# Patient Record
Sex: FEMALE | Race: OTHER | NOT HISPANIC OR LATINO | Employment: UNEMPLOYED | URBAN - METROPOLITAN AREA
[De-identification: names, ages, dates, MRNs, and addresses within clinical notes are randomized per-mention and may not be internally consistent; named-entity substitution may affect disease eponyms.]

---

## 2017-07-24 ENCOUNTER — TELEPHONE (OUTPATIENT)
Dept: FAMILY MEDICINE | Facility: CLINIC | Age: 42
End: 2017-07-24

## 2017-07-24 NOTE — TELEPHONE ENCOUNTER
----- Message from Earle Bhartijenifer sent at 7/21/2017  8:51 AM CDT -----  Contact: Patient  Patient states that she would like a follow up ER Appointment and to please call.  She was told by the ER that she would need a referral for a Neurologist.  Please call her back [phone].  Thank you

## 2017-08-03 ENCOUNTER — OFFICE VISIT (OUTPATIENT)
Dept: FAMILY MEDICINE | Facility: CLINIC | Age: 42
End: 2017-08-03
Payer: COMMERCIAL

## 2017-08-03 ENCOUNTER — LAB VISIT (OUTPATIENT)
Dept: LAB | Facility: HOSPITAL | Age: 42
End: 2017-08-03
Attending: NURSE PRACTITIONER
Payer: COMMERCIAL

## 2017-08-03 ENCOUNTER — TELEPHONE (OUTPATIENT)
Dept: FAMILY MEDICINE | Facility: CLINIC | Age: 42
End: 2017-08-03

## 2017-08-03 VITALS
WEIGHT: 128.31 LBS | SYSTOLIC BLOOD PRESSURE: 106 MMHG | RESPIRATION RATE: 12 BRPM | HEART RATE: 64 BPM | BODY MASS INDEX: 25.87 KG/M2 | DIASTOLIC BLOOD PRESSURE: 56 MMHG | TEMPERATURE: 99 F | HEIGHT: 59 IN

## 2017-08-03 DIAGNOSIS — M47.22 CERVICAL SPONDYLOSIS WITH RADICULOPATHY: Primary | ICD-10-CM

## 2017-08-03 DIAGNOSIS — R29.898 WEAKNESS OF LEFT UPPER EXTREMITY: ICD-10-CM

## 2017-08-03 DIAGNOSIS — F32.A DEPRESSION, UNSPECIFIED DEPRESSION TYPE: ICD-10-CM

## 2017-08-03 DIAGNOSIS — G47.00 INSOMNIA, UNSPECIFIED TYPE: ICD-10-CM

## 2017-08-03 DIAGNOSIS — R42 DIZZINESS: ICD-10-CM

## 2017-08-03 DIAGNOSIS — Z13.220 LIPID SCREENING: ICD-10-CM

## 2017-08-03 LAB
ALBUMIN SERPL BCP-MCNC: 3.9 G/DL
ALP SERPL-CCNC: 53 U/L
ALT SERPL W/O P-5'-P-CCNC: 9 U/L
ANION GAP SERPL CALC-SCNC: 9 MMOL/L
AST SERPL-CCNC: 14 U/L
BASOPHILS # BLD AUTO: 0.02 K/UL
BASOPHILS NFR BLD: 0.4 %
BILIRUB SERPL-MCNC: 0.4 MG/DL
BUN SERPL-MCNC: 11 MG/DL
CALCIUM SERPL-MCNC: 9.5 MG/DL
CHLORIDE SERPL-SCNC: 111 MMOL/L
CHOLEST/HDLC SERPL: 4.5 {RATIO}
CO2 SERPL-SCNC: 24 MMOL/L
CREAT SERPL-MCNC: 0.9 MG/DL
DIFFERENTIAL METHOD: NORMAL
EOSINOPHIL # BLD AUTO: 0.1 K/UL
EOSINOPHIL NFR BLD: 1.3 %
ERYTHROCYTE [DISTWIDTH] IN BLOOD BY AUTOMATED COUNT: 14.4 %
EST. GFR  (AFRICAN AMERICAN): >60 ML/MIN/1.73 M^2
EST. GFR  (NON AFRICAN AMERICAN): >60 ML/MIN/1.73 M^2
GLUCOSE SERPL-MCNC: 89 MG/DL
HCT VFR BLD AUTO: 37.2 %
HDL/CHOLESTEROL RATIO: 22.2 %
HDLC SERPL-MCNC: 194 MG/DL
HDLC SERPL-MCNC: 43 MG/DL
HGB BLD-MCNC: 12.8 G/DL
LDLC SERPL CALC-MCNC: 124.2 MG/DL
LYMPHOCYTES # BLD AUTO: 1.9 K/UL
LYMPHOCYTES NFR BLD: 41.9 %
MCH RBC QN AUTO: 29.1 PG
MCHC RBC AUTO-ENTMCNC: 34.4 G/DL
MCV RBC AUTO: 85 FL
MONOCYTES # BLD AUTO: 0.3 K/UL
MONOCYTES NFR BLD: 5.9 %
NEUTROPHILS # BLD AUTO: 2.3 K/UL
NEUTROPHILS NFR BLD: 50.5 %
NONHDLC SERPL-MCNC: 151 MG/DL
PLATELET # BLD AUTO: 247 K/UL
PMV BLD AUTO: 10.3 FL
POTASSIUM SERPL-SCNC: 4.4 MMOL/L
PROT SERPL-MCNC: 7.1 G/DL
PTH-INTACT SERPL-MCNC: 80 PG/ML
RBC # BLD AUTO: 4.4 M/UL
SODIUM SERPL-SCNC: 144 MMOL/L
TRIGL SERPL-MCNC: 134 MG/DL
TSH SERPL DL<=0.005 MIU/L-ACNC: 0.9 UIU/ML
WBC # BLD AUTO: 4.56 K/UL

## 2017-08-03 PROCEDURE — 83970 ASSAY OF PARATHORMONE: CPT

## 2017-08-03 PROCEDURE — 80053 COMPREHEN METABOLIC PANEL: CPT

## 2017-08-03 PROCEDURE — 85025 COMPLETE CBC W/AUTO DIFF WBC: CPT

## 2017-08-03 PROCEDURE — 84443 ASSAY THYROID STIM HORMONE: CPT

## 2017-08-03 PROCEDURE — 99204 OFFICE O/P NEW MOD 45 MIN: CPT | Mod: S$GLB,,, | Performed by: NURSE PRACTITIONER

## 2017-08-03 PROCEDURE — 80061 LIPID PANEL: CPT

## 2017-08-03 PROCEDURE — 36415 COLL VENOUS BLD VENIPUNCTURE: CPT | Mod: PO

## 2017-08-03 PROCEDURE — 3008F BODY MASS INDEX DOCD: CPT | Mod: S$GLB,,, | Performed by: NURSE PRACTITIONER

## 2017-08-03 RX ORDER — MIRTAZAPINE 15 MG/1
15 TABLET, ORALLY DISINTEGRATING ORAL NIGHTLY
Qty: 30 TABLET | Refills: 3 | Status: SHIPPED | OUTPATIENT
Start: 2017-08-03 | End: 2018-08-03

## 2017-08-03 RX ORDER — TRAMADOL HYDROCHLORIDE 50 MG/1
50 TABLET ORAL EVERY 6 HOURS PRN
Qty: 20 TABLET | Refills: 0 | Status: SHIPPED | OUTPATIENT
Start: 2017-08-03 | End: 2017-08-13

## 2017-08-03 RX ORDER — DULOXETIN HYDROCHLORIDE 30 MG/1
30 CAPSULE, DELAYED RELEASE ORAL DAILY
Qty: 30 CAPSULE | Refills: 2 | Status: SHIPPED | OUTPATIENT
Start: 2017-08-03 | End: 2023-11-27

## 2017-08-03 NOTE — PROGRESS NOTES
Subjective:       Patient ID: Zhang Kurtz is a 42 y.o. female.    Chief Complaint: Dizziness (aka DIZZY) over 2 weeks    HPI Has been dizzy for the past two weeks off and on. States had this in the past and was due to her neck. She went to the ER a few weeks ago and was told her Xray showed degenerative changes and that she needed MRI and to follow up with Neuro.  They gave IV fluids. Dizziness comes and goes. No sinus congestion or fluid in ears. Stiffness to neck. Weakness to left upper extremity. Hands have numbness, tingling. She was advised by neurosurgery in the past that she needed surgery but she was scared to do it. She did steroid injections and physical therapy which helped some. She is crying in the office today. States this has her depression acting up. She has been on several different anti-depressants in the past. States the cymbalta worked well and she wants to go back on that. She denies any other concerns. See ROS.    The following portion of the patients history was reviewed and updated as appropriate: allergies, current medications, past medical and surgical history. Past social history and problem list reviewed. Family PMH and Past social history reviewed. Tobacco, Illicit drug use reviewed.     Review of Systems   Constitutional: Positive for activity change (due to dizziness). Negative for chills, fatigue and fever.   HENT: Negative for congestion, postnasal drip, rhinorrhea, sinus pressure and sneezing.    Eyes: Negative for visual disturbance.   Respiratory: Negative for cough, chest tightness, shortness of breath and wheezing.    Cardiovascular: Negative for chest pain and palpitations.   Gastrointestinal: Negative for abdominal pain, constipation, diarrhea, nausea and vomiting.   Musculoskeletal: Positive for neck pain.   Neurological: Positive for dizziness, weakness (left upper extremity), numbness (numbness and tingling to left hand, arm) and headaches.   Psychiatric/Behavioral:  "Positive for decreased concentration, dysphoric mood and sleep disturbance. Negative for suicidal ideas.       Objective:     BP (!) 106/56 Comment: Manual, left arm, sitting  Pulse 64   Temp 98.8 °F (37.1 °C) (Oral)   Resp 12   Ht 4' 11" (1.499 m)   Wt 58.2 kg (128 lb 4.9 oz)   LMP 07/01/2017   BMI 25.91 kg/m²      Physical Exam     Constitutional: oriented to person, place, and time. well-developed and well-nourished.   HENT:  Throat clear. Canals blocked with cerumen. Nares patent.  Head: Normocephalic.   Eyes: Conjunctivae are normal. Pupils are equal, round, and reactive to light.   Neck: Normal range of motion. Neck supple. No tracheal deviation present. No thyromegaly present. discomfort with flexion and extension of the neck.   Cardiovascular: Normal rate, regular rhythm and normal heart sounds.    Pulmonary/Chest: Effort normal and breath sounds normal. No respiratory distress. No wheezes.   Abdominal: Soft. Bowel sounds are normal. No distension. There is no tenderness.   Musculoskeletal: Normal range of motion. gait and coordination normal. Weakness to  left hand.   Neurological: oriented to person, place, and time.   Skin: Skin is warm and dry. No rashes or lesions  Psychiatric: depressed mood. Crying in office. She does not present as a threat to herself or others.  Assessment:       1. Cervical spondylosis with radiculopathy    2. Weakness of left upper extremity    3. Dizziness    4. Depression, unspecified depression type    5. Insomnia, unspecified type    6. Lipid screening        Plan:         Zhang was seen today for dizziness (aka dizzy) over 2 weeks.    Diagnoses and all orders for this visit:    Cervical spondylosis with radiculopathy: will get records from [facility] ER visit. She has seen neurosurgery In the past. Will refer to them for follow up. She will need updated MRI.  -     Ambulatory referral to Neurosurgery  -     MRI Cervical Spine W WO Cont; Future    Weakness of left upper " extremity: due to cervical disks. Will need MRI.  -     MRI Cervical Spine W WO Cont; Future    Dizziness: no indication this is from Upper respiratory symptoms. Will check MRI. Labs, Give meclizine for symptoms.  -     MRI Cervical Spine W WO Cont; Future  -     CBC auto differential; Future  -     Comprehensive metabolic panel; Future  -     TSH; Future  -     PTH, intact; Future    Depression, unspecified depression type: restart on Cymbalta. Take as directed.    Insomnia, unspecified type: she states Dr. Forman had her on Remeron in the past that worked very well for her. She would like to try this medication again.     Lipid screening: due for labs.  -     Lipid panel; Future    Other orders  -     duloxetine (CYMBALTA) 30 MG capsule; Take 1 capsule (30 mg total) by mouth once daily.  -     mirtazapine (REMERON SOL-TAB) 15 MG disintegrating tablet; Take 1 tablet (15 mg total) by mouth every evening.  -     tramadol (ULTRAM) 50 mg tablet; Take 1 tablet (50 mg total) by mouth every 6 (six) hours as needed for Pain.      Take medications only as prescribed  Healthy diet, exercise  Adequate rest  Adequate hydration  Avoid allergens  Avoid excessive caffeine

## 2017-08-03 NOTE — TELEPHONE ENCOUNTER
----- Message from Nikky Cardona sent at 8/3/2017 11:25 AM CDT -----  Contact: [phone]  Patient is requesting a call back from the nurse stated she allergic to codeine and medication called in have codeine in it.     Please call the patient upon request at phone number [phone].

## 2017-08-03 NOTE — TELEPHONE ENCOUNTER
Pt states that the tramadol made her ill. She is requesting something else be sent to pharmacy. Pt advised she would be contacted if there were any issues. Pt also advised that tramadol does not have codeine in it. Pt verbalized understanding.

## 2017-08-03 NOTE — TELEPHONE ENCOUNTER
That is the strongest medication I can give her as an NP. If she feels she needs stronger narcotics to control her pain she will have to see MD or discuss with the Neurosurgeon when she see's them.

## 2017-08-08 ENCOUNTER — TELEPHONE (OUTPATIENT)
Dept: FAMILY MEDICINE | Facility: CLINIC | Age: 42
End: 2017-08-08

## 2017-08-08 ENCOUNTER — PATIENT MESSAGE (OUTPATIENT)
Dept: FAMILY MEDICINE | Facility: CLINIC | Age: 42
End: 2017-08-08

## 2017-08-08 DIAGNOSIS — R79.89 ELEVATED PTHRP LEVEL: Primary | ICD-10-CM

## 2017-08-08 RX ORDER — ONDANSETRON 8 MG/1
8 TABLET, ORALLY DISINTEGRATING ORAL EVERY 6 HOURS PRN
Qty: 20 TABLET | Refills: 1 | Status: SHIPPED | OUTPATIENT
Start: 2017-08-08 | End: 2023-11-27

## 2017-08-08 NOTE — TELEPHONE ENCOUNTER
Received ER hosp records from [facility], DOS 07/20/2017.    hosp f/u was 8/3/17, RAVEN Monzon.      Put into [place]ff,NP in-box.

## 2017-08-08 NOTE — TELEPHONE ENCOUNTER
Her parathyroid hormone is slightly elevated. She needs to have her Vitamin D level checked. Her calcium is normal.  Remainder of labs are all in appropriate range. I put in orders for Vitamin D to be done. Thanks.

## 2017-08-08 NOTE — TELEPHONE ENCOUNTER
Pt is inquiring about her [facility] paperwork. Pt is now requesting a medication for nausea. Is there is anything you can send in for this? Also pt is asking for you to extend her work excuse for one more day as she is still feeling bad. Please advise and I will contact pt with orders. NATI Aguilar LPN

## 2017-08-08 NOTE — TELEPHONE ENCOUNTER
----- Message from Padmaja Archer sent at 8/7/2017  2:16 PM CDT -----  Contact: pt   Wants to know if paperwork has been received?  Call back

## 2017-08-08 NOTE — TELEPHONE ENCOUNTER
Nausea medication sent to pharmacy. Paperwork completed, can fax.  She was supposed to have appointment scheduled with neurosurgery. I do not see that it was done. Please schedule that.

## 2017-08-08 NOTE — TELEPHONE ENCOUNTER
----- Message from Padmaja Archer sent at 8/7/2017  2:17 PM CDT -----  Contact: pt   Wants something called in for pain and nausea  Call back   .  [facility] [address] [place] [address] & [address] [address]  Phone: [phone] Fax: [phone]

## 2017-08-09 ENCOUNTER — OFFICE VISIT (OUTPATIENT)
Dept: OBSTETRICS AND GYNECOLOGY | Facility: CLINIC | Age: 42
End: 2017-08-09
Payer: COMMERCIAL

## 2017-08-09 ENCOUNTER — HOSPITAL ENCOUNTER (OUTPATIENT)
Dept: RADIOLOGY | Facility: HOSPITAL | Age: 42
Discharge: HOME | End: 2017-08-09
Attending: OBSTETRICS & GYNECOLOGY
Payer: COMMERCIAL

## 2017-08-09 VITALS
BODY MASS INDEX: 25.4 KG/M2 | WEIGHT: 126 LBS | HEIGHT: 59 IN | WEIGHT: 126.75 LBS | BODY MASS INDEX: 25.55 KG/M2 | DIASTOLIC BLOOD PRESSURE: 70 MMHG | SYSTOLIC BLOOD PRESSURE: 102 MMHG | HEIGHT: 59 IN

## 2017-08-09 DIAGNOSIS — Z01.419 ENCOUNTER FOR GYNECOLOGICAL EXAMINATION WITHOUT ABNORMAL FINDING: Primary | ICD-10-CM

## 2017-08-09 DIAGNOSIS — Z12.31 VISIT FOR SCREENING MAMMOGRAM: ICD-10-CM

## 2017-08-09 DIAGNOSIS — R10.2 PELVIC PAIN IN FEMALE: ICD-10-CM

## 2017-08-09 PROCEDURE — 77067 SCR MAMMO BI INCL CAD: CPT | Mod: 26,,, | Performed by: RADIOLOGY

## 2017-08-09 PROCEDURE — 88175 CYTOPATH C/V AUTO FLUID REDO: CPT

## 2017-08-09 PROCEDURE — 99386 PREV VISIT NEW AGE 40-64: CPT | Mod: S$GLB,,, | Performed by: OBSTETRICS & GYNECOLOGY

## 2017-08-09 PROCEDURE — 77067 SCR MAMMO BI INCL CAD: CPT | Mod: TC

## 2017-08-09 PROCEDURE — 99999 PR PBB SHADOW E&M-EST. PATIENT-LVL III: CPT | Mod: PBBFAC,,, | Performed by: OBSTETRICS & GYNECOLOGY

## 2017-08-09 PROCEDURE — 87624 HPV HI-RISK TYP POOLED RSLT: CPT

## 2017-08-09 NOTE — PROGRESS NOTES
Chief Complaint   Patient presents with    Well Woman       History and Physical:  Patient's last menstrual period was 08/01/2017 (exact date).       Zhang Kurtz is a 42 y.o. -American Female who presents today for her routine annual GYN exam. The patient has no Gynecology complaints today. No bowel or bladder complaints. Reports pelvic pain on and off over last 2-3 years, no oral contraceptive pills or Birth control - counseled.       Allergies:   Review of patient's allergies indicates:   Allergen Reactions    Codeine Nausea Only       Past Medical History:   Diagnosis Date    Abnormal Pap smear     High grade squamous intraepithelial lesion.    Anxiety     Breast disorder     lumps removed in the past,  bilateral    Depression     Dysplasia of cervix     s/p CKC    Fatty liver     noted on 1/12 USG    Generalized headaches     Hyperlipidemia     Increased prolactin level     Pituitary microadenoma with hyperprolactinemia        Past Surgical History:   Procedure Laterality Date    CERVICAL CONIZATION   W/ LASER         MEDS:   Current Outpatient Prescriptions on File Prior to Visit   Medication Sig Dispense Refill    duloxetine (CYMBALTA) 30 MG capsule Take 1 capsule (30 mg total) by mouth once daily. 30 capsule 2    mirtazapine (REMERON SOL-TAB) 15 MG disintegrating tablet Take 1 tablet (15 mg total) by mouth every evening. 30 tablet 3    ondansetron (ZOFRAN-ODT) 8 MG TbDL Take 1 tablet (8 mg total) by mouth every 6 (six) hours as needed. 20 tablet 1    alprazolam (XANAX) 2 MG Tab Take 1 tablet (2 mg total) by mouth 3 (three) times daily as needed. 90 tablet 2    buPROPion (WELLBUTRIN SR) 150 MG TBSR 12 hr tablet Take 1 tablet (150 mg total) by mouth 2 (two) times daily. 60 tablet 3    tramadol (ULTRAM) 50 mg tablet Take 1 tablet (50 mg total) by mouth every 6 (six) hours as needed for Pain. 20 tablet 0    zolpidem (AMBIEN) 10 mg Tab Take 1 tablet (10 mg total) by mouth nightly as  needed. 30 tablet 2    [DISCONTINUED] baclofen (LIORESAL) 20 MG tablet       [DISCONTINUED] gabapentin (NEURONTIN) 800 MG tablet        No current facility-administered medications on file prior to visit.        OB History      Para Term  AB Living    3 2     1      SAB TAB Ectopic Multiple Live Births    1                  Social History     Social History    Marital status: Single     Spouse name: N/A    Number of children: 2    Years of education: N/A     Occupational History    administrative coordinatory      Social History Main Topics    Smoking status: Former Smoker     Packs/day: 1.00     Years: 10.00     Types: Cigars    Smokeless tobacco: Never Used      Comment: cigars    Alcohol use 0.6 oz/week     1 Standard drinks or equivalent per week    Drug use: No    Sexual activity: Yes     Partners: Male     Birth control/ protection: None     Other Topics Concern    Not on file     Social History Narrative    No narrative on file       Family History   Problem Relation Age of Onset    Cancer Mother      lymph node CA    Asthma Daughter     Anesthesia problems Neg Hx     Clotting disorder Neg Hx          Past medical and surgical history reviewed.   I have reviewed the patient's medical history in detail and updated the computerized patient record.        Review of System:   General: no chills, fever, night sweats, weight gain or weight loss  Psychological: no depression or suicidal ideation  Breasts: no new or changing breast lumps, nipple discharge or masses.  Respiratory: no cough, shortness of breath, or wheezing  Cardiovascular: no chest pain or dyspnea on exertion  Gastrointestinal: no abdominal pain, change in bowel habits, or black or bloody stools  Genito-Urinary: no incontinence, urinary frequency/urgency or vulvar/vaginal symptoms, pelvic pain or abnormal vaginal bleeding.  Musculoskeletal: no gait disturbance or muscular weakness      Physical Exam:   /70   Ht 4'  "10.5" (1.486 m)   Wt 57.5 kg (126 lb 12.2 oz)   LMP 08/01/2017 (Exact Date)   BMI 26.04 kg/m²   Constitutional: She is oriented to person, place, and time. She appears well-developed and well-nourished. No distress.   HENT:   Head: Normocephalic and atraumatic.   Eyes: Conjunctivae and EOM are normal. No scleral icterus.   Neck: Normal range of motion. Neck supple. No tracheal deviation present.   Cardiovascular: Normal rate.    Pulmonary/Chest: Effort normal. No respiratory distress. She exhibits no tenderness.  Breasts: are symmetrical.   Right breast exhibits no inverted nipple, no mass, no nipple discharge, no skin change and no tenderness.   Left breast exhibits no inverted nipple, no mass, no nipple discharge, no skin change and no tenderness.  Abdominal: Soft. She exhibits no distension and no mass. There is no tenderness. There is no rebound and no guarding.   Genitourinary:    External rectal exam shows no thrombosed external hemorrhoids.    Pelvic exam was performed with patient supine.   No labial fusion.   There is no rash, lesion or injury on the right labia.   There is no rash, lesion or injury on the left labia.   No bleeding and no signs of injury around the vaginal introitus, urethra is without lesions and well supported. The cervix is visualized with no discharge, lesions or friability.   No vaginal discharge found.    No significant Cystocele, Enterocele or rectocele, and uterus well supported.   Bimanual exam:   The urethra is normal to palpation and there are no palpable vaginal wall masses.   Uterus is not deviated, not enlarged, not fixed, normal shape and not tender.   Cervix exhibits no motion tenderness.    Right adnexum displays no mass and no tenderness.   Left adnexum displays no mass and no tenderness.  Musculoskeletal: Normal range of motion.   Lymphadenopathy: No inguinal adenopathy present.   Neurological: She is alert and oriented to person, place, and time. Coordination normal. "   Skin: Skin is warm and dry. She is not diaphoretic.   Psychiatric: She has a normal mood and affect.      Assessment:   Normal annual GYN exam  1. Encounter for gynecological examination without abnormal finding  Liquid-based pap smear, screening    HPV High Risk Genotypes, PCR   2. Visit for screening mammogram  Mammo Digital Screening Bilat With CAD       Plan:   PAP  Mammogram  Follow up in 1 year.

## 2017-08-10 ENCOUNTER — TELEPHONE (OUTPATIENT)
Dept: FAMILY MEDICINE | Facility: CLINIC | Age: 42
End: 2017-08-10

## 2017-08-10 LAB
C TRACH DNA SPEC QL NAA+PROBE: NOT DETECTED
N GONORRHOEA DNA SPEC QL NAA+PROBE: NOT DETECTED

## 2017-08-10 RX ORDER — ERGOCALCIFEROL 1.25 MG/1
50000 CAPSULE ORAL
Qty: 12 CAPSULE | Refills: 3 | Status: SHIPPED | OUTPATIENT
Start: 2017-08-10 | End: 2023-11-27 | Stop reason: SDUPTHER

## 2017-08-10 NOTE — TELEPHONE ENCOUNTER
Attempted to call pt, left message on voicemail to return call to clinic.  There are multiple messages regarding this patient.    Please ensure she is also notified that her vitamin D is low and Sheron recommends a supplement that was sent to the pharmacy. Remainder of labs are pending.

## 2017-08-10 NOTE — TELEPHONE ENCOUNTER
----- Message from Yaritza Tinsley sent at 8/10/2017 11:57 AM CDT -----  Contact: patient  Patient returning a missed call. Please advise. Call to pod. No answer.  Call back , she is at work in a call center/ leave message  Thanks!

## 2017-08-10 NOTE — TELEPHONE ENCOUNTER
----- Message from Edin Britt sent at 8/9/2017  1:40 PM CDT -----  Contact: self   [phone]  Patient states she is returning the nurse phone call. Thanks!

## 2017-08-10 NOTE — TELEPHONE ENCOUNTER
Her vitamin D is low. She needs to be on supplements for this. I will send to pharmacy. Take one weekly. Remainder of labs are pending.

## 2017-08-11 NOTE — TELEPHONE ENCOUNTER
Please let her know that I got this message concerning her MRI that we ordered. Her insurance is not going to pay for her to have it done at this time.    Dr. Sheron Monzon,     Thank you for ordering   [id number] - MRI CERVICAL SPINE W WO CONTRAST for patient Zhang Kurtz, MRN [id number]. Unfortunately, the patient's insurance, [employer], has denied the service due to Other, N/A. This is an automated In Basket notification that does not require a reply. For a more detailed explanation, or for questions regarding this insurance denial, send an In Basket message to the Pre Service Intake pool or call the [facility] Pre-Service department at [phone] and reference referral ID [id number].The Pre-Service department hours are M-F 8 a.m. to 5 p.m.       Thank you,

## 2017-08-11 NOTE — TELEPHONE ENCOUNTER
Attempted to call pt, left message on voicemail to return call to clinic.    Need to also notify that insurance denied MRI at this time.

## 2017-08-13 ENCOUNTER — PATIENT MESSAGE (OUTPATIENT)
Dept: FAMILY MEDICINE | Facility: CLINIC | Age: 42
End: 2017-08-13

## 2017-08-15 ENCOUNTER — TELEPHONE (OUTPATIENT)
Dept: FAMILY MEDICINE | Facility: CLINIC | Age: 42
End: 2017-08-15

## 2017-08-15 LAB
HPV HR 12 DNA CVX QL NAA+PROBE: NEGATIVE
HPV16 DNA SPEC QL NAA+PROBE: NEGATIVE
HPV18 DNA SPEC QL NAA+PROBE: NEGATIVE

## 2017-08-15 NOTE — TELEPHONE ENCOUNTER
----- Message from Sheron Monzon NP sent at 8/14/2017  1:11 PM CDT -----  Her MRI was denied. Please cancel that order so she does not go and have it done and then not be covered by insurance. Please try again to let her know about this.

## 2017-08-18 ENCOUNTER — TELEPHONE (OUTPATIENT)
Dept: FAMILY MEDICINE | Facility: CLINIC | Age: 42
End: 2017-08-18

## 2017-08-18 NOTE — TELEPHONE ENCOUNTER
"Request for MRI/CERVICAL has been Denied   00161 1 MRI Cervical Spine, (spinal canal and contents); without and with contrast Denied Based on [facility], we are unable to approve the requested procedure. Spinal imaging is not generally necessary during the first six weeks of symptoms except when a "red flag" finding is noted. MRI might be supported in the evaluation of suspected or known spinal disease with one of the followin) failure to improve after a recent (within 3 months) 6 week trial of physician-guided clinical care (treatment or observation) with clinical re-evaluation, or 2) any signs or symptoms such as significant motor weakness, recent malignancy or infection, cauda equina syndrome, for which conservative treatment is not needed. The clinical information received fails to support meeting these requirements and, therefore, the requested procedure is not indicated at this time.   Sheron Monzon can engage in a physician peer to peer review by calling   [place] @ [phone] opt 4 and give case #[id number]   Please call Jessica @ ext [phone] or IM me once the peer to peer has been completed.   Thanks       "

## 2017-08-29 ENCOUNTER — TELEPHONE (OUTPATIENT)
Dept: FAMILY MEDICINE | Facility: CLINIC | Age: 42
End: 2017-08-29

## 2018-08-10 ENCOUNTER — DOCUMENTATION ONLY (OUTPATIENT)
Dept: OBSTETRICS AND GYNECOLOGY | Facility: CLINIC | Age: 43
End: 2018-08-10

## 2018-08-13 ENCOUNTER — PATIENT MESSAGE (OUTPATIENT)
Dept: OBSTETRICS AND GYNECOLOGY | Facility: CLINIC | Age: 43
End: 2018-08-13

## 2018-11-14 ENCOUNTER — OFFICE VISIT (OUTPATIENT)
Dept: FAMILY MEDICINE | Facility: CLINIC | Age: 43
End: 2018-11-14
Payer: COMMERCIAL

## 2018-11-14 VITALS
DIASTOLIC BLOOD PRESSURE: 70 MMHG | WEIGHT: 124.56 LBS | HEIGHT: 59 IN | RESPIRATION RATE: 18 BRPM | SYSTOLIC BLOOD PRESSURE: 106 MMHG | BODY MASS INDEX: 25.11 KG/M2 | HEART RATE: 76 BPM | TEMPERATURE: 98 F

## 2018-11-14 DIAGNOSIS — G47.00 INSOMNIA, UNSPECIFIED TYPE: ICD-10-CM

## 2018-11-14 DIAGNOSIS — Z12.39 BREAST CANCER SCREENING: ICD-10-CM
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
## 2018-11-14 DIAGNOSIS — M50.30 DDD (DEGENERATIVE DISC DISEASE), CERVICAL: Primary | ICD-10-CM

## 2018-11-14 DIAGNOSIS — M25.569 KNEE PAIN, UNSPECIFIED CHRONICITY, UNSPECIFIED LATERALITY: ICD-10-CM

## 2018-11-14 DIAGNOSIS — F41.1 GENERALIZED ANXIETY DISORDER: ICD-10-CM

## 2018-11-14 PROCEDURE — 96372 THER/PROPH/DIAG INJ SC/IM: CPT | Mod: 59,S$GLB,, | Performed by: NURSE PRACTITIONER

## 2018-11-14 PROCEDURE — 90686 IIV4 VACC NO PRSV 0.5 ML IM: CPT | Mod: S$GLB,,, | Performed by: NURSE PRACTITIONER

## 2018-11-14 PROCEDURE — 99214 OFFICE O/P EST MOD 30 MIN: CPT | Mod: 25,S$GLB,, | Performed by: NURSE PRACTITIONER

## 2018-11-14 PROCEDURE — 90471 IMMUNIZATION ADMIN: CPT | Mod: S$GLB,,, | Performed by: NURSE PRACTITIONER

## 2018-11-14 PROCEDURE — 3008F BODY MASS INDEX DOCD: CPT | Mod: CPTII,S$GLB,, | Performed by: NURSE PRACTITIONER

## 2018-11-14 RX ORDER — ZOLPIDEM TARTRATE 5 MG/1
5 TABLET ORAL NIGHTLY PRN
Qty: 30 TABLET | Refills: 0 | Status: SHIPPED | OUTPATIENT
Start: 2018-11-14 | End: 2023-11-27

## 2018-11-14 RX ORDER — BACLOFEN 20 MG/1
20 TABLET ORAL 3 TIMES DAILY
Qty: 90 TABLET | Refills: 3 | Status: SHIPPED | OUTPATIENT
Start: 2018-11-14 | End: 2023-11-27

## 2018-11-14 RX ORDER — KETOROLAC TROMETHAMINE 30 MG/ML
60 INJECTION, SOLUTION INTRAMUSCULAR; INTRAVENOUS
Status: COMPLETED | OUTPATIENT
Start: 2018-11-14 | End: 2018-11-14

## 2018-11-14 RX ORDER — MELOXICAM 15 MG/1
15 TABLET ORAL DAILY
Qty: 30 TABLET | Refills: 0 | Status: SHIPPED | OUTPATIENT
Start: 2018-11-14 | End: 2023-11-27

## 2018-11-14 RX ADMIN — KETOROLAC TROMETHAMINE 60 MG: 30 INJECTION, SOLUTION INTRAMUSCULAR; INTRAVENOUS at 03:11
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
## 2018-11-14 NOTE — PATIENT INSTRUCTIONS
[facility]  [address]  [place] [address]  Phone: [phone]  Fax: [phone]    [facility]  [address] [place] [address]  Tel: [phone]   Fax: [phone]    [facility]  [address] [place] [address]  Telephone: [phone]    [address] [address]  Phone: [phone]  Fax: [phone]    [facility]  [address], [place] [address]  [phone]    [facility]  [address] [place] [address]              or  [address] [place] [address]  [phone]    [facility]  [phone]    [facility]  [phone]  or  [email or web address]     [facility]  [address], [place] [address]  Phone: [phone]

## 2018-11-14 NOTE — LETTER
November 14, 2018      [facility]  [address] [address] [address]  [facility] [address]  Phone: [phone]  Fax: [phone]       Patient: Zhang Kurtz   YOB: 1975  Date of Visit: 11/14/2018    To Whom It May Concern:    Cici Kurtz  was at [facility] on 11/14/2018. She was out from 11/13/2018 and may return to work/school on 11/20/2018with no restrictions. If you have any questions or concerns, or if I can be of further assistance, please do not hesitate to contact me.    Sincerely,    Adilene MARIE

## 2018-11-15 ENCOUNTER — HOSPITAL ENCOUNTER (OUTPATIENT)
Dept: RADIOLOGY | Facility: CLINIC | Age: 43
Discharge: HOME | End: 2018-11-15
Attending: NURSE PRACTITIONER
Payer: COMMERCIAL

## 2018-11-15 DIAGNOSIS — Z12.39 BREAST CANCER SCREENING: ICD-10-CM
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
## 2018-11-15 PROCEDURE — 77063 BREAST TOMOSYNTHESIS BI: CPT | Mod: 26,,, | Performed by: RADIOLOGY

## 2018-11-15 PROCEDURE — 77067 SCR MAMMO BI INCL CAD: CPT | Mod: 26,,, | Performed by: RADIOLOGY

## 2018-11-15 PROCEDURE — 77063 BREAST TOMOSYNTHESIS BI: CPT | Mod: TC,PO

## 2018-11-15 PROCEDURE — 77067 SCR MAMMO BI INCL CAD: CPT | Mod: TC,PO

## 2018-11-28 ENCOUNTER — TELEPHONE (OUTPATIENT)
Dept: FAMILY MEDICINE | Facility: CLINIC | Age: 43
End: 2018-11-28

## 2018-11-28 NOTE — TELEPHONE ENCOUNTER
Spoke with pt. She has an appointment with pain management today. It was hard to understand her with the background noise of other people.
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
## 2018-12-14 ENCOUNTER — PATIENT MESSAGE (OUTPATIENT)
Dept: FAMILY MEDICINE | Facility: CLINIC | Age: 43
End: 2018-12-14

## 2019-02-03 RX ORDER — ZOLPIDEM TARTRATE 5 MG/1
TABLET ORAL
Qty: 30 TABLET | Refills: 0 | OUTPATIENT
Start: 2019-02-03

## 2020-06-12 NOTE — LETTER
August 3, 2017      [id number] [address] [address]  [facility] [address]  Phone: [phone]  Fax: [phone]       Patient: Zhang Kurtz   YOB: 1975  Date of Visit: 08/03/2017    To Whom It May Concern:    Zhang Fernandez was at [facility] on 08/03/2017. She may return to work on 08/09/2017 with no restrictions. If you have any questions or concerns, or if I can be of further assistance, please do not hesitate to contact me.    Sincerely,        Sheron MonzonNP      Pt returned the phone call: Pls call again at    CELL: [phone]

## 2021-07-08 ENCOUNTER — HOSPITAL ENCOUNTER (EMERGENCY)
Facility: HOSPITAL | Age: 46
Discharge: HOME | End: 2021-07-08
Attending: EMERGENCY MEDICINE
Payer: MEDICAID

## 2021-07-08 VITALS
TEMPERATURE: 99 F | SYSTOLIC BLOOD PRESSURE: 130 MMHG | RESPIRATION RATE: 16 BRPM | WEIGHT: 118 LBS | HEART RATE: 75 BPM | OXYGEN SATURATION: 100 % | BODY MASS INDEX: 23.79 KG/M2 | HEIGHT: 59 IN | DIASTOLIC BLOOD PRESSURE: 82 MMHG

## 2021-07-08 DIAGNOSIS — H02.843 SWELLING OF RIGHT EYELID: ICD-10-CM

## 2021-07-08 DIAGNOSIS — R05.9 COUGH: ICD-10-CM

## 2021-07-08 DIAGNOSIS — J06.9 VIRAL URI WITH COUGH: Primary | ICD-10-CM

## 2021-07-08 LAB
B-HCG UR QL: NEGATIVE
CTP QC/QA: YES
SARS-COV-2 RDRP RESP QL NAA+PROBE: NEGATIVE

## 2021-07-08 PROCEDURE — U0002 COVID-19 LAB TEST NON-CDC: HCPCS | Performed by: EMERGENCY MEDICINE

## 2021-07-08 PROCEDURE — 81025 URINE PREGNANCY TEST: CPT | Performed by: EMERGENCY MEDICINE

## 2021-07-08 PROCEDURE — 99283 EMERGENCY DEPT VISIT LOW MDM: CPT | Mod: 25

## 2021-07-08 RX ORDER — ALBUTEROL SULFATE 90 UG/1
1-2 AEROSOL, METERED RESPIRATORY (INHALATION) EVERY 6 HOURS PRN
Qty: 1 G | Refills: 0 | Status: SHIPPED | OUTPATIENT
Start: 2021-07-08 | End: 2023-11-27

## 2021-07-08 RX ORDER — PREDNISONE 20 MG/1
40 TABLET ORAL DAILY
Qty: 10 TABLET | Refills: 0 | Status: SHIPPED | OUTPATIENT
Start: 2021-07-08 | End: 2021-07-13

## 2022-02-15 ENCOUNTER — OFFICE VISIT (OUTPATIENT)
Dept: UROGYNECOLOGY | Facility: CLINIC | Age: 47
End: 2022-02-15
Payer: MEDICAID

## 2022-02-15 VITALS
HEART RATE: 78 BPM | SYSTOLIC BLOOD PRESSURE: 120 MMHG | HEIGHT: 58 IN | DIASTOLIC BLOOD PRESSURE: 75 MMHG | BODY MASS INDEX: 24.76 KG/M2 | WEIGHT: 117.94 LBS

## 2022-02-15 DIAGNOSIS — N39.41 URGE INCONTINENCE OF URINE: ICD-10-CM

## 2022-02-15 DIAGNOSIS — R35.0 URINARY FREQUENCY: Primary | ICD-10-CM

## 2022-02-15 DIAGNOSIS — R39.14 FEELING OF INCOMPLETE BLADDER EMPTYING: ICD-10-CM

## 2022-02-15 LAB
BILIRUB SERPL-MCNC: NORMAL MG/DL
BLOOD URINE, POC: NORMAL
CLARITY, POC UA: CLEAR
COLOR, POC UA: YELLOW
GLUCOSE UR QL STRIP: NORMAL
KETONES UR QL STRIP: NORMAL
LEUKOCYTE ESTERASE URINE, POC: NORMAL
NITRITE, POC UA: NORMAL
PH, POC UA: 5
POC RESIDUAL URINE VOLUME: 55 ML (ref 0–100)
PROTEIN, POC: NORMAL
SPECIFIC GRAVITY, POC UA: 1.02
UROBILINOGEN, POC UA: NORMAL

## 2022-02-15 PROCEDURE — 3078F PR MOST RECENT DIASTOLIC BLOOD PRESSURE < 80 MM HG: ICD-10-PCS | Mod: CPTII,,, | Performed by: NURSE PRACTITIONER

## 2022-02-15 PROCEDURE — 1159F MED LIST DOCD IN RCRD: CPT | Mod: CPTII,,, | Performed by: NURSE PRACTITIONER

## 2022-02-15 PROCEDURE — 3008F PR BODY MASS INDEX (BMI) DOCUMENTED: ICD-10-PCS | Mod: CPTII,,, | Performed by: NURSE PRACTITIONER

## 2022-02-15 PROCEDURE — 3074F PR MOST RECENT SYSTOLIC BLOOD PRESSURE < 130 MM HG: ICD-10-PCS | Mod: CPTII,,, | Performed by: NURSE PRACTITIONER

## 2022-02-15 PROCEDURE — 99999 PR PBB SHADOW E&M-EST. PATIENT-LVL III: CPT | Mod: PBBFAC,,, | Performed by: NURSE PRACTITIONER

## 2022-02-15 PROCEDURE — 99214 PR OFFICE/OUTPT VISIT, EST, LEVL IV, 30-39 MIN: ICD-10-PCS | Mod: S$PBB,,, | Performed by: NURSE PRACTITIONER

## 2022-02-15 PROCEDURE — 51798 US URINE CAPACITY MEASURE: CPT | Mod: PBBFAC,PO | Performed by: NURSE PRACTITIONER

## 2022-02-15 PROCEDURE — 1160F RVW MEDS BY RX/DR IN RCRD: CPT | Mod: CPTII,,, | Performed by: NURSE PRACTITIONER

## 2022-02-15 PROCEDURE — 99214 OFFICE O/P EST MOD 30 MIN: CPT | Mod: S$PBB,,, | Performed by: NURSE PRACTITIONER

## 2022-02-15 PROCEDURE — 99213 OFFICE O/P EST LOW 20 MIN: CPT | Mod: PBBFAC,PO | Performed by: NURSE PRACTITIONER

## 2022-02-15 PROCEDURE — 81002 URINALYSIS NONAUTO W/O SCOPE: CPT | Mod: PBBFAC,PO | Performed by: NURSE PRACTITIONER

## 2022-02-15 PROCEDURE — 3008F BODY MASS INDEX DOCD: CPT | Mod: CPTII,,, | Performed by: NURSE PRACTITIONER

## 2022-02-15 PROCEDURE — 1160F PR REVIEW ALL MEDS BY PRESCRIBER/CLIN PHARMACIST DOCUMENTED: ICD-10-PCS | Mod: CPTII,,, | Performed by: NURSE PRACTITIONER

## 2022-02-15 PROCEDURE — 3074F SYST BP LT 130 MM HG: CPT | Mod: CPTII,,, | Performed by: NURSE PRACTITIONER

## 2022-02-15 PROCEDURE — 99999 PR PBB SHADOW E&M-EST. PATIENT-LVL III: ICD-10-PCS | Mod: PBBFAC,,, | Performed by: NURSE PRACTITIONER

## 2022-02-15 PROCEDURE — 3078F DIAST BP <80 MM HG: CPT | Mod: CPTII,,, | Performed by: NURSE PRACTITIONER

## 2022-02-15 PROCEDURE — 1159F PR MEDICATION LIST DOCUMENTED IN MEDICAL RECORD: ICD-10-PCS | Mod: CPTII,,, | Performed by: NURSE PRACTITIONER

## 2022-02-15 RX ORDER — CLONAZEPAM 0.5 MG/1
1 TABLET ORAL DAILY
COMMUNITY
Start: 2021-12-13 | End: 2023-11-27

## 2022-02-15 NOTE — PROGRESS NOTES
Subjective:       Patient ID: Zhang Kurtz is a 46 y.o. female.    Chief Complaint: urinary frequency/ incontinence      Zhang Kurtz is a 46 y.o. female who is new to me, presents today for consult in regards to urinary frequency and urgency.  She has been having problems with incontinence for over 2 year now and it is to the point where she is very frustrated and hoping something can be done.  She has seen urology in the past, but did not feel that she had any improvement in her symptoms.  She is unsure of her daytime frequency, but states that it is a lot.  She mostly denies any nocturia.  She has + UUI and urgency, but will on occasion have some STELLA. She has some feeling of PVF at times.   She denies any history of recurrent UTI or kidney stones.  She drinks about 3 cokes a day and 1-2 glasses of juice a day.  She does verbalize that she needs to get more water.  She has tried ditropan in the past, but did not feel that it did anything for her.  She  She denies any vaginal discharge/bleeding or bulging sensation.  She had a recent [employer] about 3 months ago with Dr. Esme Mcugire.  She is sexually active and has some mild dyspareunia at times.  She is wanting to know what can be done.  She denies any other acute complaints/concerns at this time.    Review of Systems   Constitutional: Negative for activity change, fever and unexpected weight change.   HENT: Negative for hearing loss.    Eyes: Negative for visual disturbance.   Respiratory: Negative for shortness of breath and wheezing.    Cardiovascular: Negative for chest pain, palpitations and leg swelling.   Gastrointestinal: Negative for abdominal pain, constipation and diarrhea.   Genitourinary: Positive for dyspareunia, frequency and urgency. Negative for dysuria, vaginal bleeding and vaginal discharge.   Musculoskeletal: Negative for gait problem and neck pain.   Skin: Negative for rash and wound.   Allergic/Immunologic: Negative for immunocompromised state.    Neurological: Negative for tremors, speech difficulty and weakness.   Hematological: Does not bruise/bleed easily.   Psychiatric/Behavioral: Negative for agitation and confusion.       Objective:      Physical Exam  Constitutional:       General: She is not in acute distress.     Appearance: She is well-developed and well-nourished.   HENT:      Head: Normocephalic and atraumatic.   Neck:      Thyroid: No thyromegaly.   Pulmonary:      Effort: Pulmonary effort is normal. No respiratory distress.   Abdominal:      Palpations: Abdomen is soft.      Tenderness: There is no abdominal tenderness.      Hernia: No hernia is present.   Musculoskeletal:         General: Normal range of motion.      Cervical back: Neck supple.   Skin:     General: Skin is warm and dry.      Findings: No rash.   Neurological:      Mental Status: She is alert and oriented to person, place, and time.   Psychiatric:         Mood and Affect: Mood and affect normal.         Behavior: Behavior normal.       Pelvic Exam:  Deferred at this time.      Assessment:       1. Urinary frequency    2. Urge incontinence of urine    3. Feeling of incomplete bladder emptying        Plan:       Urinary frequency- we will have her try myrbetriq 50 mg daily  -     POCT URINE DIPSTICK WITHOUT MICROSCOPE    Urge incontinence of urine- as noted above    Feeling of incomplete bladder emptying- bladder scan today revealed 55 ML.  Begin myrbetriq as noted above  -     POCT Bladder Scan        RTC 1 month

## 2022-11-14 ENCOUNTER — HOSPITAL ENCOUNTER (EMERGENCY)
Facility: HOSPITAL | Age: 47
Discharge: HOME | End: 2022-11-14
Attending: EMERGENCY MEDICINE
Payer: MEDICAID

## 2022-11-14 VITALS
WEIGHT: 115 LBS | BODY MASS INDEX: 23.18 KG/M2 | TEMPERATURE: 98 F | DIASTOLIC BLOOD PRESSURE: 71 MMHG | SYSTOLIC BLOOD PRESSURE: 113 MMHG | OXYGEN SATURATION: 98 % | HEIGHT: 59 IN | HEART RATE: 80 BPM | RESPIRATION RATE: 16 BRPM
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
## 2022-11-14 DIAGNOSIS — S16.1XXA CERVICAL STRAIN, ACUTE, INITIAL ENCOUNTER: Primary | ICD-10-CM

## 2022-11-14 PROCEDURE — 25000003 PHARM REV CODE 250: Performed by: PHYSICIAN ASSISTANT

## 2022-11-14 PROCEDURE — 99284 EMERGENCY DEPT VISIT MOD MDM: CPT

## 2022-11-14 PROCEDURE — 25000003 PHARM REV CODE 250: Performed by: EMERGENCY MEDICINE

## 2022-11-14 RX ORDER — METAXALONE 800 MG/1
800 TABLET ORAL 3 TIMES DAILY
Status: DISCONTINUED | OUTPATIENT
Start: 2022-11-14 | End: 2022-11-14 | Stop reason: HOSPADM

## 2022-11-14 RX ORDER — IBUPROFEN 600 MG/1
600 TABLET ORAL
Status: COMPLETED | OUTPATIENT
Start: 2022-11-14 | End: 2022-11-14

## 2022-11-14 RX ORDER — DICLOFENAC SODIUM 50 MG/1
50 TABLET, DELAYED RELEASE ORAL 3 TIMES DAILY
Qty: 15 TABLET | Refills: 0 | Status: SHIPPED | OUTPATIENT
Start: 2022-11-14 | End: 2023-11-27

## 2022-11-14 RX ORDER — CYCLOBENZAPRINE HCL 10 MG
10 TABLET ORAL 3 TIMES DAILY PRN
Qty: 15 TABLET | Refills: 0 | Status: SHIPPED | OUTPATIENT
Start: 2022-11-14 | End: 2022-11-19

## 2022-11-14 RX ORDER — NAPROXEN 250 MG/1
500 TABLET ORAL
Status: COMPLETED | OUTPATIENT
Start: 2022-11-14 | End: 2022-11-14

## 2022-11-14 RX ORDER — LIDOCAINE 50 MG/G
1 PATCH TOPICAL
Status: DISCONTINUED | OUTPATIENT
Start: 2022-11-14 | End: 2022-11-14 | Stop reason: HOSPADM

## 2022-11-14 RX ADMIN — IBUPROFEN 600 MG: 600 TABLET, FILM COATED ORAL at 02:11

## 2022-11-14 RX ADMIN — NAPROXEN 500 MG: 250 TABLET ORAL at 02:11

## 2022-11-14 RX ADMIN — LIDOCAINE 1 PATCH: 50 PATCH TOPICAL at 02:11

## 2022-11-14 RX ADMIN — METAXALONE 800 MG: 800 TABLET ORAL at 03:11
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
## 2023-01-06 DIAGNOSIS — R29.898 RIGHT LEG WEAKNESS: ICD-10-CM

## 2023-01-06 DIAGNOSIS — N39.498 OTHER URINARY INCONTINENCE: ICD-10-CM

## 2023-01-06 DIAGNOSIS — G45.9 TRANSIENT CEREBRAL ISCHEMIA, UNSPECIFIED TYPE: Primary | ICD-10-CM

## 2023-01-06 DIAGNOSIS — M54.2 CERVICALGIA: Primary | ICD-10-CM

## 2023-01-06 DIAGNOSIS — M54.41 LUMBAGO WITH SCIATICA, RIGHT SIDE: ICD-10-CM

## 2023-01-06 DIAGNOSIS — R29.2 HYPERREFLEXIA: ICD-10-CM

## 2023-01-17 ENCOUNTER — HOSPITAL ENCOUNTER (OUTPATIENT)
Dept: CARDIOLOGY | Facility: HOSPITAL | Age: 48
Discharge: HOME | End: 2023-01-17
Attending: NURSE PRACTITIONER
Payer: MEDICAID

## 2023-01-17 ENCOUNTER — HOSPITAL ENCOUNTER (OUTPATIENT)
Dept: RADIOLOGY | Facility: HOSPITAL | Age: 48
Discharge: HOME | End: 2023-01-17
Attending: NURSE PRACTITIONER
Payer: MEDICAID

## 2023-01-17 DIAGNOSIS — M54.41 LUMBAGO WITH SCIATICA, RIGHT SIDE: ICD-10-CM

## 2023-01-17 DIAGNOSIS — R29.2 HYPERREFLEXIA: ICD-10-CM

## 2023-01-17 DIAGNOSIS — M54.2 CERVICALGIA: ICD-10-CM

## 2023-01-17 DIAGNOSIS — N39.498 OTHER URINARY INCONTINENCE: ICD-10-CM

## 2023-01-17 DIAGNOSIS — G45.9 TRANSIENT CEREBRAL ISCHEMIA, UNSPECIFIED TYPE: ICD-10-CM

## 2023-01-17 DIAGNOSIS — R29.898 RIGHT LEG WEAKNESS: ICD-10-CM

## 2023-01-17 PROCEDURE — 93306 TTE W/DOPPLER COMPLETE: CPT

## 2023-01-17 PROCEDURE — 93306 ECHO (CUPID ONLY): ICD-10-PCS | Mod: 26,,, | Performed by: SPECIALIST

## 2023-01-17 PROCEDURE — 93880 EXTRACRANIAL BILAT STUDY: CPT | Mod: TC

## 2023-01-17 PROCEDURE — 72141 MRI NECK SPINE W/O DYE: CPT | Mod: TC

## 2023-01-17 PROCEDURE — 72148 MRI LUMBAR SPINE W/O DYE: CPT | Mod: TC

## 2023-01-17 PROCEDURE — 93306 TTE W/DOPPLER COMPLETE: CPT | Mod: 26,,, | Performed by: SPECIALIST

## 2023-01-19 LAB
AORTIC ROOT ANNULUS: 2.3 CM
AORTIC VALVE CUSP SEPERATION: 1.7 CM
AV INDEX (PROSTH): 1
AV MEAN GRADIENT: 4 MMHG
AV PEAK GRADIENT: 7 MMHG
AV VALVE AREA: 3.15 CM2
AV VELOCITY RATIO: 1.04
CV ECHO LV RWT: 0.26 CM
DOP CALC AO PEAK VEL: 1.3 M/S
DOP CALC AO VTI: 27.7 CM
DOP CALC LVOT AREA: 3.1 CM2
DOP CALC LVOT DIAMETER: 2 CM
DOP CALC LVOT PEAK VEL: 1.35 M/S
DOP CALC LVOT STROKE VOLUME: 87.29 CM3
DOP CALCLVOT PEAK VEL VTI: 27.8 CM
E WAVE DECELERATION TIME: 257 MSEC
E/A RATIO: 1.6
E/E' RATIO: 9.18 M/S
ECHO LV POSTERIOR WALL: 0.56 CM (ref 0.6–1.1)
EJECTION FRACTION: 70 %
FRACTIONAL SHORTENING: 41 % (ref 28–44)
INTERVENTRICULAR SEPTUM: 0.52 CM (ref 0.6–1.1)
IVRT: 106 MSEC
LEFT ATRIUM SIZE: 2.9 CM
LEFT INTERNAL DIMENSION IN SYSTOLE: 2.58 CM (ref 2.1–4)
LEFT VENTRICLE DIASTOLIC VOLUME: 86.8 ML
LEFT VENTRICLE SYSTOLIC VOLUME: 25.8 ML
LEFT VENTRICULAR INTERNAL DIMENSION IN DIASTOLE: 4.38 CM (ref 3.5–6)
LEFT VENTRICULAR MASS: 66.11 G
LV LATERAL E/E' RATIO: 7.77 M/S
LV SEPTAL E/E' RATIO: 11.22 M/S
LVOT MG: 4 MMHG
LVOT MV: 0.92 CM/S
MV PEAK A VEL: 0.63 M/S
MV PEAK E VEL: 1.01 M/S
MV STENOSIS PRESSURE HALF TIME: 77 MS
MV VALVE AREA P 1/2 METHOD: 2.86 CM2
PISA TR MAX VEL: 2.1 M/S
RA PRESSURE: 3 MMHG
RIGHT VENTRICULAR END-DIASTOLIC DIMENSION: 1.61 CM
TDI LATERAL: 0.13 M/S
TDI SEPTAL: 0.09 M/S
TDI: 0.11 M/S
TR MAX PG: 18 MMHG
TV REST PULMONARY ARTERY PRESSURE: 21 MMHG

## 2023-04-25 ENCOUNTER — CLINICAL SUPPORT (OUTPATIENT)
Dept: REHABILITATION | Facility: HOSPITAL | Age: 48
End: 2023-04-25
Payer: MEDICAID

## 2023-04-25 DIAGNOSIS — M54.2 CERVICALGIA: Primary | ICD-10-CM

## 2023-04-25 PROCEDURE — 97161 PT EVAL LOW COMPLEX 20 MIN: CPT | Mod: PN

## 2023-04-25 PROCEDURE — 97110 THERAPEUTIC EXERCISES: CPT | Mod: PN

## 2023-04-25 NOTE — PLAN OF CARE
[facility]   Physical Therapy Initial Evaluation      Name: Zhang Kurtz  [facility] Number: [id number]    Therapy Diagnosis:   Encounter Diagnosis   Name Primary?    Cervicalgia Yes        Physician: Mariza Poon DNP    Physician Orders: PT Eval and Treat   Medical Diagnosis from Referral: Cervicalgia.  Carpal tunnel syndrome,bilateral.  Cubital tunnel syndrome,bilateral.  Evaluation Date: 4/25/2023  Authorization Period Expiration: 12/31/23  Plan of Care Expiration: 6/30/23  Progress Note Due: 5/25/23  Visit # / Visits authorized: 1/ 1   FOTO: 45/50    Precautions: Standard     Time In: 0905  Time Out: 0950  Total Appointment Time (timed & untimed codes): 45 minutes    Subjective     Date of onset: Insidious    History of current condition - Zhang reports: neck pain started ~ 14 yrs ago without trauma,gradually getting worse,pt reports difficulties with ADL,functional activities,homemaking.    Falls: no    Imaging:   IMPRESSION:     Degenerative disc and facet changes as detailed above, most notably at the C5-6 and C6-7 levels. There is significant central spinal canal narrowing with impingement of the cervical spinal cord at C6-7 and associated cord signal alteration compatible with myelomalacia. There are corresponding changes of foraminal narrowing. See above details.     Prior Therapy: Not for this dx.  Social History: trailer lives alone  Occupation: [employer] .  Prior Level of Function: Independent.  Current Level of Function: Modified independent.    Pain:  Current 6/10, worst 10/10, best 3/10   Location: bilateral neck    Description: Aching, Dull, Throbbing, Tingling, Numb, Sharp, and Variable  Aggravating Factors: Bending, Walking, Extension, Flexing, and Lifting  Easing Factors: relaxation, pain medication, and rest    Patients goals: pain relief.     Medical History:   Past Medical History:   Diagnosis Date    Abnormal Pap smear     High grade squamous intraepithelial  lesion.    Anxiety     Breast disorder     lumps removed in the past,  bilateral    Depression     Dysplasia of cervix     s/p CKC    Fatty liver     noted on 1/12 USG    Generalized headaches     Hyperlipidemia     Increased prolactin level     Pituitary microadenoma with hyperprolactinemia        Surgical History:   Zhang Kurtz  has a past surgical history that includes Cervical conization w/ laser and Breast biopsy.    Medications:   Zhang has a current medication list which includes the following prescription(s): albuterol, alprazolam, baclofen, bupropion, clonazepam, diclofenac, duloxetine, ergocalciferol, meloxicam, mirtazapine, ondansetron, zolpidem, and [DISCONTINUED] gabapentin.    Allergies:   Review of patient's allergies indicates:   Allergen Reactions    Codeine Nausea Only        Objective        Cervical Lumbar AROM: Pain/Dysfunction with Movement:   Flexion 30    Extension  40    Right side bending 25    Left side bending 20    Right rotation  70    Left rotation  60      Strength of c/spine is grossly 3-/5 in all planes.  Posture;head forward,scoliosis,right shoulder elevated.  Special tests;  Compression test on c/spine;neg.  C5 test;pos bilaterally.  ; RT;50, LT;50#the patient is right handed.  Palpation;cervical multifidus,upper traps tender.     Limitation/Restriction for FOTO neck Survey    Therapist reviewed FOTO scores for Zhang Kurtz on 4/25/2023.   FOTO documents entered into [employer] - see Media section.    Limitation Score: 55%         Treatment     Total Treatment time (time-based codes) separate from Evaluation: 8 minutes      Zhang received the treatments listed below:      therapeutic exercises to develop strength for 8 minutes including:  ZACK 4/4'    Patient Education and Home Exercises     Education provided:   Role of PT  - POC    Assessment     Zhang is a 47 y.o. female referred to outpatient Physical Therapy with a medical diagnosis of cervicalgia. Patient presents with  ROM and strength deficits,poor posture    Patient prognosis is Good.   Patient will benefit from skilled outpatient Physical Therapy to address the deficits stated above and in the chart below, provide patient /family education, and to maximize patientt's level of independence.     Plan of care discussed with patient: Yes  Patient's spiritual, cultural and educational needs considered and patient is agreeable to the plan of care and goals as stated below:     Anticipated Barriers for therapy: no    Medical Necessity is demonstrated by the following  History  Co-morbidities and personal factors that may impact the plan of care Co-morbidities:     Personal Factors:   no deficits     low   Examination  Body Structures and Functions, activity limitations and participation restrictions that may impact the plan of care Body Regions:   neck    Body Systems:    gross symmetry  ROM  strength    Participation Restrictions:   no    Activity limitations:   Learning and applying knowledge  no deficits    General Tasks and Commands  no deficits    Communication  no deficits    Mobility  lifting and carrying objects    Self care  no deficits    Domestic Life  no deficits    Interactions/Relationships  no deficits    Life Areas  no deficits    Community and Social Life  no deficits         low   Clinical Presentation stable and uncomplicated low   Decision Making/ Complexity Score: low     Goals:  Short Term Goals (STG) # weeks Goal Review Date Reviewed Date Met   Pt will demonstrate independence with initial HEP to facilitate therex progression and improvements in functional mobility 1 Initial 4/25/2023    Pt will increase cervical active range of motion to greater than 5 degrees for all planes to facilitate driving safety 3 Initial 4/25/2023    Decrease pain x 1 grade. 3 Initial 4/25/2023 4/25/2023 4/25/2023 4/25/2023 4/25/2023 4/25/2023 4/25/2023 4/25/2023      Long Term Goals (LTG) #  weeks Goal Review Date Reviewed Date Met   The patient will improve the Neck Disability Index to less than 35% Disability 6 Initial 4/25/2023    The patient will demonstrate increased cervical active range of motion to greater than 10 degrees for all planes in order for patient to drive safely without limitations 6 Initial 4/25/2023    Tolerable pain level 0-4/10. 6 Initial 4/25/2023    Strength of c/spine 5/5 in all planes to improve function   4/25/2023    Normalize posture to improve mechanics. 6 Initial 4/25/2023    Pt will return to previous LOF 6 Initial 4/25/2023 4/25/2023 4/25/2023 4/25/2023 4/25/2023       Plan     Plan of care Certification: 4/25/2023 to 6/30/23.    Outpatient Physical Therapy 2 times weekly for 6 weeks to include the following interventions: Cervical/Lumbar Traction, Electrical Stimulation PRN, Manual Therapy, Moist Heat/ Ice, Patient Education, Therapeutic Activities, Therapeutic Exercise, and Ultrasound. Dry needling PRN.IMS PRN.    Guilherme Michelle, PT

## 2023-04-26 DIAGNOSIS — G56.23 CUBITAL TUNNEL SYNDROME, BILATERAL: ICD-10-CM

## 2023-04-26 DIAGNOSIS — G56.03 CARPAL TUNNEL SYNDROME ON BOTH SIDES: ICD-10-CM

## 2023-04-26 DIAGNOSIS — M54.2 CERVICALGIA: Primary | ICD-10-CM

## 2023-05-02 ENCOUNTER — CLINICAL SUPPORT (OUTPATIENT)
Dept: REHABILITATION | Facility: HOSPITAL | Age: 48
End: 2023-05-02
Attending: NURSE PRACTITIONER
Payer: MEDICAID

## 2023-05-02 DIAGNOSIS — M54.2 NECK PAIN: Primary | ICD-10-CM

## 2023-05-02 PROCEDURE — 97110 THERAPEUTIC EXERCISES: CPT | Mod: PN
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
## 2023-05-04 ENCOUNTER — CLINICAL SUPPORT (OUTPATIENT)
Dept: REHABILITATION | Facility: HOSPITAL | Age: 48
End: 2023-05-04
Payer: MEDICAID

## 2023-05-04 DIAGNOSIS — M54.2 NECK PAIN: Primary | ICD-10-CM

## 2023-05-04 PROCEDURE — 97110 THERAPEUTIC EXERCISES: CPT | Mod: PN

## 2023-05-04 NOTE — PROGRESS NOTES
[facility]   Physical Therapy Treatment Note      Name: Zhang Kurtz  Clinic Number: [id number]    Therapy Diagnosis:   Encounter Diagnosis   Name Primary?    Neck pain Yes     Physician: Mariza Poon DNP    Visit Date: 5/4/2023       Physician Orders: PT Eval and Treat   Medical Diagnosis from Referral: Cervicalgia.  Carpal tunnel syndrome,bilateral.  Cubital tunnel syndrome,bilateral.  Evaluation Date: 4/25/2023  Authorization Period Expiration: 12/31/23  Plan of Care Expiration: 6/30/23  Progress Note Due: 5/25/23  Visit # / Visits authorized: 1/ 1   FOTO: 45/50     Precautions: Standard      PTA Visit #: 0/5     Time In: 0906  Time Out: 0951  Total Billable Time: 45 minutes    Subjective     Pt reports:pt late  She was compliant with home exercise program.  Response to previous treatment: positive  Functional change: no    Pain: 6/10  Location: bilateral neck      Objective      Objective Measures updated at progress report unless specified.     Treatment     Zhang received the treatments listed below:      therapeutic exercises to develop strength, ROM, flexibility, and posture for 30 minutes including:  UBE 5/5'  Shoulder shrugs/rolls/retractions 30.  OH pulley flex 3'.abd 3'  ROM c/spine;  LR/RR 10/10  SBL/RT 10/10   CC;SHOULDER EXT/ROWING 3# 3X10  supervised modalities after being cleared for contradictions: Mechanical Traction:  Zhang received intermittent mechanical traction to the cervical spine at a force of 15/5 pounds for a total of 15 minutes. Hold time of 40s and rest time for 10s. Patient tolerated treatment well without any adverse effects.      Patient Education and Home Exercises       Education provided:   - Posture ed.    Assessment     Requires constant v.c for posture    Zhang Is progressing well towards her goals.   Pt prognosis is Fair/good.     Pt will continue to benefit from skilled outpatient physical therapy to address the deficits listed in the  problem list box on initial evaluation, provide pt/family education and to maximize pt's level of independence in the home and community environment.     Pt's spiritual, cultural and educational needs considered and pt agreeable to plan of care and goals.     Anticipated barriers to physical therapy: no    Goals:   Short Term Goals (STG) # weeks Goal Review Date Reviewed Date Met   Pt will demonstrate independence with initial HEP to facilitate therex progression and improvements in functional mobility 1 Initial 4/25/2023     Pt will increase cervical active range of motion to greater than 5 degrees for all planes to facilitate driving safety 3 Initial 4/25/2023     Decrease pain x 1 grade. 3 Initial 4/25/2023 4/25/2023 4/25/2023 4/25/2023 4/25/2023 4/25/2023 4/25/2023 4/25/2023        Long Term Goals (LTG) # weeks Goal Review Date Reviewed Date Met   The patient will improve the Neck Disability Index to less than 35% Disability 6 Initial 4/25/2023     The patient will demonstrate increased cervical active range of motion to greater than 10 degrees for all planes in order for patient to drive safely without limitations 6 Initial 4/25/2023     Tolerable pain level 0-4/10. 6 Initial 4/25/2023     Strength of c/spine 5/5 in all planes to improve function     4/25/2023     Normalize posture to improve mechanics. 6 Initial 4/25/2023     Pt will return to previous LOF 6 Initial 4/25/2023 4/25/2023               Plan     Per POC.    Guilherme Michelle, PT

## 2023-05-08 ENCOUNTER — OFFICE VISIT (OUTPATIENT)
Dept: ORTHOPEDICS | Facility: CLINIC | Age: 48
End: 2023-05-08
Payer: MEDICAID

## 2023-05-08 VITALS
HEIGHT: 58 IN | WEIGHT: 119 LBS | BODY MASS INDEX: 24.98 KG/M2 | DIASTOLIC BLOOD PRESSURE: 68 MMHG | SYSTOLIC BLOOD PRESSURE: 120 MMHG

## 2023-05-08 DIAGNOSIS — M50.30 DISC DISEASE, DEGENERATIVE, CERVICAL: ICD-10-CM

## 2023-05-08 DIAGNOSIS — M48.061 FORAMINAL STENOSIS OF LUMBAR REGION: ICD-10-CM

## 2023-05-08 DIAGNOSIS — M51.36 BULGING OF LUMBAR INTERVERTEBRAL DISC: ICD-10-CM

## 2023-05-08 DIAGNOSIS — M50.30 BULGING OF CERVICAL INTERVERTEBRAL DISC: ICD-10-CM

## 2023-05-08 DIAGNOSIS — M51.36 DISC DEGENERATION, LUMBAR: ICD-10-CM

## 2023-05-08 DIAGNOSIS — G95.9 CERVICAL MYELOPATHY: Primary | ICD-10-CM

## 2023-05-08 DIAGNOSIS — M48.02 FORAMINAL STENOSIS OF CERVICAL REGION: ICD-10-CM

## 2023-05-08 PROCEDURE — 1159F MED LIST DOCD IN RCRD: CPT | Mod: CPTII,S$GLB,, | Performed by: ORTHOPAEDIC SURGERY

## 2023-05-08 PROCEDURE — 3044F PR MOST RECENT HEMOGLOBIN A1C LEVEL <7.0%: ICD-10-PCS | Mod: CPTII,S$GLB,, | Performed by: ORTHOPAEDIC SURGERY

## 2023-05-08 PROCEDURE — 1159F PR MEDICATION LIST DOCUMENTED IN MEDICAL RECORD: ICD-10-PCS | Mod: CPTII,S$GLB,, | Performed by: ORTHOPAEDIC SURGERY

## 2023-05-08 PROCEDURE — 3008F PR BODY MASS INDEX (BMI) DOCUMENTED: ICD-10-PCS | Mod: CPTII,S$GLB,, | Performed by: ORTHOPAEDIC SURGERY

## 2023-05-08 PROCEDURE — 3044F HG A1C LEVEL LT 7.0%: CPT | Mod: CPTII,S$GLB,, | Performed by: ORTHOPAEDIC SURGERY

## 2023-05-08 PROCEDURE — 1160F RVW MEDS BY RX/DR IN RCRD: CPT | Mod: CPTII,S$GLB,, | Performed by: ORTHOPAEDIC SURGERY

## 2023-05-08 PROCEDURE — 99204 OFFICE O/P NEW MOD 45 MIN: CPT | Mod: S$GLB,,, | Performed by: ORTHOPAEDIC SURGERY

## 2023-05-08 PROCEDURE — 1160F PR REVIEW ALL MEDS BY PRESCRIBER/CLIN PHARMACIST DOCUMENTED: ICD-10-PCS | Mod: CPTII,S$GLB,, | Performed by: ORTHOPAEDIC SURGERY

## 2023-05-08 PROCEDURE — 3074F SYST BP LT 130 MM HG: CPT | Mod: CPTII,S$GLB,, | Performed by: ORTHOPAEDIC SURGERY

## 2023-05-08 PROCEDURE — 99204 PR OFFICE/OUTPT VISIT, NEW, LEVL IV, 45-59 MIN: ICD-10-PCS | Mod: S$GLB,,, | Performed by: ORTHOPAEDIC SURGERY

## 2023-05-08 PROCEDURE — 3008F BODY MASS INDEX DOCD: CPT | Mod: CPTII,S$GLB,, | Performed by: ORTHOPAEDIC SURGERY

## 2023-05-08 PROCEDURE — 3078F PR MOST RECENT DIASTOLIC BLOOD PRESSURE < 80 MM HG: ICD-10-PCS | Mod: CPTII,S$GLB,, | Performed by: ORTHOPAEDIC SURGERY

## 2023-05-08 PROCEDURE — 3078F DIAST BP <80 MM HG: CPT | Mod: CPTII,S$GLB,, | Performed by: ORTHOPAEDIC SURGERY

## 2023-05-08 PROCEDURE — 3074F PR MOST RECENT SYSTOLIC BLOOD PRESSURE < 130 MM HG: ICD-10-PCS | Mod: CPTII,S$GLB,, | Performed by: ORTHOPAEDIC SURGERY

## 2023-05-08 RX ORDER — GABAPENTIN 300 MG/1
300 CAPSULE ORAL 3 TIMES DAILY
COMMUNITY
Start: 2023-04-06

## 2023-05-08 RX ORDER — MIRTAZAPINE 30 MG/1
30 TABLET, FILM COATED ORAL NIGHTLY
COMMUNITY
Start: 2023-02-19 | End: 2023-11-27 | Stop reason: ALTCHOICE

## 2023-05-08 RX ORDER — CLONAZEPAM 1 MG/1
1 TABLET ORAL 2 TIMES DAILY PRN
COMMUNITY
Start: 2022-12-14 | End: 2023-11-27

## 2023-05-08 RX ORDER — SERTRALINE HYDROCHLORIDE 50 MG/1
100 TABLET, FILM COATED ORAL
COMMUNITY
End: 2023-11-27

## 2023-05-08 RX ORDER — CYPROHEPTADINE HYDROCHLORIDE 4 MG/1
1 TABLET ORAL
COMMUNITY
End: 2023-11-27

## 2023-05-08 RX ORDER — QUETIAPINE FUMARATE 50 MG/1
50 TABLET, FILM COATED ORAL NIGHTLY
COMMUNITY
Start: 2023-03-19 | End: 2023-11-27

## 2023-05-08 NOTE — PROGRESS NOTES
Subjective:       Patient ID: Zhang Kurtz is a 47 y.o. female.    Chief Complaint: Pain of the Lumbar Spine (Lumbar pain x years, pain down right leg to foot, numbness right leg, weakness right leg, had MRI done 1/17/23)      History of Present Illness    Prior to meeting with the patient I reviewed the medical chart in [facility]. This included reviewing the previous progress notes from our office, review of the patient's last appointment with their primary care provider, review of any visits to the emergency room, and review of any pain management appointments or procedures.   Patient has 10 year history of neck pain and back pain with numbness and tingling in both the arms and the right leg has had physical therapy in the past and treated by neural care undergoing an epidural shot in the cervical spine many years ago.  Over the past year she has had difficulty with ambulation feeling unsteady she does have urinary urgency but no incontinence of bowel incontinence neck pain is greater than low back pain.    Current Medications  Current Outpatient Medications   Medication Sig Dispense Refill    clonazePAM (KLONOPIN) 1 MG tablet Take 1 mg by mouth 2 (two) times daily as needed.      cyproheptadine (PERIACTIN) 4 mg tablet 1 tablet.      ergocalciferol (ERGOCALCIFEROL) 50,000 unit Cap Take 1 capsule (50,000 Units total) by mouth every 7 days. 12 capsule 3    gabapentin (NEURONTIN) 300 MG capsule Take 300 mg by mouth 3 (three) times daily.      mirtazapine (REMERON) 30 MG tablet Take 30 mg by mouth every evening.      QUEtiapine (SEROQUEL) 50 MG tablet Take 50 mg by mouth every evening.      sertraline (ZOLOFT) 50 MG tablet 1 tablet.      albuterol (PROVENTIL/VENTOLIN HFA) 90 mcg/actuation inhaler Inhale 1-2 puffs into the lungs every 6 (six) hours as needed for Wheezing. Rescue 1 g 0    alprazolam (XANAX) 2 MG Tab Take 1 tablet (2 mg total) by mouth 3 (three) times daily as needed. 90 tablet 2    baclofen (LIORESAL) 20 MG  tablet Take 1 tablet (20 mg total) by mouth 3 (three) times daily. 90 tablet 3    buPROPion (WELLBUTRIN SR) 150 MG TBSR 12 hr tablet Take 1 tablet (150 mg total) by mouth 2 (two) times daily. 60 tablet 3    clonazePAM (KLONOPIN) 0.5 MG tablet Take 0.5 mg by mouth once daily.      diclofenac (VOLTAREN) 50 MG EC tablet Take 1 tablet (50 mg total) by mouth 3 (three) times daily. 15 tablet 0    duloxetine (CYMBALTA) 30 MG capsule Take 1 capsule (30 mg total) by mouth once daily. 30 capsule 2    meloxicam (MOBIC) 15 MG tablet Take 1 tablet (15 mg total) by mouth once daily. 30 tablet 0    ondansetron (ZOFRAN-ODT) 8 MG TbDL Take 1 tablet (8 mg total) by mouth every 6 (six) hours as needed. 20 tablet 1    zolpidem (AMBIEN) 5 MG Tab Take 1 tablet (5 mg total) by mouth nightly as needed. 30 tablet 0     No current facility-administered medications for this visit.       Allergies  Review of patient's allergies indicates:   Allergen Reactions    Codeine Nausea Only       Past Medical History  Past Medical History:   Diagnosis Date    Abnormal Pap smear     High grade squamous intraepithelial lesion.    Anxiety     Breast disorder     lumps removed in the past,  bilateral    Depression     Dysplasia of cervix     s/p CKC    Fatty liver     noted on 1/12 USG    Generalized headaches     Hyperlipidemia     Increased prolactin level     Pituitary microadenoma with hyperprolactinemia        Surgical History  Past Surgical History:   Procedure Laterality Date    BREAST BIOPSY      bening    CERVICAL CONIZATION   W/ LASER         Family History:   Family History   Problem Relation Age of Onset    Cancer Mother         lymph node CA    Asthma Daughter     Anesthesia problems Neg Hx     Clotting disorder Neg Hx        Social History:   Social History     Socioeconomic History    Marital status: Single    Number of children: 2   Occupational History    Occupation: administrative coordinatory   Tobacco Use    Smoking status: Every Day      Packs/day: 1.00     Years: 10.00     Pack years: 10.00     Types: Cigars, Cigarettes    Smokeless tobacco: Never    Tobacco comments:     cigars   Substance and Sexual Activity    Alcohol use: Yes     Alcohol/week: 1.0 standard drink     Types: 1 Standard drinks or equivalent per week    Drug use: No    Sexual activity: Yes     Partners: Male     Birth control/protection: None   Social History Narrative    ** Merged History Encounter **            Hospitalization/Major Diagnostic Procedure:     Review of Systems     General/Constitutional:  Chills denies. Fatigue denies. Fever denies. Weight gain denies. Weight loss denies.    Respiratory:  Shortness of breath denies.    Cardiovascular:  Chest pain denies.    Gastrointestinal:  Constipation denies. Diarrhea denies. Nausea denies. Vomiting denies.     Hematology:  Easy bruising denies. Prolonged bleeding denies.     Genitourinary:  Frequent urination denies. Pain in lower back denies. Painful urination denies.     Musculoskeletal:  See HPI for details    Skin:  Rash denies.    Neurologic:  Dizziness denies. Gait abnormalities denies. Seizures denies. Tingling/Numbess denies.    Psychiatric:  Anxiety denies. Depressed mood denies.     Objective:   Vital Signs:   Vitals:    05/08/23 1329   BP: 120/68        Physical Exam      General Examination:     Constitutional: The patient is alert and oriented to lace person and time. Mood is pleasant.     Head/Face: Normal facial features normal eyebrows    Eyes: Normal extraocular motion bilaterally    Lungs: Respirations are equal and unlabored    Gait is coordinated.    Cardiovascular: There are no swelling or varicosities present.    Lymphatic: Negative for adenopathy    Skin: Normal    Neurological: Level of consciousness normal. Oriented to place person and time and situation    Psychiatric: Oriented to time place person and situation    Patient walks with a normal gait pattern.  Cervical exam shows moderate bilateral  paraspinous muscle tenderness and mild spasm.  Spurling's maneuver positive on the right Rajni test positive bilaterally hyper reflexia upper and lower extremities 3 beats of clonus both lower extremities.  Cervical exam range of motion limited by pain.  Lumbar exam moderate tenderness L3-S1 paraspinous muscles in both posterior iliac spines without spasm range of motion limited 40%.  Straight-leg-raising negative on the right.  Hyper reflexia both lower extremities with sustained 3-4 be clonus.  Motor exam grossly intact both lower extremities.    XRAY Report/ Interpretation :  I lumbar MRI was personally reviewed and reviewed compared to the radiologist's interpretation diminished signal intensity posterior bulging desiccation L5-S1 disc with bilateral neural foraminal stenosis.  Please see report for details.    Cervical MRI was personally reviewed and my impressions compared the radiologist.  Desiccation C5-6 C6-7 with anterior posterior osteophyte formation.  Change in signal intensity of the spinal cord at the C6-7 level suggestive of myelomalacia.  Bilateral foraminal stenosis.  Please see report for details.      Assessment:       1. Cervical myelopathy    2. Bulging of cervical intervertebral disc    3. Bulging of lumbar intervertebral disc    4. Disc degeneration, lumbar    5. Disc disease, degenerative, cervical    6. Foraminal stenosis of lumbar region    7. Foraminal stenosis of cervical region        Plan:       Zhang was seen today for pain.    Diagnoses and all orders for this visit:    Cervical myelopathy  -     Ambulatory referral/consult to Pain Clinic; Future    Bulging of cervical intervertebral disc    Bulging of lumbar intervertebral disc    Disc degeneration, lumbar    Disc disease, degenerative, cervical  -     Ambulatory referral/consult to Pain Clinic; Future    Foraminal stenosis of lumbar region    Foraminal stenosis of cervical region  -     Ambulatory referral/consult to Pain  Clinic; Future         Follow up in about 4 weeks (around 6/5/2023) for CAROLANN Inj f/up.    The nature of the condition of cervical myelopathy was discussed in detail the natural history was discussed in the probability of progression also I did discuss the signal changes in the spinal cord which is a concerning prognosis.  She is not ready to have surgery we have referred her for an epidural steroid injection and realistic expectations of the injection with his described in that it may help her symptoms but not correct her pathology I have explained to her that she probably will need to have surgery to her cervical spine we will defer treatment to her lumbar spine at this time though her lumbar Treatment options or injections versus surgery at L5-S1    The risk associated with the spinal condition and an worsening of the condition was discussed with the patient expressed a thorough understanding.  The patient was warned about the possible development of cauda equina syndrome and its symptoms which include but are not limited to bowel or bladder dysfunction sexual dysfunction increasing pain increasing extremity numbness or weakness and saddle anesthesia.  The patient was advised to either contact me immediately or to go to the nearest hospital emergency room if symptoms of cauda equina syndrome with to develop.  The patient expressed an understanding of these instructions.      Treatment options were discussed with regards to the nature of the medical condition. Conservative pain intervention and surgical options were discussed in detail. The probability of success of each separate treatment option was discussed. The patient expressed a clear understanding of the treatment options. With regards to surgery, the procedure risk, benefits, complications, and outcomes were discussed. No guarantees were given with regards to surgical outcome.   The risk of complications, morbidity, and mortality of patient management  decisions have been made at the time of this visit. These are associated with the patient's problems, diagnostic procedures and treatment options. This includes the possible management options selected and those considered but not selected by the patient after shared medical decision making we discussed with the patient.   This note was created using Dragon voice recognition software that occasionally misinterpreted phrases or words.

## 2023-05-11 ENCOUNTER — CLINICAL SUPPORT (OUTPATIENT)
Dept: REHABILITATION | Facility: HOSPITAL | Age: 48
End: 2023-05-11
Payer: MEDICAID

## 2023-05-11 DIAGNOSIS — M54.2 CERVICALGIA: ICD-10-CM

## 2023-05-11 DIAGNOSIS — M54.2 NECK PAIN: Primary | ICD-10-CM

## 2023-05-11 PROCEDURE — 97110 THERAPEUTIC EXERCISES: CPT | Mod: PN,CQ

## 2023-05-11 NOTE — PROGRESS NOTES
[facility]   Physical Therapy Treatment Note      Name: Zhang Kurtz  Clinic Number: [id number]    Therapy Diagnosis:   Encounter Diagnoses   Name Primary?    Neck pain Yes    Cervicalgia      Physician: Mariza Poon DNP    Visit Date: 5/11/2023       Physician Orders: PT Eval and Treat   Medical Diagnosis from Referral: Cervicalgia.  Carpal tunnel syndrome,bilateral.  Cubital tunnel syndrome,bilateral.  Evaluation Date: 4/25/2023  Authorization Period Expiration: 12/31/23  Plan of Care Expiration: 6/30/23  Progress Note Due: 5/25/23  Visit # / Visits authorized: 2/ 12  FOTO: 45/50     Precautions: Standard      PTA Visit #: 1/5     Time In: 1000  Time Out: 1045  Total Billable Time: 45 minutes    Subjective     Pt reports: doing the same.   She was not compliant with home exercise program.  Response to previous treatment: no complaints   Functional change: ongoing     Pain: 6/10  Location: bilateral neck      Objective      Objective Measures updated at progress report unless specified.     Treatment     Zhang received the treatments listed below:      therapeutic exercises to develop strength, ROM, flexibility, and posture for 30 minutes including:    UBE 5/5'  Shoulder shrugs/rolls/retractions 30   OH pulley flex 3'.abd 3'  Chin tucks   ROM c/spine   LR/RR 10/10  SBL/RT 10/10   CC;SHOULDER EXT3# /ROWING 7# 3X10    supervised modalities after being cleared for contradictions: Mechanical Traction:  Zhang received intermittent mechanical traction to the cervical spine at a force of 15/5 pounds for a total of 15 minutes. Hold time of 40s and rest time for 10s. Patient tolerated treatment well without any adverse effects.      Patient Education and Home Exercises       Education provided:   - Posture ed.    Assessment     Patient tolerated session fair with focus on cervical range of motion, postural strength and awareness. Cueing for strengthening exercises to focus on control and  postural strength. Continue to progress as able.     Zhang Is progressing well towards her goals.   Pt prognosis is Fair/good.     Pt will continue to benefit from skilled outpatient physical therapy to address the deficits listed in the problem list box on initial evaluation, provide pt/family education and to maximize pt's level of independence in the home and community environment.     Pt's spiritual, cultural and educational needs considered and pt agreeable to plan of care and goals.     Anticipated barriers to physical therapy: no    Goals:   Short Term Goals (STG) # weeks Goal Review Date Reviewed Date Met   Pt will demonstrate independence with initial HEP to facilitate therex progression and improvements in functional mobility 1 Ongoing 5/11/2023       Pt will increase cervical active range of motion to greater than 5 degrees for all planes to facilitate driving safety 3 Ongoing 5/11/2023       Decrease pain x 1 grade. 3 Ongoing 5/11/2023                                                                                       Long Term Goals (LTG) # weeks Goal Review Date Reviewed Date Met   The patient will improve the Neck Disability Index to less than 35% Disability 6 Ongoing 5/11/2023       The patient will demonstrate increased cervical active range of motion to greater than 10 degrees for all planes in order for patient to drive safely without limitations 6 Ongoing 5/11/2023       Tolerable pain level 0-4/10. 6 Ongoing 5/11/2023       Strength of c/spine 5/5 in all planes to improve function   Ongoing 5/11/2023       Normalize posture to improve mechanics. 6 Ongoing 5/11/2023       Pt will return to previous LOF 6 Ongoing 5/11/2023                            Plan     Per POC.    Aniya Toledo, PTA

## 2023-05-16 ENCOUNTER — CLINICAL SUPPORT (OUTPATIENT)
Dept: REHABILITATION | Facility: HOSPITAL | Age: 48
End: 2023-05-16
Payer: MEDICAID

## 2023-05-16 DIAGNOSIS — M54.2 CERVICALGIA: Primary | ICD-10-CM

## 2023-05-16 PROCEDURE — 97110 THERAPEUTIC EXERCISES: CPT | Mod: PN,CQ

## 2023-05-16 NOTE — PROGRESS NOTES
[facility]   Physical Therapy Treatment Note      Name: Zhang Kurtz  Clinic Number: [id number]    Therapy Diagnosis:   Encounter Diagnosis   Name Primary?    Cervicalgia Yes       Physician: Mariza Poon DNP    Visit Date: 5/16/2023       Physician Orders: PT Eval and Treat   Medical Diagnosis from Referral: Cervicalgia.  Carpal tunnel syndrome,bilateral.  Cubital tunnel syndrome,bilateral.  Evaluation Date: 4/25/2023  Authorization Period Expiration: 12/31/23  Plan of Care Expiration: 6/30/23  Progress Note Due: 5/25/23  Visit # / Visits authorized: 4 / 12  ([id number])  FOTO: 45/50     Precautions: Standard       Time In: 1028  Time Out: 1055  Total Billable Time: 25 minutes    Subjective     Pt reports: not sure if she is better or not  She was not compliant with home exercise program.  Response to previous treatment: no issues   Functional change: ongoing     Pain: 5/10  Location: bilateral neck      Objective      Objective Measures updated at progress report unless specified.     Treatment     Zhang received the treatments listed below:      therapeutic exercises to develop strength, ROM, flexibility, and posture for 27 minutes including:    UBE 5/5', level 2    Chin tucks against wall with towel roll x 20  SA roll with bolster against wall x 30    Bilateral External Rotation Red Theraband x 30    Cable column:: 3# rows, extension x 30 each    NOT PERFORMED:  Shoulder shrugs/rolls/retractions 30   OH pulley flex 3'.abd 3'  NOT PERFORMED   ROM c/spine   LR/RR 10/10  SBL/RT 10/10       NOT PERFORMED - late arrival  supervised modalities after being cleared for contradictions: Mechanical Traction:  Zhang received intermittent mechanical traction to the cervical spine at a force of 15/5 pounds for a total of 15 minutes. Hold time of 40s and rest time for 10s. Patient tolerated treatment well without any adverse effects.      Patient Education and Home Exercises       Education  provided:   - Posture ed.    Assessment     Zhang presents with reported pain in cervical and Upper Trap regions.  Added additional PRE's with good tolerance.  Unable to complete full exercise list due to late arrival.      Zhang Is progressing well towards her goals.   Pt prognosis is Fair/good.     Pt will continue to benefit from skilled outpatient physical therapy to address the deficits listed in the problem list box on initial evaluation, provide pt/family education and to maximize pt's level of independence in the home and community environment.     Pt's spiritual, cultural and educational needs considered and pt agreeable to plan of care and goals.     Anticipated barriers to physical therapy: no    Goals:   Short Term Goals (STG) # weeks Goal Review Date Reviewed Date Met   Pt will demonstrate independence with initial HEP to facilitate therex progression and improvements in functional mobility 1 Ongoing 5/16/2023       Pt will increase cervical active range of motion to greater than 5 degrees for all planes to facilitate driving safety 3 Ongoing 5/16/2023       Decrease pain x 1 grade. 3 Ongoing 5/16/2023          Long Term Goals (LTG) # weeks Goal Review Date Reviewed Date Met   The patient will improve the Neck Disability Index to less than 35% Disability 6 Ongoing 5/16/2023       The patient will demonstrate increased cervical active range of motion to greater than 10 degrees for all planes in order for patient to drive safely without limitations 6 Ongoing 5/16/2023       Tolerable pain level 0-4/10. 6 Ongoing 5/16/2023       Strength of c/spine 5/5 in all planes to improve function   Ongoing 5/16/2023       Normalize posture to improve mechanics. 6 Ongoing 5/16/2023       Pt will return to previous LOF 6 Ongoing 5/16/2023         Plan     Cont per Plan of Care, posture, cervical strengthening    Jie Oneil, PTA

## 2023-05-22 ENCOUNTER — DOCUMENTATION ONLY (OUTPATIENT)
Dept: REHABILITATION | Facility: HOSPITAL | Age: 48
End: 2023-05-22
Payer: MEDICAID

## 2023-05-22 NOTE — PROGRESS NOTES
PT/PTA met face to face to discuss pt's treatment plan and progress towards established goals. Pt will be seen by a physical therapist minimally every 6th visit or every 30 days.    Jie Oneil PTA

## 2023-05-23 ENCOUNTER — CLINICAL SUPPORT (OUTPATIENT)
Dept: REHABILITATION | Facility: HOSPITAL | Age: 48
End: 2023-05-23
Payer: MEDICAID

## 2023-05-23 DIAGNOSIS — M54.2 NECK PAIN: Primary | ICD-10-CM

## 2023-05-23 PROCEDURE — 97110 THERAPEUTIC EXERCISES: CPT | Mod: PN

## 2023-05-23 NOTE — PROGRESS NOTES
[facility]   Physical Therapy Treatment Note      Name: Zhang Kurtz  Clinic Number: [id number]    Therapy Diagnosis:   Encounter Diagnosis   Name Primary?    Neck pain Yes       Physician: Mariza Poon DNP    Visit Date: 5/23/2023       Physician Orders: PT Eval and Treat   Medical Diagnosis from Referral: Cervicalgia.  Carpal tunnel syndrome,bilateral.  Cubital tunnel syndrome,bilateral.  Evaluation Date: 4/25/2023  Authorization Period Expiration: 12/31/23  Plan of Care Expiration: 6/30/23  Progress Note Due: 5/25/23  Visit # / Visits authorized: 4 / 12  ([id number])  FOTO: 45/50     Precautions: Standard       Time In: 1015 pt late  Time Out: 1055  Total Billable Time: 38 minutes    Subjective     Pt reports: not sure if she is better or not  She was not compliant with home exercise program.  Response to previous treatment: no issues   Functional change: ongoing     Pain: 5/10  Location: bilateral neck      Objective      Objective Measures updated at progress report unless specified.     Treatment     Zhang received the treatments listed below:      therapeutic exercises to develop strength, ROM, flexibility, and posture for 23 minutes including:    UBE 5/5', level 2  OH pulley FLEX 3', ABD 3'.  Chin tucks against wall with towel roll x 20  SA roll with bolster against wall x 30    Bilateral External Rotation Red Theraband x 30 NP    Cable column:: 3# rows, extension x 30 each NP    Shoulder shrugs/rolls/retractions 30   OH pulley flex 3'.abd 3'    ROM c/spine   LR/RR 10/10  SBL/RT 10/10     supervised modalities after being cleared for contradictions: Mechanical Traction:  Zhang received intermittent mechanical traction to the cervical spine at a force of 15/5 pounds for a total of 15 minutes. Hold time of 40s and rest time for 10s. Patient tolerated treatment well without any adverse effects.      Patient Education and Home Exercises       Education provided:   - Posture  ed.    Assessment     Zhang presents with reported pain in cervical and Upper Trap regions.  Added additional PRE's with good tolerance.  Unable to complete full exercise list due to late arrival.      Zhang Is progressing well towards her goals.   Pt prognosis is Fair/good.     Pt will continue to benefit from skilled outpatient physical therapy to address the deficits listed in the problem list box on initial evaluation, provide pt/family education and to maximize pt's level of independence in the home and community environment.     Pt's spiritual, cultural and educational needs considered and pt agreeable to plan of care and goals.     Anticipated barriers to physical therapy: no    Goals:   Short Term Goals (STG) # weeks Goal Review Date Reviewed Date Met   Pt will demonstrate independence with initial HEP to facilitate therex progression and improvements in functional mobility 1 Ongoing 5/23/2023       Pt will increase cervical active range of motion to greater than 5 degrees for all planes to facilitate driving safety 3 Ongoing 5/23/2023       Decrease pain x 1 grade. 3 Ongoing 5/23/2023          Long Term Goals (LTG) # weeks Goal Review Date Reviewed Date Met   The patient will improve the Neck Disability Index to less than 35% Disability 6 Ongoing 5/23/2023       The patient will demonstrate increased cervical active range of motion to greater than 10 degrees for all planes in order for patient to drive safely without limitations 6 Ongoing 5/23/2023       Tolerable pain level 0-4/10. 6 Ongoing 5/23/2023       Strength of c/spine 5/5 in all planes to improve function   Ongoing 5/23/2023       Normalize posture to improve mechanics. 6 Ongoing 5/23/2023       Pt will return to previous LOF 6 Ongoing 5/23/2023         Plan     Cont per Plan of Care, posture, cervical strengthening,    Guilherme Michelle, PT

## 2023-05-30 ENCOUNTER — CLINICAL SUPPORT (OUTPATIENT)
Dept: REHABILITATION | Facility: HOSPITAL | Age: 48
End: 2023-05-30
Payer: MEDICAID

## 2023-05-30 DIAGNOSIS — M54.2 NECK PAIN: Primary | ICD-10-CM

## 2023-05-30 PROCEDURE — 97110 THERAPEUTIC EXERCISES: CPT | Mod: PN,CQ

## 2023-05-30 NOTE — PROGRESS NOTES
[facility]   Physical Therapy Treatment Note      Name: Zhang Kurtz  Clinic Number: [id number]    Therapy Diagnosis:   Encounter Diagnosis   Name Primary?    Neck pain Yes       Physician: Mariza Poon DNP    Visit Date: 5/30/2023    Physician Orders: PT Eval and Treat   Medical Diagnosis from Referral: Cervicalgia.  Carpal tunnel syndrome,bilateral.  Cubital tunnel syndrome,bilateral.  Evaluation Date: 4/25/2023  Authorization Period Expiration: 12/31/23  Plan of Care Expiration: 6/30/23  Progress Note Due: 5/25/23  Visit # / Visits authorized: 5 / 12  ([id number])  FOTO: 45/50     Precautions: Standard       Time In: 1000  Time Out: 1053  Total Billable Time: 53 minutes    Subjective     Pt reports: she needs surgery, but does not have anyone to help her after.   She was not compliant with home exercise program.  Response to previous treatment: no issues   Functional change: intermittent discomfort     Pain: 5/10  Location: bilateral neck      Objective      Objective Measures updated at progress report unless specified.     Treatment     Zhang received the treatments listed below:      therapeutic exercises to develop strength, ROM, flexibility, and posture for 38 minutes including:    UBE 5/5', level 2  OH pulley FLEX 3', ABD 3'  Chin tucks against wall with towel roll x 20  SA roll with bolster against wall x 30    Bilateral External Rotation Red Theraband x 30 NP    Cable column: 3# rows, extension x 30 each NP    Shoulder shrugs/rolls/retractions 30   ROM c/spine   LR/RR 10/10  SBL/RT 10/10     supervised modalities after being cleared for contradictions: Mechanical Traction:  Zhang received intermittent mechanical traction to the cervical spine at a force of 17/8 pounds for a total of 15 minutes. Hold time of 40s and rest time for 10s. Patient tolerated treatment well without any adverse effects.      Patient Education and Home Exercises       Education provided:   -  Posture ed.    Assessment     Zhang continues to have intermittent discomfort and poor postural endurance/awareness. Favorable response to mechanical cervical traction. Continue as tolerated to prepare for possible surgery.     Zhang Is progressing well towards her goals.   Pt prognosis is Fair/good.     Pt will continue to benefit from skilled outpatient physical therapy to address the deficits listed in the problem list box on initial evaluation, provide pt/family education and to maximize pt's level of independence in the home and community environment.     Pt's spiritual, cultural and educational needs considered and pt agreeable to plan of care and goals.     Anticipated barriers to physical therapy: no    Goals:   Short Term Goals (STG) # weeks Goal Review Date Reviewed Date Met   Pt will demonstrate independence with initial HEP to facilitate therex progression and improvements in functional mobility 1 Ongoing 5/30/2023       Pt will increase cervical active range of motion to greater than 5 degrees for all planes to facilitate driving safety 3 Ongoing 5/30/2023       Decrease pain x 1 grade. 3 Ongoing 5/30/2023          Long Term Goals (LTG) # weeks Goal Review Date Reviewed Date Met   The patient will improve the Neck Disability Index to less than 35% Disability 6 Ongoing 5/30/2023       The patient will demonstrate increased cervical active range of motion to greater than 10 degrees for all planes in order for patient to drive safely without limitations 6 Ongoing 5/30/2023       Tolerable pain level 0-4/10. 6 Ongoing 5/30/2023       Strength of c/spine 5/5 in all planes to improve function   Ongoing 5/30/2023       Normalize posture to improve mechanics. 6 Ongoing 5/30/2023       Pt will return to previous LOF 6 Ongoing 5/30/2023         Plan     Cont per Plan of Care, posture, cervical strengthening,    Aniya Toledo, PTA

## 2023-06-01 ENCOUNTER — CLINICAL SUPPORT (OUTPATIENT)
Dept: REHABILITATION | Facility: HOSPITAL | Age: 48
End: 2023-06-01
Payer: MEDICAID

## 2023-06-01 DIAGNOSIS — M54.2 NECK PAIN: Primary | ICD-10-CM

## 2023-06-01 PROCEDURE — 97110 THERAPEUTIC EXERCISES: CPT | Mod: PN

## 2023-06-01 PROCEDURE — 97012 MECHANICAL TRACTION THERAPY: CPT | Mod: PN

## 2023-06-01 NOTE — PROGRESS NOTES
[facility]   Physical Therapy Treatment Note      Name: Zhang Kurtz  Clinic Number: [id number]    Therapy Diagnosis:   Encounter Diagnosis   Name Primary?    Neck pain Yes       Physician: Mariza Poon DNP    Visit Date: 6/1/2023    Physician Orders: PT Eval and Treat   Medical Diagnosis from Referral: Cervicalgia.  Carpal tunnel syndrome,bilateral.  Cubital tunnel syndrome,bilateral.  Evaluation Date: 4/25/2023  Authorization Period Expiration: 12/31/23  Plan of Care Expiration: 6/30/23  Progress Note Due: 5/25/23  Visit # / Visits authorized: 5 / 12  ([id number])  FOTO: 45/50     Precautions: Standard       Time In: 1023 (late arrival)  Time Out: 1053  Total Billable Time: 30 minutes    Subjective     Pt reports: she needs surgery, but does not have anyone to help her after.   She was not compliant with home exercise program.  Response to previous treatment: no issues   Functional change: intermittent discomfort     Pain: 5/10  Location: bilateral neck      Objective      Objective Measures updated at progress report unless specified.     Treatment     Zhang received the treatments listed below:      therapeutic exercises to develop strength, ROM, flexibility, and posture for 15 minutes including:    UBE 5/5', level 2  OH pulley FLEX 5'  Not performed (time)  Chin tucks against wall with towel roll x 20  SA roll with bolster against wall x 30    Bilateral External Rotation Red Theraband x 30 NP    Cable column: 3# rows, extension x 30 each NP    Shoulder shrugs/rolls/retractions 30   ROM c/spine   LR/RR 10/10  SBL/RT 10/10     supervised modalities after being cleared for contradictions: Mechanical Traction:  Zhang received intermittent mechanical traction to the cervical spine at a force of 17/8 pounds for a total of 15 minutes. Hold time of 40s and rest time for 10s. Patient tolerated treatment well without any adverse effects.      Patient Education and Home Exercises        Education provided:   - Posture ed.    Assessment     Zhang continues to have intermittent discomfort and poor postural endurance/awareness. Favorable response to mechanical cervical traction. Continue as tolerated to prepare for possible surgery.     Zhang Is progressing well towards her goals.   Pt prognosis is Fair/good.     Pt will continue to benefit from skilled outpatient physical therapy to address the deficits listed in the problem list box on initial evaluation, provide pt/family education and to maximize pt's level of independence in the home and community environment.     Pt's spiritual, cultural and educational needs considered and pt agreeable to plan of care and goals.     Anticipated barriers to physical therapy: no    Goals:   Short Term Goals (STG) # weeks Goal Review Date Reviewed Date Met   Pt will demonstrate independence with initial HEP to facilitate therex progression and improvements in functional mobility 1 Ongoing 6/1/2023       Pt will increase cervical active range of motion to greater than 5 degrees for all planes to facilitate driving safety 3 Ongoing 6/1/2023       Decrease pain x 1 grade. 3 Ongoing 6/1/2023          Long Term Goals (LTG) # weeks Goal Review Date Reviewed Date Met   The patient will improve the Neck Disability Index to less than 35% Disability 6 Ongoing 6/1/2023       The patient will demonstrate increased cervical active range of motion to greater than 10 degrees for all planes in order for patient to drive safely without limitations 6 Ongoing 6/1/2023       Tolerable pain level 0-4/10. 6 Ongoing 6/1/2023       Strength of c/spine 5/5 in all planes to improve function   Ongoing 6/1/2023       Normalize posture to improve mechanics. 6 Ongoing 6/1/2023       Pt will return to previous LOF 6 Ongoing 6/1/2023         Plan     Cont per Plan of Care, posture, cervical strengthening,    Guilherme Michelle, PT

## 2023-06-06 ENCOUNTER — CLINICAL SUPPORT (OUTPATIENT)
Dept: REHABILITATION | Facility: HOSPITAL | Age: 48
End: 2023-06-06
Payer: MEDICAID

## 2023-06-06 DIAGNOSIS — M54.2 NECK PAIN: Primary | ICD-10-CM

## 2023-06-06 PROCEDURE — 97110 THERAPEUTIC EXERCISES: CPT | Mod: PN,CQ

## 2023-06-06 NOTE — PROGRESS NOTES
"[facility]   Physical Therapy Treatment Note      Name: Zhang Kurtz  Clinic Number: [id number]    Therapy Diagnosis:   Encounter Diagnosis   Name Primary?    Neck pain Yes       Physician: Mariza Poon DNP    Visit Date: 6/6/2023    Physician Orders: PT Eval and Treat   Medical Diagnosis from Referral: Cervicalgia.  Carpal tunnel syndrome,bilateral.  Cubital tunnel syndrome,bilateral.  Evaluation Date: 4/25/2023  Authorization Period Expiration: 12/31/23  Plan of Care Expiration: 6/30/23  Progress Note Due: 5/25/23  Visit # / Visits authorized: 6 / 12  ([id number])  FOTO: 45/50     Precautions: Standard       Time In: 1000  Time Out: 1055  Total Billable Time: 55 minutes    Subjective     Pt reports: "nothing really out of the ordinary."  She was not compliant with home exercise program.  Response to previous treatment: no issues   Functional change: therapy is somewhat helping the neck, but not the leg    Pain: 5/10  Location: bilateral neck      Objective      Objective Measures updated at progress report unless specified.     Treatment     Zhang received the treatments listed below:      therapeutic exercises to develop strength, ROM, flexibility, and posture for 40 minutes including:    UBE 5/5'    Bilateral External Rotation Red Theraband x 30   Cable column: 3# rows, extension x 30 each     Shoulder shrugs/rolls/retractions 30   Chin tucks 30  ROM c/spine   LR/RR 10/10  SBL/RT 10/10     supervised modalities after being cleared for contradictions: Mechanical Traction:  Zhang received intermittent mechanical traction to the cervical spine at a force of 19/10 pounds for a total of 15 minutes. Hold time of 40s and rest time for 10s. Patient tolerated treatment well without any adverse effects.      Patient Education and Home Exercises       Education provided:   - Postural awareness     Assessment     Zhang continues to have intermittent discomfort and poor postural " endurance/awareness. Patient demonstrates poor compliance with exercises needing multiple cueing for proper form throughout treatment. Favorable response to mechanical cervical traction. Continue as tolerated to prepare for possible surgery.     Zhang Is progressing well towards her goals.   Pt prognosis is Fair/good.     Pt will continue to benefit from skilled outpatient physical therapy to address the deficits listed in the problem list box on initial evaluation, provide pt/family education and to maximize pt's level of independence in the home and community environment.     Pt's spiritual, cultural and educational needs considered and pt agreeable to plan of care and goals.     Anticipated barriers to physical therapy: no    Goals:   Short Term Goals (STG) # weeks Goal Review Date Reviewed Date Met   Pt will demonstrate independence with initial HEP to facilitate therex progression and improvements in functional mobility 1 Ongoing 6/6/2023       Pt will increase cervical active range of motion to greater than 5 degrees for all planes to facilitate driving safety 3 Ongoing 6/6/2023       Decrease pain x 1 grade. 3 Ongoing 6/6/2023          Long Term Goals (LTG) # weeks Goal Review Date Reviewed Date Met   The patient will improve the Neck Disability Index to less than 35% Disability 6 Ongoing 6/6/2023       The patient will demonstrate increased cervical active range of motion to greater than 10 degrees for all planes in order for patient to drive safely without limitations 6 Ongoing 6/6/2023       Tolerable pain level 0-4/10. 6 Ongoing 6/6/2023       Strength of c/spine 5/5 in all planes to improve function   Ongoing 6/6/2023       Normalize posture to improve mechanics. 6 Ongoing 6/6/2023       Pt will return to previous LOF 6 Ongoing 6/6/2023         Plan     Cont per Plan of Care, posture, cervical strengthening,    Aniya Toledo, PTA

## 2023-11-27 ENCOUNTER — OFFICE VISIT (OUTPATIENT)
Dept: FAMILY MEDICINE | Facility: CLINIC | Age: 48
End: 2023-11-27
Payer: MEDICAID

## 2023-11-27 ENCOUNTER — TELEPHONE (OUTPATIENT)
Dept: FAMILY MEDICINE | Facility: CLINIC | Age: 48
End: 2023-11-27

## 2023-11-27 ENCOUNTER — LAB VISIT (OUTPATIENT)
Dept: LAB | Facility: HOSPITAL | Age: 48
End: 2023-11-27
Payer: MEDICAID

## 2023-11-27 VITALS
SYSTOLIC BLOOD PRESSURE: 111 MMHG | BODY MASS INDEX: 25.95 KG/M2 | HEART RATE: 74 BPM | HEIGHT: 58 IN | DIASTOLIC BLOOD PRESSURE: 71 MMHG | WEIGHT: 123.63 LBS

## 2023-11-27 DIAGNOSIS — F41.1 GENERALIZED ANXIETY DISORDER: ICD-10-CM

## 2023-11-27 DIAGNOSIS — G54.2 CERVICAL NERVE ROOT IMPINGEMENT: ICD-10-CM

## 2023-11-27 DIAGNOSIS — E55.9 VITAMIN D DEFICIENCY: ICD-10-CM

## 2023-11-27 DIAGNOSIS — K76.0 FATTY LIVER: ICD-10-CM

## 2023-11-27 DIAGNOSIS — Z13.31 POSITIVE DEPRESSION SCREENING: Primary | ICD-10-CM

## 2023-11-27 DIAGNOSIS — M47.22 OSTEOARTHRITIS OF SPINE WITH RADICULOPATHY, CERVICAL REGION: ICD-10-CM

## 2023-11-27 DIAGNOSIS — M48.061 FORAMINAL STENOSIS OF LUMBAR REGION: ICD-10-CM

## 2023-11-27 DIAGNOSIS — Z12.31 SCREENING MAMMOGRAM FOR BREAST CANCER: ICD-10-CM

## 2023-11-27 DIAGNOSIS — E55.9 VITAMIN D DEFICIENCY: Primary | ICD-10-CM

## 2023-11-27 DIAGNOSIS — E78.5 HYPERLIPIDEMIA, UNSPECIFIED HYPERLIPIDEMIA TYPE: ICD-10-CM

## 2023-11-27 DIAGNOSIS — F31.32 BIPOLAR AFFECTIVE DISORDER, CURRENTLY DEPRESSED, MODERATE: ICD-10-CM

## 2023-11-27 DIAGNOSIS — M62.830 MUSCLE SPASM OF BACK: ICD-10-CM

## 2023-11-27 PROBLEM — M51.369 DISC DEGENERATION, LUMBAR: Status: ACTIVE | Noted: 2023-11-27

## 2023-11-27 PROBLEM — F41.9 ANXIETY: Status: ACTIVE | Noted: 2023-11-27

## 2023-11-27 PROBLEM — R32 BLADDER INCONTINENCE: Status: ACTIVE | Noted: 2023-11-27

## 2023-11-27 PROBLEM — M51.36 DISC DEGENERATION, LUMBAR: Status: ACTIVE | Noted: 2023-11-27

## 2023-11-27 LAB
25(OH)D3+25(OH)D2 SERPL-MCNC: 21 NG/ML (ref 30–96)
ALBUMIN SERPL BCP-MCNC: 4.4 G/DL (ref 3.5–5.2)
ALP SERPL-CCNC: 40 U/L (ref 55–135)
ALT SERPL W/O P-5'-P-CCNC: 8 U/L (ref 10–44)
ANION GAP SERPL CALC-SCNC: 4 MMOL/L (ref 8–16)
AST SERPL-CCNC: 12 U/L (ref 10–40)
BASOPHILS # BLD AUTO: 0.04 K/UL (ref 0–0.2)
BASOPHILS NFR BLD: 0.6 % (ref 0–1.9)
BILIRUB SERPL-MCNC: 0.4 MG/DL (ref 0.1–1)
BUN SERPL-MCNC: 11 MG/DL (ref 6–20)
CALCIUM SERPL-MCNC: 9.8 MG/DL (ref 8.7–10.5)
CHLORIDE SERPL-SCNC: 111 MMOL/L (ref 95–110)
CHOLEST SERPL-MCNC: 203 MG/DL (ref 120–199)
CHOLEST/HDLC SERPL: 4 {RATIO} (ref 2–5)
CO2 SERPL-SCNC: 24 MMOL/L (ref 23–29)
CREAT SERPL-MCNC: 0.8 MG/DL (ref 0.5–1.4)
DIFFERENTIAL METHOD: ABNORMAL
EOSINOPHIL # BLD AUTO: 0.1 K/UL (ref 0–0.5)
EOSINOPHIL NFR BLD: 0.8 % (ref 0–8)
ERYTHROCYTE [DISTWIDTH] IN BLOOD BY AUTOMATED COUNT: 13.9 % (ref 11.5–14.5)
EST. GFR  (NO RACE VARIABLE): >60 ML/MIN/1.73 M^2
GLUCOSE SERPL-MCNC: 85 MG/DL (ref 70–110)
HCT VFR BLD AUTO: 40.2 % (ref 37–48.5)
HDLC SERPL-MCNC: 51 MG/DL (ref 40–75)
HDLC SERPL: 25.1 % (ref 20–50)
HGB BLD-MCNC: 14.1 G/DL (ref 12–16)
IMM GRANULOCYTES # BLD AUTO: 0.01 K/UL (ref 0–0.04)
IMM GRANULOCYTES NFR BLD AUTO: 0.2 % (ref 0–0.5)
LDLC SERPL CALC-MCNC: 137.2 MG/DL (ref 63–159)
LYMPHOCYTES # BLD AUTO: 2.7 K/UL (ref 1–4.8)
LYMPHOCYTES NFR BLD: 42.7 % (ref 18–48)
MCH RBC QN AUTO: 31.8 PG (ref 27–31)
MCHC RBC AUTO-ENTMCNC: 35.1 G/DL (ref 32–36)
MCV RBC AUTO: 91 FL (ref 82–98)
MONOCYTES # BLD AUTO: 0.5 K/UL (ref 0.3–1)
MONOCYTES NFR BLD: 7.2 % (ref 4–15)
NEUTROPHILS # BLD AUTO: 3 K/UL (ref 1.8–7.7)
NEUTROPHILS NFR BLD: 48.5 % (ref 38–73)
NONHDLC SERPL-MCNC: 152 MG/DL
NRBC BLD-RTO: 0 /100 WBC
PLATELET # BLD AUTO: 196 K/UL (ref 150–450)
PMV BLD AUTO: 10.6 FL (ref 9.2–12.9)
POTASSIUM SERPL-SCNC: 4.3 MMOL/L (ref 3.5–5.1)
PROT SERPL-MCNC: 6.8 G/DL (ref 6–8.4)
RBC # BLD AUTO: 4.44 M/UL (ref 4–5.4)
SODIUM SERPL-SCNC: 139 MMOL/L (ref 136–145)
TRIGL SERPL-MCNC: 74 MG/DL (ref 30–150)
TSH SERPL DL<=0.005 MIU/L-ACNC: 1.4 UIU/ML (ref 0.34–5.6)
WBC # BLD AUTO: 6.26 K/UL (ref 3.9–12.7)

## 2023-11-27 PROCEDURE — 80053 COMPREHEN METABOLIC PANEL: CPT

## 2023-11-27 PROCEDURE — 3078F PR MOST RECENT DIASTOLIC BLOOD PRESSURE < 80 MM HG: ICD-10-PCS | Mod: CPTII,,,

## 2023-11-27 PROCEDURE — 3074F SYST BP LT 130 MM HG: CPT | Mod: CPTII,,,

## 2023-11-27 PROCEDURE — 3044F HG A1C LEVEL LT 7.0%: CPT | Mod: CPTII,,,

## 2023-11-27 PROCEDURE — 99214 OFFICE O/P EST MOD 30 MIN: CPT

## 2023-11-27 PROCEDURE — 3008F BODY MASS INDEX DOCD: CPT | Mod: CPTII,,,

## 2023-11-27 PROCEDURE — 1160F RVW MEDS BY RX/DR IN RCRD: CPT | Mod: CPTII,,,

## 2023-11-27 PROCEDURE — 99205 OFFICE O/P NEW HI 60 MIN: CPT | Mod: S$PBB,,,

## 2023-11-27 PROCEDURE — 99205 PR OFFICE/OUTPT VISIT, NEW, LEVL V, 60-74 MIN: ICD-10-PCS | Mod: S$PBB,,,

## 2023-11-27 PROCEDURE — 1159F MED LIST DOCD IN RCRD: CPT | Mod: CPTII,,,

## 2023-11-27 PROCEDURE — 1159F PR MEDICATION LIST DOCUMENTED IN MEDICAL RECORD: ICD-10-PCS | Mod: CPTII,,,

## 2023-11-27 PROCEDURE — 80061 LIPID PANEL: CPT

## 2023-11-27 PROCEDURE — 3008F PR BODY MASS INDEX (BMI) DOCUMENTED: ICD-10-PCS | Mod: CPTII,,,

## 2023-11-27 PROCEDURE — 36415 COLL VENOUS BLD VENIPUNCTURE: CPT

## 2023-11-27 PROCEDURE — 85025 COMPLETE CBC W/AUTO DIFF WBC: CPT

## 2023-11-27 PROCEDURE — 1160F PR REVIEW ALL MEDS BY PRESCRIBER/CLIN PHARMACIST DOCUMENTED: ICD-10-PCS | Mod: CPTII,,,

## 2023-11-27 PROCEDURE — 84443 ASSAY THYROID STIM HORMONE: CPT

## 2023-11-27 PROCEDURE — 3044F PR MOST RECENT HEMOGLOBIN A1C LEVEL <7.0%: ICD-10-PCS | Mod: CPTII,,,

## 2023-11-27 PROCEDURE — 3078F DIAST BP <80 MM HG: CPT | Mod: CPTII,,,

## 2023-11-27 PROCEDURE — 3074F PR MOST RECENT SYSTOLIC BLOOD PRESSURE < 130 MM HG: ICD-10-PCS | Mod: CPTII,,,

## 2023-11-27 PROCEDURE — 82306 VITAMIN D 25 HYDROXY: CPT

## 2023-11-27 RX ORDER — MELOXICAM 15 MG/1
15 TABLET ORAL DAILY
Qty: 30 TABLET | Refills: 0 | Status: SHIPPED | OUTPATIENT
Start: 2023-11-27 | End: 2024-01-31 | Stop reason: SDUPTHER

## 2023-11-27 RX ORDER — SERTRALINE HYDROCHLORIDE 50 MG/1
100 TABLET, FILM COATED ORAL
COMMUNITY
Start: 2023-06-01 | End: 2023-11-27

## 2023-11-27 RX ORDER — METHOCARBAMOL 750 MG/1
750 TABLET, FILM COATED ORAL 3 TIMES DAILY PRN
COMMUNITY
Start: 2023-11-14 | End: 2023-11-27

## 2023-11-27 RX ORDER — ERGOCALCIFEROL 1.25 MG/1
50000 CAPSULE ORAL
Qty: 12 CAPSULE | Refills: 3 | Status: SHIPPED | OUTPATIENT
Start: 2023-11-27 | End: 2024-01-31 | Stop reason: SDUPTHER

## 2023-11-27 RX ORDER — CARIPRAZINE 1.5 MG/1
1.5 CAPSULE, GELATIN COATED ORAL DAILY
Qty: 1 CAPSULE | Refills: 0 | Status: SHIPPED | OUTPATIENT
Start: 2023-11-27 | End: 2023-11-28

## 2023-11-27 RX ORDER — CARIPRAZINE 3 MG/1
3 CAPSULE, GELATIN COATED ORAL DAILY
Qty: 30 CAPSULE | Refills: 1 | Status: SHIPPED | OUTPATIENT
Start: 2023-11-27 | End: 2023-12-27 | Stop reason: SDUPTHER

## 2023-11-27 RX ORDER — CYCLOBENZAPRINE HCL 5 MG
5 TABLET ORAL 3 TIMES DAILY PRN
Qty: 30 TABLET | Refills: 0 | Status: SHIPPED | OUTPATIENT
Start: 2023-11-27 | End: 2024-01-31 | Stop reason: SDUPTHER

## 2023-11-27 NOTE — PATIENT INSTRUCTIONS
Mental Health Specialists:    [facility]        [address], [address], [place] [address]        [phone]           [facility]       [address], [place] [address] [phone]      [facility]  [address], [place] [address]         Phone: [phone]                [facility]         [address] [place], [place] [address] [address], [place] [address] ([facility])         Phone: [phone]             [facility]         [address], [place] [address]         Phone: [phone]

## 2023-11-27 NOTE — PROGRESS NOTES
Overall labs were okay, labs do show that she has a vitamin the insufficiency I will send in a prescription for her vitamin-D, her cholesterol was slightly elevated.   -Limit: red meat, butter, fried foods, cheese, and other food with high saturated fat contents.  -Consume more: lean meats, fish, fruits, vegetables, whole grains, beans, lentils, and nuts.  -Increase fiber: oatmeal, bran, or fiber supplement.   -smoking cessation, and limiting alcohol intake

## 2023-11-27 NOTE — PROGRESS NOTES
SUBJECTIVE:      Patient ID: Zhang Kurtz is a 48 y.o. female.    Chief Complaint: Establish Care and Referral (ortho)    Zhang is a 48 yr old female, who is here today to establish care. She was a former patient of FRANK Willett. She is requesting medication refills and referral to orthopedic spine surgeon.  Patient has a past medical history of anxiety, bipolar with manic depression, cervical nerve impingement, foraminal stenosis of the lumbar region and cervical region, hyperlipidemia, fatty liver, bladder incontinence due to urgency, vitamin-D deficiency, and headaches.  Her former provider was prescribing all of her psychiatric medications, vitamin-D and gabapentin.  Patient reports she has not taken any of her medications in over a month, due to run out of most of them.  Her main concerns today are her back pain, right-sided weakness, needing a referral for her back surgeon, and refill of her medications for depression and anxiety.     Reviewed patient's chart:  Patient was seen by pain management on the 16th and receives injections in her neck, she is scheduled to follow-up in 2 days to have injections into her lower back.  She is already completed physical therapy, and reports little relief.  Unfortunately she has been told that surgery will be necessary, therefore she is needing to go back to the orthopedic surgeon.    Patient reports that she smokes 3-4 cigars a day, drinks socially 1 time a month, denies use of illicit drugs.  She states that she is unable to exercise due to fear of falling or having pain. She uses cane. She reports that she has been using a cane for the last 3 months due to an unsteady gait with some right-sided weakness.  She has a generally unhealthy diet.  She currently is not working, but she is involved with watching her grandchildren often.    DX:   Patient reports that she is been out of majority of her psych medications.  Reports that she has and taking Remeron  because she had left over, so she was out of the Seroquel.   Anxiety- Klonopin 1 mg (last filled 12/22), Zoloft 100 mg, Wellbutrin 150 mg  Bipolar manic-depressive- Zoloft 100 mg, Seroquel 50 mg   Cervical nerve impingement- gabapentin 300 mg t.i.d.   -reports having multiple bottles of this medication at home.  But has not been taking  Vitamin-D deficiency- 99987 units every 7 days -reports she is not been taking  Anorexia nervosa- Periactin 4 mg - patient requesting a refill - today's BMI is over 25.  We will not fill today we will monitor patient's weight.     Specialist:  Neurosurgery- Dr. Barclay ([facility])-   Orthopedic- RFANKY Gambino  Pain Management- Dr. Liu  Psychiatrist- has not seen 1 in years     Health maintenance due at this time are:  Lipid panel, cervical screening, mammogram, and vaccinations.    Depression  Visit Type: initial  Onset of symptoms: 1 to 6 months ago  Progression since onset: unchanged  Patient presents with the following symptoms: decreased concentration, depressed mood, excessive worry, fatigue, feelings of hopelessness, feelings of worthlessness, insomnia, irritability, muscle tension, nervousness/anxiety, panic, restlessness and shortness of breath.  Patient is not experiencing: anhedonia, chest pain, choking sensation, compulsions, confusion, dizziness, dry mouth, malaise, memory impairment, nausea, obsessions, palpitations, suicidal ideas, suicidal planning, thoughts of death and weight loss.  Frequency of symptoms: constantly   Severity: severe   Aggravated by: family issues (lossing family members, and chronic  pain)  Sleep quality: poor  Nighttime awakenings: several  Risk factors: personality disorder, family history and major life event  Patient has a history of: anxiety/panic attacks, bipolar disorder, depression and mental illness  No history of: anemia, hyperthyroidism, suicide attempt and substance abuse  Treatment tried: lifestyle changes, non-benzodiazephine  anxiolytics, benzodiazepine, medications, SSRI and non-SSRI antidepressants  Compliance with treatment: poor  Improvement on treatment: no relief  Past compliance problems: difficulty with treatment plan    Back Pain  This is a chronic problem. The current episode started more than 1 year ago. The problem occurs constantly. The problem has been gradually worsening since onset. The pain is present in the lumbar spine and sacro-iliac (cervical). The quality of the pain is described as shooting and aching. The pain radiates to the right thigh. The pain is moderate. The pain is The same all the time. The symptoms are aggravated by bending, coughing, position and twisting. Stiffness is present All day. Associated symptoms include bladder incontinence (more of urgency), headaches, leg pain, numbness (reports numbness and tingling in bilateral hands), paresthesias, tingling and weakness (patient reports right side). Pertinent negatives include no abdominal pain, bowel incontinence, chest pain, dysuria, fever, paresis, pelvic pain, perianal numbness or weight loss. Risk factors include lack of exercise and sedentary lifestyle (see PMH). She has tried NSAIDs and analgesics (steroid injections, pain management) for the symptoms. The treatment provided mild relief.       Review of patient's allergies indicates:   Allergen Reactions    Codeine Nausea Only      Past Medical History:   Diagnosis Date    Abnormal Pap smear     High grade squamous intraepithelial lesion.    Anxiety     Breast disorder     lumps removed in the past,  bilateral    Cervical nerve root impingement 11/27/2023    Depression     Dysplasia of cervix     s/p CKC    Fatty liver     noted on 1/12 [facility]    Foraminal stenosis of cervical region 12/06/2012    Foraminal stenosis of lumbar region 11/27/2023    Generalized anxiety disorder 10/17/2012    Generalized headaches     Hyperlipidemia     Increased prolactin level     Manic depressive disorder 10/17/2012     Osteoarthritis of spine with radiculopathy, cervical region 12/06/2012    Pituitary microadenoma with hyperprolactinemia     Vitamin D deficiency      Past Surgical History:   Procedure Laterality Date    BREAST BIOPSY      bening    CERVICAL CONIZATION   W/ LASER       Current Outpatient Medications   Medication Sig Dispense Refill    cariprazine (VRAYLAR) 1.5 mg Cap Take 1 capsule (1.5 mg total) by mouth once daily. Then start the 3 mg the next day. for 1 dose 1 capsule 0    cariprazine (VRAYLAR) 3 mg Cap Take 1 capsule (3 mg total) by mouth once daily. 30 capsule 1    cyclobenzaprine (FLEXERIL) 5 MG tablet Take 1 tablet (5 mg total) by mouth 3 (three) times daily as needed for Muscle spasms. 30 tablet 0    gabapentin (NEURONTIN) 300 MG capsule Take 300 mg by mouth 3 (three) times daily.      meloxicam (MOBIC) 15 MG tablet Take 1 tablet (15 mg total) by mouth once daily. 30 tablet 0    ergocalciferol (ERGOCALCIFEROL) 50,000 unit Cap Take 1 capsule (50,000 Units total) by mouth every 7 days. 12 capsule 3     No current facility-administered medications for this visit.     Family History   Problem Relation Age of Onset    Cancer Mother         lymph node CA    Asthma Daughter     Anesthesia problems Neg Hx     Clotting disorder Neg Hx       Social History     Socioeconomic History    Marital status: Single    Number of children: 2   Occupational History    Occupation: administrative coordinatory   Tobacco Use    Smoking status: Every Day     Current packs/day: 1.00     Average packs/day: 1 pack/day for 10.0 years (10.0 ttl pk-yrs)     Types: Cigars, Cigarettes    Smokeless tobacco: Never    Tobacco comments:     cigars   Substance and Sexual Activity    Alcohol use: Yes     Alcohol/week: 1.0 standard drink of alcohol     Types: 1 Standard drinks or equivalent per week    Drug use: No    Sexual activity: Yes     Partners: Male     Birth control/protection: None   Social History Narrative    ** Merged History  "Encounter **            Review of Systems   Constitutional:  Positive for activity change, irritability and unexpected weight change. Negative for chills, fatigue, fever and weight loss.   HENT:  Negative for hearing loss, rhinorrhea and trouble swallowing.    Eyes:  Negative for discharge and visual disturbance.   Respiratory:  Positive for shortness of breath. Negative for choking, chest tightness and wheezing.    Cardiovascular:  Negative for chest pain, palpitations and leg swelling.   Gastrointestinal:  Negative for abdominal pain, blood in stool, bowel incontinence, constipation, diarrhea, nausea and vomiting.   Endocrine: Negative for polydipsia and polyuria.   Genitourinary:  Positive for bladder incontinence (more of urgency) and urgency. Negative for difficulty urinating, dysuria, flank pain, frequency, hematuria, menstrual problem and pelvic pain.   Musculoskeletal:  Positive for arthralgias, back pain, gait problem, leg pain, myalgias, neck pain and neck stiffness. Negative for joint swelling.   Integumentary:  Negative for rash and wound.   Neurological:  Positive for tingling, weakness (patient reports right side), numbness (reports numbness and tingling in bilateral hands), headaches and paresthesias. Negative for dizziness, vertigo, tremors, seizures, syncope, light-headedness, memory loss and coordination difficulties.   Psychiatric/Behavioral:  Positive for agitation, decreased concentration, depression and dysphoric mood. Negative for confusion, self-injury, sleep disturbance, substance abuse and suicidal ideas. The patient is nervous/anxious and has insomnia.       OBJECTIVE:      Vitals:    11/27/23 0910   BP: 111/71   BP Location: Right arm   Patient Position: Sitting   BP Method: Medium (Automatic)   Pulse: 74   SpO2: Comment: long nails   Weight: 56.1 kg (123 lb 9.6 oz)   Height: 4' 10" (1.473 m)     Physical Exam  Vitals and nursing note reviewed.   Constitutional:       General: She is not " in acute distress.     Appearance: Normal appearance. She is well-developed. She is not ill-appearing.   HENT:      Head: Normocephalic and atraumatic.      Right Ear: Tympanic membrane, ear canal and external ear normal.      Left Ear: Tympanic membrane, ear canal and external ear normal.      Nose: Nose normal. No rhinorrhea.      Mouth/Throat:      Pharynx: Uvula midline. No posterior oropharyngeal erythema.   Eyes:      General: Lids are normal.      Conjunctiva/sclera: Conjunctivae normal.      Pupils: Pupils are equal, round, and reactive to light.      Right eye: Pupil is round and reactive.      Left eye: Pupil is round and reactive.   Neck:      Thyroid: No thyroid mass, thyromegaly or thyroid tenderness.      Vascular: No JVD.      Trachea: Trachea normal.      Meningeal: Brudzinski's sign and Kernig's sign absent.   Cardiovascular:      Rate and Rhythm: Normal rate and regular rhythm.      Pulses: Normal pulses.      Heart sounds: Normal heart sounds.   Pulmonary:      Effort: Pulmonary effort is normal. No tachypnea or respiratory distress.      Breath sounds: Normal breath sounds. No wheezing, rhonchi or rales.   Abdominal:      General: Bowel sounds are normal.      Palpations: Abdomen is soft.      Tenderness: There is no abdominal tenderness.   Musculoskeletal:      Cervical back: Neck supple. Spasms, tenderness and bony tenderness present. No swelling, deformity, erythema or crepitus. Pain with movement, spinous process tenderness and muscular tenderness present. Decreased range of motion.      Thoracic back: Normal.      Lumbar back: Spasms and tenderness present. No swelling, edema, deformity, signs of trauma or bony tenderness. Decreased range of motion. Positive right straight leg raise test (right-greater-than-left) and positive left straight leg raise test.      Right lower leg: No edema.      Left lower leg: No edema.      Comments: Patient ambulated with a cane, noted with lumbar pain and  limp while ambulating. Cervical exam shows moderate bilateral paraspinous muscle tenderness and spasm.  Cervical exam range of motion limited by pain.  Lumbar exam tenderness L3-S1 paraspinous muscles in both posterior iliac spines with spasm. Straight-leg-raising positive, right greater than left. Motor exam grossly intact to bilateral upper and lower extremities.  Bilateral hand grasps were equal, bilateral strength to lower extremities were equal.       Lymphadenopathy:      Cervical: No cervical adenopathy.   Skin:     General: Skin is warm and dry.      Findings: No rash.   Neurological:      Mental Status: She is alert and oriented to person, place, and time.      Cranial Nerves: Cranial nerves 2-12 are intact.      Sensory: No sensory deficit.      Motor: No weakness or tremor.      Coordination: Coordination normal.      Gait: Gait abnormal (noted with a right-sided limp).   Psychiatric:         Mood and Affect: Mood is anxious. Affect is labile and blunt.         Speech: Speech is delayed.         Behavior: Behavior is not agitated, slowed, aggressive, hyperactive or combative. Behavior is cooperative.         Thought Content: Thought content normal. Thought content does not include homicidal or suicidal ideation. Thought content does not include homicidal or suicidal plan.         Cognition and Memory: Cognition normal.        Assessment:       1. Osteoarthritis of spine with radiculopathy, cervical region    2. Foraminal stenosis of lumbar region    3. Cervical nerve root impingement    4. Muscle spasm of back    5. Bipolar affective disorder, currently depressed, moderate    6. Positive depression screening    7. Generalized anxiety disorder    8. Hyperlipidemia, unspecified hyperlipidemia type    9. Fatty liver    10. Vitamin D deficiency    11. Screening mammogram for breast cancer        Plan:       Osteoarthritis of spine with radiculopathy, cervical region  Discussed the importance of continuing  following up with pain management.  Referral sent for orthopedic.  -     Ambulatory referral/consult to Orthopedics; Future; Expected date: 11/28/2023  -     meloxicam (MOBIC) 15 MG tablet; Take 1 tablet (15 mg total) by mouth once daily.  Dispense: 30 tablet; Refill: 0    Foraminal stenosis of lumbar region  -     Ambulatory referral/consult to Orthopedics; Future; Expected date: 11/28/2023    Cervical nerve root impingement  Discussed not driving when taking medication or mixing medication with alcohol.  -     Ambulatory referral/consult to Orthopedics; Future; Expected date: 11/28/2023  -     cyclobenzaprine (FLEXERIL) 5 MG tablet; Take 1 tablet (5 mg total) by mouth 3 (three) times daily as needed for Muscle spasms.  Dispense: 30 tablet; Refill: 0    Muscle spasm of back  -     cyclobenzaprine (FLEXERIL) 5 MG tablet; Take 1 tablet (5 mg total) by mouth 3 (three) times daily as needed for Muscle spasms.  Dispense: 30 tablet; Refill: 0    Bipolar affective disorder, currently depressed, moderate  Patient completed the PHQ-9 questionnaire with score 23 .Patient will be started on daily antidepressant and was informed that symptom relief may take 2-4 weeks. Patient was encouraged to avoid abrupt cessation of medication and was educated on the potential medication side effects. Informed patient that previous medications were not appropriate at this time.  Discussed that she should follow-up with Psychiatry for medication management if she feels that she requires benzos.  Klonopin was last filled 12/2022. -encouraged patient to follow-up with for the [facility] for immediate treatment, patient deferred at this time.  She denies suicidal ideation, homicidal ideation, or self-harm.  Discuss new medication in detail, and how to take the medication.  An adjunctive treatment includes:  -Exercise 20-30 min daily- increases energy and wellbeing  -Obtain 7-8hr of sleep at night (avoid caffeine and watching TV late at  night)  -Participate in activities to enhance interpersonal relationship and hobbies   -Consider seeking counseling- will refer and provide resources when patient desires  -Will follow-up 4 weeks.    Advised patient to go to ER if having Suicidal or Homicidal Ideation.      -     Ambulatory referral/consult to Psychiatry; Future; Expected date: 12/04/2023  -     cariprazine (VRAYLAR) 1.5 mg Cap; Take 1 capsule (1.5 mg total) by mouth once daily. Then start the 3 mg the next day. for 1 dose  Dispense: 1 capsule; Refill: 0  -     cariprazine (VRAYLAR) 3 mg Cap; Take 1 capsule (3 mg total) by mouth once daily.  Dispense: 30 capsule; Refill: 1    Positive depression screening  Comments:  I have reviewed the positive depression score which warrants active treatment with psychotherapy and/or medications.  -patient deferred therapy at this time.     Generalized anxiety disorder  Discussed adverse reaction and contraindications of medications prescribed. Will observe closely  An adjunctive treatment includes:  -Exercise 30 min daily- increases energy and wellbeing, reducing stress  -Obtain 7-8hr of sleep at night (avoid caffeine after lunch and watching TV late at night)  -Participate in activities to enhance interpersonal relationship and hobbies   -Consider seeking counseling to work through triggers and learning copping skills- will refer and provided resources when patient desires.  -Limit or reframe from smoking, alcohol and caffeine (all factors can increase anxiety)  -Use relaxation techniques. Visualization techniques, meditation, and yoga are examples of relaxation techniques that can ease anxiety                                                                                                                                                                                                                                                                                                                                                                                                                                                                                                                                                                                                                                                                                                                                                                                                                                                                                                                                                                                                                                                                                                                                                                                                                                                                                                                                                                                                                                                                                                                                                                                                         -Will follow-up 4 weeks.      Advised patient to go to ER if having Suicidal or Homicidal Ideation.   -     Ambulatory referral/consult to Psychiatry; Future; Expected date: 12/04/2023  -     TSH; Future; Expected date: 11/27/2023    Hyperlipidemia, unspecified hyperlipidemia type  -     Lipid Panel; Future; Expected date: 11/27/2023  -     CBC Auto Differential; Future; Expected date: 11/27/2023  -     Comprehensive Metabolic Panel; Future; Expected date: 11/27/2023    Fatty liver  -     Lipid Panel; Future; Expected date: 11/27/2023  -     CBC Auto Differential; Future; Expected date: 11/27/2023  -     Comprehensive Metabolic Panel; Future; Expected date: 11/27/2023    Vitamin D deficiency  -     Vitamin D; Future;  Expected date: 11/27/2023    Screening mammogram for breast cancer  -     Mammo Digital Screening Bilat w/ Ricardo; Future; Expected date: 11/27/2023    I spent a total of 60 minutes on the day of the visit.  This includes face to face time and non-face to face time preparing to see the patient (eg, review of tests), obtaining and/or reviewing separately obtained history, documenting clinical information in the electronic or other health record, independently interpreting results and communicating results to the patient/family/caregiver, or care coordinator.     Follow up in about 1 month (around 12/27/2023) for depression and LABS.      11/27/2023 LEN Escobar, AARONP    This note was created using [employer] voice recognition software that occasionally misinterprets phrases or words.     I have reviewed the positive depression score which warrants active treatment with psychotherapy and/or medications. 23

## 2023-11-27 NOTE — TELEPHONE ENCOUNTER
----- Message from Ginny Palomino NP sent at 11/27/2023  4:33 PM CST -----  Overall labs were okay, labs do show that she has a vitamin the insufficiency I will send in a prescription for her vitamin-D, her cholesterol was slightly elevated.   -Limit: red meat, butter, fried foods, cheese, and other food with high saturated fat contents.  -Consume more: lean meats, fish, fruits, vegetables, whole grains, beans, lentils, and nuts.  -Increase fiber: oatmeal, bran, or fiber supplement.   -smoking cessation, and limiting alcohol intake

## 2023-12-11 ENCOUNTER — OFFICE VISIT (OUTPATIENT)
Dept: ORTHOPEDICS | Facility: CLINIC | Age: 48
End: 2023-12-11
Payer: MEDICAID

## 2023-12-11 VITALS — BODY MASS INDEX: 25.82 KG/M2 | WEIGHT: 123 LBS | HEIGHT: 58 IN

## 2023-12-11 DIAGNOSIS — M47.22 OSTEOARTHRITIS OF SPINE WITH RADICULOPATHY, CERVICAL REGION: ICD-10-CM

## 2023-12-11 DIAGNOSIS — M50.30 BULGING OF CERVICAL INTERVERTEBRAL DISC: ICD-10-CM

## 2023-12-11 DIAGNOSIS — M48.061 FORAMINAL STENOSIS OF LUMBAR REGION: ICD-10-CM

## 2023-12-11 DIAGNOSIS — M50.30 DISC DISEASE, DEGENERATIVE, CERVICAL: ICD-10-CM

## 2023-12-11 DIAGNOSIS — M50.30 DISC DISEASE, DEGENERATIVE, CERVICAL: Primary | ICD-10-CM

## 2023-12-11 DIAGNOSIS — M48.02 STENOSIS, CERVICAL SPINE: ICD-10-CM

## 2023-12-11 DIAGNOSIS — M48.02 FORAMINAL STENOSIS OF CERVICAL REGION: ICD-10-CM

## 2023-12-11 DIAGNOSIS — G54.2 CERVICAL NERVE ROOT IMPINGEMENT: ICD-10-CM

## 2023-12-11 DIAGNOSIS — G95.89 CERVICAL CORD MYELOMALACIA: Primary | ICD-10-CM

## 2023-12-11 DIAGNOSIS — G95.89 CERVICAL CORD MYELOMALACIA: ICD-10-CM

## 2023-12-11 PROCEDURE — 1160F PR REVIEW ALL MEDS BY PRESCRIBER/CLIN PHARMACIST DOCUMENTED: ICD-10-PCS | Mod: CPTII,S$GLB,, | Performed by: ORTHOPAEDIC SURGERY

## 2023-12-11 PROCEDURE — 3044F HG A1C LEVEL LT 7.0%: CPT | Mod: CPTII,S$GLB,, | Performed by: ORTHOPAEDIC SURGERY

## 2023-12-11 PROCEDURE — 3008F PR BODY MASS INDEX (BMI) DOCUMENTED: ICD-10-PCS | Mod: CPTII,S$GLB,, | Performed by: ORTHOPAEDIC SURGERY

## 2023-12-11 PROCEDURE — 99213 PR OFFICE/OUTPT VISIT, EST, LEVL III, 20-29 MIN: ICD-10-PCS | Mod: S$GLB,,, | Performed by: ORTHOPAEDIC SURGERY

## 2023-12-11 PROCEDURE — 1159F PR MEDICATION LIST DOCUMENTED IN MEDICAL RECORD: ICD-10-PCS | Mod: CPTII,S$GLB,, | Performed by: ORTHOPAEDIC SURGERY

## 2023-12-11 PROCEDURE — 99213 OFFICE O/P EST LOW 20 MIN: CPT | Mod: S$GLB,,, | Performed by: ORTHOPAEDIC SURGERY

## 2023-12-11 PROCEDURE — 1159F MED LIST DOCD IN RCRD: CPT | Mod: CPTII,S$GLB,, | Performed by: ORTHOPAEDIC SURGERY

## 2023-12-11 PROCEDURE — 1160F RVW MEDS BY RX/DR IN RCRD: CPT | Mod: CPTII,S$GLB,, | Performed by: ORTHOPAEDIC SURGERY

## 2023-12-11 PROCEDURE — 3044F PR MOST RECENT HEMOGLOBIN A1C LEVEL <7.0%: ICD-10-PCS | Mod: CPTII,S$GLB,, | Performed by: ORTHOPAEDIC SURGERY

## 2023-12-11 PROCEDURE — 3008F BODY MASS INDEX DOCD: CPT | Mod: CPTII,S$GLB,, | Performed by: ORTHOPAEDIC SURGERY

## 2023-12-11 NOTE — PROGRESS NOTES
"Subjective:       Patient ID: Zhang Kurtz is a 48 y.o. female.    Chief Complaint: Pain of the Neck (Cervical pain follow up. Did received injection with Dr. Liu in August which offered relief. States that her neck is doing "so/so")      History of Present Illness    Prior to meeting with the patient I reviewed the medical chart in [facility]. This included reviewing the previous progress notes from our office, review of the patient's last appointment with their primary care provider, review of any visits to the emergency room, and review of any pain management appointments or procedures.   Patient returns for follow-up she had injections in the neck and lower back with Dr. Liu the cervical injection helped her neck pain but it did not do anything for her lower back pain.  Still has involuntary movements of arms and legs does have some trouble with coordination.  She is having urinary urgency as well    Current Medications  Current Outpatient Medications   Medication Sig Dispense Refill    cariprazine (VRAYLAR) 3 mg Cap Take 1 capsule (3 mg total) by mouth once daily. (Patient not taking: Reported on 12/11/2023) 30 capsule 1    cyclobenzaprine (FLEXERIL) 5 MG tablet Take 1 tablet (5 mg total) by mouth 3 (three) times daily as needed for Muscle spasms. (Patient not taking: Reported on 12/11/2023) 30 tablet 0    ergocalciferol (ERGOCALCIFEROL) 50,000 unit Cap Take 1 capsule (50,000 Units total) by mouth every 7 days. (Patient not taking: Reported on 12/11/2023) 12 capsule 3    gabapentin (NEURONTIN) 300 MG capsule Take 300 mg by mouth 3 (three) times daily.      meloxicam (MOBIC) 15 MG tablet Take 1 tablet (15 mg total) by mouth once daily. (Patient not taking: Reported on 12/11/2023) 30 tablet 0     No current facility-administered medications for this visit.       Allergies  Review of patient's allergies indicates:   Allergen Reactions    Codeine Nausea Only       Past Medical History  Past Medical History: "   Diagnosis Date    Abnormal Pap smear     High grade squamous intraepithelial lesion.    Anxiety     Breast disorder     lumps removed in the past,  bilateral    Cervical nerve root impingement 11/27/2023    Depression     Dysplasia of cervix     s/p CKC    Fatty liver     noted on 1/12 USG    Foraminal stenosis of cervical region 12/06/2012    Foraminal stenosis of lumbar region 11/27/2023    Generalized anxiety disorder 10/17/2012    Generalized headaches     Hyperlipidemia     Increased prolactin level     Manic depressive disorder 10/17/2012    Osteoarthritis of spine with radiculopathy, cervical region 12/06/2012    Pituitary microadenoma with hyperprolactinemia     Vitamin D deficiency        Surgical History  Past Surgical History:   Procedure Laterality Date    BREAST BIOPSY      bening    CERVICAL CONIZATION   W/ LASER         Family History:   Family History   Problem Relation Age of Onset    Cancer Mother         lymph node CA    Asthma Daughter     Anesthesia problems Neg Hx     Clotting disorder Neg Hx        Social History:   Social History     Socioeconomic History    Marital status: Single    Number of children: 2   Occupational History    Occupation: administrative coordinatory   Tobacco Use    Smoking status: Every Day     Current packs/day: 1.00     Average packs/day: 1 pack/day for 10.0 years (10.0 ttl pk-yrs)     Types: Cigars, Cigarettes    Smokeless tobacco: Never    Tobacco comments:     cigars   Substance and Sexual Activity    Alcohol use: Yes     Alcohol/week: 1.0 standard drink of alcohol     Types: 1 Standard drinks or equivalent per week    Drug use: No    Sexual activity: Yes     Partners: Male     Birth control/protection: None   Social History Narrative    ** Merged History Encounter **            Hospitalization/Major Diagnostic Procedure:     Review of Systems     General/Constitutional:  Chills denies. Fatigue denies. Fever denies. Weight gain denies. Weight loss  denies.    Respiratory:  Shortness of breath denies.    Cardiovascular:  Chest pain denies.    Gastrointestinal:  Constipation denies. Diarrhea denies. Nausea denies. Vomiting denies.     Hematology:  Easy bruising denies. Prolonged bleeding denies.     Genitourinary:  Frequent urination denies. Pain in lower back denies. Painful urination denies.     Musculoskeletal:  See HPI for details    Skin:  Rash denies.    Neurologic:  Dizziness denies. Gait abnormalities denies. Seizures denies. Tingling/Numbess denies.    Psychiatric:  Anxiety denies. Depressed mood denies.     Objective:   Vital Signs: There were no vitals filed for this visit.     Physical Exam      General Examination:     Constitutional: The patient is alert and oriented to lace person and time. Mood is pleasant.     Head/Face: Normal facial features normal eyebrows    Eyes: Normal extraocular motion bilaterally    Lungs: Respirations are equal and unlabored    Gait is coordinated.    Cardiovascular: There are no swelling or varicosities present.    Lymphatic: Negative for adenopathy    Skin: Normal    Neurological: Level of consciousness normal. Oriented to place person and time and situation    Psychiatric: Oriented to time place person and situation    Patient ambulates with a cane.  Cervical exam moderate restriction of motion.  Spurling's maneuver positive on the right.  Hyperreflexia lower extremity.  No clonus.  Positive Rajni's test on the right side.  Subjective numbness C6 nerve root distribution bilaterally right greater than left.  Motor exam grade 4 5 weakness and wrist dorsiflexors on the right.  Phalen's Tinel's test negative  XRAY Report/ Interpretation:    Cervical MRI was reviewed again and I concur with the radiologist's opinion.  al Spine Without Contrast  MRI Cervical Spine Without Contrast  Order: [id number]  Status: Final result       Visible to patient: Yes (seen)       Next appt: 12/27/2023 at 10:00 AM in Family Medicine  (Ginny Palomino NP)       Dx: Cervicalgia; Hyperreflexia    0 Result Notes  Details    Reading Physician Reading Date Result Priority   Marta Peng IV, MD  [phone] 1/17/2023      Narrative & Impression  MRI of the cervical spine without contrast     HISTORY: Neck and low back pain.     Multiplanar noncontrast imaging is performed. The cervical vertebral bodies are appropriately maintained in height. There is mild degenerative anterolisthesis at C7-T1. Vertebral alignment appears otherwise satisfactory.     Moderate degrees of disc space narrowing are observed at T4-5, C5-6 and C6-7. Degenerative marrow signal changes are observed about the C5 and C6 disc spaces predominantly.     At C2-3, there is minimal central bulging of the disc margin. The central spinal canal was not significantly encroached. There are mild facet degenerative changes contributing to minor degrees of foraminal narrowing.     At C3-4, there is shallow central bulging of the disc margin which results in mild mass effect upon the thecal sac. The central spinal canal was not significantly narrowed. Facet degenerative changes contribute to mild degrees of foraminal encroachment.     At C4-5, there is shallow bulging of the disc margin contributing to mild mass effect upon the ventral margin of the thecal sac without significant central canal encroachment. There are mild facet degenerative changes resulting in mild-moderate right foraminal and mild left foraminal narrowing.     At C5-6, chronic broad-based disc bulging and posteriorly directed marginal osteophyte formation contributes to a moderate degree of asymmetric mass effect upon the thecal sac towards the right of midline. Degenerative disc changes abut and slightly deform the ventral margin of the cervical spinal cord. There is severe right foraminal and moderately severe left foraminal narrowing.     At C6-7, chronic broad-based disc bulging and posteriorly directed marginal  osteophyte formation is asymmetric towards the right of midline. This results in moderately severe central spinal canal stenosis with the disc margin impinging the cervical spinal cord. Cord signal alteration epicentered at this level is likely a reflection of myelomalacia. Facet degenerative changes contribute to severe degrees of bilateral foraminal narrowing.     At C7-T1, there is no significant disc bulging or focal soft disc herniation. There are mild facet degenerative changes contributing to mild degrees of foraminal narrowing bilaterally.     The remainder of the visualized segment of the spinal cord, craniocervical junction and paraspinal soft tissues appear unremarkable.     IMPRESSION:     Degenerative disc and facet changes as detailed above, most notably at the C5-6 and C6-7 levels. There is significant central spinal canal narrowing with impingement of the cervical spinal cord at C6-7 and associated cord signal alteration compatible with myelomalacia. There are corresponding changes of foraminal narrowing. See above details.     Electronically signed by:  Marta Peng MD  1/17/2023 10:10 AM CST Workstation: [id number]         Assessment:       1. Cervical cord myelomalacia    2. Bulging of cervical intervertebral disc    3. Disc disease, degenerative, cervical    4. Foraminal stenosis of cervical region    5. Osteoarthritis of spine with radiculopathy, cervical region    6. Foraminal stenosis of lumbar region    7. Cervical nerve root impingement    8. Stenosis, cervical spine        Plan:       Zhang was seen today for pain.    Diagnoses and all orders for this visit:    Cervical cord myelomalacia    Bulging of cervical intervertebral disc  -     X-Ray Cervical Spine AP And Lateral    Disc disease, degenerative, cervical  -     X-Ray Cervical Spine AP And Lateral    Foraminal stenosis of cervical region  -     X-Ray Cervical Spine AP And Lateral    Osteoarthritis of spine with radiculopathy,  cervical region  -     Ambulatory referral/consult to Orthopedics    Foraminal stenosis of lumbar region  -     Ambulatory referral/consult to Orthopedics    Cervical nerve root impingement  -     Ambulatory referral/consult to Orthopedics    Stenosis, cervical spine         No follow-ups on file.    Treatment options were discussed at this point in time she is really not ready to have cervical spine surgery.  She is requested and we have agreed to send her to physical therapy for the cervical spine as well as lumbar spine areas.  The natural history of cervical myelopathy disc disorder was discussed in detail.  The risk associated with the spinal condition and an worsening of the condition was discussed with the patient expressed a thorough understanding.  The patient was warned about the possible development of cauda equina syndrome and its symptoms which include but are not limited to bowel or bladder dysfunction sexual dysfunction increasing pain increasing extremity numbness or weakness and saddle anesthesia.  The patient was advised to either contact me immediately or to go to the nearest hospital emergency room if symptoms of cauda equina syndrome with to develop.  The patient expressed an understanding of these instructions.  I have explained the seriousness of her cervical myelopathy condition and that nothing will resolve or improve her symptoms but surgical intervention.  She is still hesitant to proceed with surgery we have talked about the risk of doing so in the long-term outlook.  Regarding her lumbar spine she certainly has L5-S1 disc disease but this can be treated conservatively and will add physical therapy to the lumbar spine at that time  Treatment options were discussed with regards to the nature of the medical condition. Conservative pain intervention and surgical options were discussed in detail. The probability of success of each separate treatment option was discussed. The patient expressed  a clear understanding of the treatment options. With regards to surgery, the procedure risk, benefits, complications, and outcomes were discussed. No guarantees were given with regards to surgical outcome.   The risk of complications, morbidity, and mortality of patient management decisions have been made at the time of this visit. These are associated with the patient's problems, diagnostic procedures and treatment options. This includes the possible management options selected and those considered but not selected by the patient after shared medical decision making we discussed with the patient.     This note was created using Dragon voice recognition software that occasionally misinterpreted phrases or words.

## 2023-12-21 ENCOUNTER — CLINICAL SUPPORT (OUTPATIENT)
Dept: REHABILITATION | Facility: HOSPITAL | Age: 48
End: 2023-12-21
Payer: MEDICAID

## 2023-12-21 DIAGNOSIS — M53.82 DECREASED ROM OF INTERVERTEBRAL DISCS OF CERVICAL SPINE: ICD-10-CM

## 2023-12-21 DIAGNOSIS — M48.061 FORAMINAL STENOSIS OF LUMBAR REGION: ICD-10-CM

## 2023-12-21 DIAGNOSIS — G95.89 CERVICAL CORD MYELOMALACIA: ICD-10-CM

## 2023-12-21 DIAGNOSIS — M53.86 DECREASED RANGE OF MOTION OF LUMBAR SPINE: Primary | ICD-10-CM

## 2023-12-21 DIAGNOSIS — M50.30 DISC DISEASE, DEGENERATIVE, CERVICAL: ICD-10-CM

## 2023-12-21 DIAGNOSIS — M54.2 NECK PAIN: ICD-10-CM

## 2023-12-21 PROCEDURE — 97161 PT EVAL LOW COMPLEX 20 MIN: CPT | Mod: PN

## 2023-12-21 NOTE — PLAN OF CARE
[facility]  Physical Therapy Initial Evaluation    Date: 12/21/2023   Name: Zhang Kurtz  Clinic Number: [id number]    Therapy Diagnosis:   Encounter Diagnoses   Name Primary?    Disc disease, degenerative, cervical     Foraminal stenosis of lumbar region     Cervical cord myelomalacia     Neck pain     Decreased range of motion of lumbar spine Yes    Decreased ROM of intervertebral discs of cervical spine      Physician: Tim Crowe MD    Physician Orders: PT Eval and Treat   Medical Diagnosis from Referral:   M50.30 (ICD-10-CM) - Disc disease, degenerative, cervical   M48.061 (ICD-10-CM) - Foraminal stenosis of lumbar region   G95.89 (ICD-10-CM) - Cervical cord myelomalacia   Evaluation Date: 12/21/2023  Authorization Period Expiration: 12/10/2024  Plan of Care Expiration: 1/19/2024  Visit # / Visits authorized: 1/ 1    Time In: 1:15 pm (pt late upon arrival)  Time Out: 1:50 pm  Total Appointment Time (timed & untimed codes): 35 minutes    Precautions: Standard    Subjective   Date of onset: years  History of current condition - Zhang reports: she has been having low back pain for many years. She states for today she would like to focus on her low back. She states her back pain is on the R side and at times travels down to her R foot. She describes a feeling of foot numbness at times. She states she has been using a cane recently due to fear of falling as her balance is getting worse. She states her doctor recommended surgery but she wants to try therapy first to see if she can get any benefit. She states she has been having incontinence issues as well more recently. She states her doctor is aware of this and wanted to do a cervical fusion but she wants to see if PT can make any progress. Patient does not report a specific mechanism of injury. Aggravating factors include sitting or walking. Easing factors include laying on her back.      Medical History:   Past Medical History:    Diagnosis Date    Abnormal Pap smear     High grade squamous intraepithelial lesion.    Anxiety     Breast disorder     lumps removed in the past,  bilateral    Cervical nerve root impingement 11/27/2023    Depression     Dysplasia of cervix     s/p CKC    Fatty liver     noted on 1/12 USG    Foraminal stenosis of cervical region 12/06/2012    Foraminal stenosis of lumbar region 11/27/2023    Generalized anxiety disorder 10/17/2012    Generalized headaches     Hyperlipidemia     Increased prolactin level     Manic depressive disorder 10/17/2012    Osteoarthritis of spine with radiculopathy, cervical region 12/06/2012    Pituitary microadenoma with hyperprolactinemia     Vitamin D deficiency        Surgical History:   Zhang Kurtz  has a past surgical history that includes Cervical conization w/ laser and Breast biopsy.    Medications:   Zhang has a current medication list which includes the following prescription(s): vraylar, cyclobenzaprine, ergocalciferol, gabapentin, and meloxicam.    Allergies:   Review of patient's allergies indicates:   Allergen Reactions    Codeine Nausea Only        Imaging: MRI studies: 1/6/2023: IMPRESSION:     Degenerative disc and facet changes as detailed above, most notably at the C5-6 and C6-7 levels. There is significant central spinal canal narrowing with impingement of the cervical spinal cord at C6-7 and associated cord signal alteration compatible with myelomalacia. There are corresponding changes of foraminal narrowing. See above details.     IMPRESSION:  Degenerative disc and facet changes associated with leftward curvature of the spine. There is resultant central canal and foraminal narrowing as detailed.    Prior Therapy: Y  Social History: lives with family   Occupation: none  Prior Level of Function: I  Current Level of Function: Mod I with SPC    Pain:  Current 6/10, worst 9/10, best 3/10   Location: right low back    Description: Aching  Aggravating Factors: Sitting,  Standing, Bending, and Night Time/walking  Easing Factors: rest    Pt's goals: avoid surgery    Objective     Observation: calm and cooperative     Gait:   AD - SPC  Decreased hip EXT   Decreased step length   Increased trunk sway    Posture:     Anterior pelvic tilt   Increased lumbar lordosis   Increased thoracic kyphosis     Dermatomes Right Left Comments   L2 Intact Intact     L3 Intact Intact     L4 Intact Intact     L5 Dysesthesia Intact  numbness reported along sharla   S1 Intact Intact     S2 Intact Intact                   Myotomes Right Left Comments   L2 4/5 4/5     L3 4/5 4/5     L4 5/5 5/5     L5 5/5 5/5     S1 4/5 4/5     S2 4/5 4/5        Lumbar Range of Motion:     Limitations Pain Normal   Flexion full    Y       40-50 degrees   Extension unable    Dizziness reported        20 degrees   Left Side Bending full Y       20 degrees   Right Side Bending full Y       20 degrees       Hip Passive Range of Motion:    Right  Left  Normal   Flexion full full 120 degrees    Extension full full 20 degrees    Ext. Rotation 45 45 45-60 degrees   Int. Rotation 40 40 35 degrees       Lower Extremity Strength  Right LE   Left LE                       Hip extension:  3+/5 Hip extension: 3+/5   Hip abduction:  3+/5 Hip abduction:  3+/5     Special Tests:  - Flexion preference: no  - Extension preference: no  - Quadrant test: painful B  - Logroll: (-)    DTR:   Right Left   Patellar (L3-4) 3+ 3+   Achilles (S1) 2+ 1+     Clonus: 2 beats on R and L   Schmid: (-)  Inverse supinator: (-)        Functional:    SLB:     R: 5s     L: 10s   5xSTS: 24s with UE support      Limitation/Restriction for FOTO Cervical Survey    Therapist reviewed FOTO scores for Kinzalucina Kurtz on 12/21/2023.   FOTO documents entered into [employer] - see Media section.    Score: 33%  Predicted: 43%       TREATMENT   Treatment Time In: 138pm  Treatment Time Out: 150pm  Total Treatment time (time-based codes) separate from Evaluation: 12 minutes    Zhang  received therapeutic exercises to develop strength, endurance, ROM, and posture for 12 minutes including:    Supine LTR x 15  Supine TA contraction x 10; 5s holds  Supine TA contraction+March x 10 B   Education - therapy with no relieve true cord compression / HEP / POC / speak to MD regarding any progression of symptoms    Home Exercises and Patient Education Provided    Education provided:   - Home Exercise Program    Written Home Exercises Provided: yes.  Exercises were reviewed and Zhang was able to demonstrate them prior to the end of the session.  Zhang demonstrated good  understanding of the education provided.     See EMR under Patient Instructions for exercises provided 12/21/2023.    Assessment   Zhang is a 48 y.o. female referred to outpatient Physical Therapy with a medical diagnosis of  Foraminal stenosis of lumbar region, cervical cord myelomalacia, and Foraminal stenosis of lumbar region. Pt presents with decreased and painful lumbar ROM, LE weakness, abnormal reflexes, ataxic gait,  and functional limitations of difficulty with transfers and ADL's. I spoke with Ms. Kurtz today about how therapy will not be able to relieve true compression. I encouraged her to speak with her doctor if she has any progression of symptoms. Pt is agreeable. Overall, her prognosis is guarded/poor but she is persistent in that she wants to pursue therapy.     Pt prognosis is Guarded.   Pt will benefit from skilled outpatient Physical Therapy to address the deficits stated above and in the chart below, provide pt/family education, and to maximize pt's level of independence.     Plan of care discussed with patient: Yes  Pt's spiritual, cultural and educational needs considered and patient is agreeable to the plan of care and goals as stated below:     Anticipated Barriers for therapy: MRI results/doctor surgical recommendation    Medical Necessity is demonstrated by the following  History  Co-morbidities and personal  factors that may impact the plan of care Co-morbidities:   anxiety and depression    Personal Factors:   no deficits     moderate   Examination  Body Structures and Functions, activity limitations and participation restrictions that may impact the plan of care Body Regions:   back  lower extremities  trunk    Body Systems:    gross symmetry  ROM  strength  gross coordinated movement  balance  gait  transfers    Participation Restrictions:   Work/family matters    Activity limitations:   no deficits    General Tasks and Commands  no deficits    Communication  no deficits    Mobility  lifting and carrying objects  walking  driving (bike, car, motorcycle)    Self care  dressing    Domestic Life  shopping  cooking  doing house work (cleaning house, washing dishes, laundry)  assisting others    Interactions/Relationships  family relationships    Life Areas  employment    Community and Social Life  community life  recreation and leisure         low   Clinical Presentation stable and uncomplicated low   Decision Making/ Complexity Score: low     Goals:  Short Term Goals: 4 weeks  1. Patient will be independent with HEP in order to supplement pain free lumbar ROM - PROGRESSING, NOT MET  2. Pt will demonstrate understanding of positions that limit foraminal narrowing to alleviate cord compression- PROGRESSING, NOT MET  3. Patient will report no falls with SPC - PROGRESSING, NOT MET  Plan   Plan of care Certification: 12/21/2023 to 1/19/2024.      Outpatient Physical Therapy 2 times weekly for 4 weeks to include the following interventions: Gait Training, Manual Therapy, Moist Heat/ Ice, Neuromuscular Re-ed, Patient Education, Self Care, Therapeutic Activities, and Therapeutic Exercise.     Yoel Hoyt, PT

## 2023-12-27 ENCOUNTER — OFFICE VISIT (OUTPATIENT)
Dept: FAMILY MEDICINE | Facility: CLINIC | Age: 48
End: 2023-12-27
Payer: MEDICAID

## 2023-12-27 VITALS
TEMPERATURE: 99 F | HEART RATE: 69 BPM | WEIGHT: 123.63 LBS | BODY MASS INDEX: 25.95 KG/M2 | HEIGHT: 58 IN | DIASTOLIC BLOOD PRESSURE: 90 MMHG | SYSTOLIC BLOOD PRESSURE: 128 MMHG | RESPIRATION RATE: 18 BRPM

## 2023-12-27 DIAGNOSIS — M54.2 CHRONIC CERVICAL PAIN: ICD-10-CM

## 2023-12-27 DIAGNOSIS — M48.061 FORAMINAL STENOSIS OF LUMBAR REGION: ICD-10-CM

## 2023-12-27 DIAGNOSIS — F31.32 BIPOLAR AFFECTIVE DISORDER, CURRENTLY DEPRESSED, MODERATE: Primary | ICD-10-CM

## 2023-12-27 DIAGNOSIS — G95.89 CERVICAL CORD MYELOMALACIA: ICD-10-CM

## 2023-12-27 DIAGNOSIS — G54.2 CERVICAL NERVE ROOT IMPINGEMENT: ICD-10-CM

## 2023-12-27 DIAGNOSIS — G89.29 CHRONIC CERVICAL PAIN: ICD-10-CM

## 2023-12-27 PROCEDURE — 3008F PR BODY MASS INDEX (BMI) DOCUMENTED: ICD-10-PCS | Mod: CPTII,,,

## 2023-12-27 PROCEDURE — 3044F PR MOST RECENT HEMOGLOBIN A1C LEVEL <7.0%: ICD-10-PCS | Mod: CPTII,,,

## 2023-12-27 PROCEDURE — 3080F PR MOST RECENT DIASTOLIC BLOOD PRESSURE >= 90 MM HG: ICD-10-PCS | Mod: CPTII,,,

## 2023-12-27 PROCEDURE — 99214 PR OFFICE/OUTPT VISIT, EST, LEVL IV, 30-39 MIN: ICD-10-PCS | Mod: ,,,

## 2023-12-27 PROCEDURE — 1159F PR MEDICATION LIST DOCUMENTED IN MEDICAL RECORD: ICD-10-PCS | Mod: CPTII,,,

## 2023-12-27 PROCEDURE — 99214 OFFICE O/P EST MOD 30 MIN: CPT | Mod: ,,,

## 2023-12-27 PROCEDURE — 3074F PR MOST RECENT SYSTOLIC BLOOD PRESSURE < 130 MM HG: ICD-10-PCS | Mod: CPTII,,,

## 2023-12-27 PROCEDURE — 3074F SYST BP LT 130 MM HG: CPT | Mod: CPTII,,,

## 2023-12-27 PROCEDURE — 3080F DIAST BP >= 90 MM HG: CPT | Mod: CPTII,,,

## 2023-12-27 PROCEDURE — 3008F BODY MASS INDEX DOCD: CPT | Mod: CPTII,,,

## 2023-12-27 PROCEDURE — 3044F HG A1C LEVEL LT 7.0%: CPT | Mod: CPTII,,,

## 2023-12-27 PROCEDURE — 1159F MED LIST DOCD IN RCRD: CPT | Mod: CPTII,,,

## 2023-12-27 RX ORDER — CARIPRAZINE 3 MG/1
3 CAPSULE, GELATIN COATED ORAL DAILY
Qty: 30 CAPSULE | Refills: 2 | Status: SHIPPED | OUTPATIENT
Start: 2023-12-27 | End: 2024-03-28 | Stop reason: ALTCHOICE

## 2023-12-27 NOTE — PATIENT INSTRUCTIONS
Triglyceride goal is <150. Cut back on carbs/sugars (white products, white rice, pasta, potatoes, sugar) which directly affect triglycerides and blood sugar.   LDL (bad, or lousy cholesterol) goal 100-130. In patients with Heart Diease and/or Diabetes, LDL goal is <70. Cut back on high fat food, red meat, fried, high fat dairy and trans-fats (cakes, ice cream), which effect the LDL.   HDL (good, or happy, cholesterol) goal is >40. This is impacted by exercise and dietary omega 3 fatty acid or supplements.

## 2023-12-27 NOTE — PROGRESS NOTES
"    SUBJECTIVE:      Patient ID: Zhang Kurtz is a 48 y.o. female.    Chief Complaint: Results    Zhang is a 48 yr old female, who is an established patient.  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     Review of labs:  Liver function and renal function were good.  Electrolytes within normal limits.  No anemia was present.  Thyroid function within normal limits.  Lipid panel noted with total cholesterol 203, , HDL 51.  Discuss lifestyle and dietary modifications that could help lower her total cholesterol.     Vitamin-D deficiency:  Since last visit patient's labs revealed that her vitamin-D was 21.  She was called in vitamin-D 47579 units every 7 days.  She is tolerating well with no side effects.  Explained to patient that we will re-evaluate her labs in 3-6 months.     Depression/bipolar:  Patient continues to take Vraylar as prescribed last office visit.  Tolerating well without side effects.  Reports that she has been compliant taking as prescribed.  Patient states "I am not sure how it is working," reports that she is still having depression associated with the chronic pain and the inability to work.  Denies hearing voices, seeing things, experiencing highs and lows.  Does report that she still has excessive worrying, fatigue, insomnia, irritability, and feeling of worthlessness.  Denies suicidal ideation, self-harm, or homicidal ideation.  Patient is requesting to be placed back on her Seroquel, along with her other psychiatric medications who was previously on per her other provider.  She is upset and states that she was abruptly stopped off her medications.  Discussed at last visit she disclosed " that she was off her medicines for over a month prior to coming in except for taking her Remeron intermittently due to having an excess amount. Patient was started on Vraylar and referred to a psychiatrist for med management, anxiety, depression, and bipolar.  Patient reports that she was unable to get a hold of anybody.  We will send a new referral to [facility].      Chronic back and neck pain: Last Office visit she was concern about her back pain, right-sided weakness, needing a referral for her back surgeon, and refill of her medications for depression and anxiety. Since then she went back to Dr. Crowe      Patient reports that she smokes 3-4 cigars a day, drinks socially 1 time a month, denies use of illicit drugs.  She states that she is unable to exercise due to fear of falling or having pain. She uses cane. She reports that she has been using a cane for the last 3 months due to an unsteady gait with some right-sided weakness.  She has a generally unhealthy diet.  She currently is not working, since she is unable to sit for extended periods of time. 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    Specialist:  Neurosurgery- Dr. Barclay ([facility])-   Orthopedic- FRANKY Gambino  Pain Management- Dr. Liu  Psychiatrist- has not seen 1 in years-Will refer her again to anyone    Depression  Visit Type: follow-up  Patient presents with the following symptoms: decreased concentration, depressed mood, excessive worry, fatigue, feelings of hopelessness, feelings of worthlessness, insomnia, irritability, muscle tension, nervousness/anxiety, panic, psychomotor agitation and restlessness.  Patient is not experiencing: anhedonia, chest pain, choking sensation, compulsions, confusion, dizziness, dry mouth, hypersomnia, hyperventilation, impotence, malaise, memory impairment, nausea, obsessions, palpitations, psychomotor retardation, shortness of breath, suicidal ideas, suicidal planning, thoughts of  death and weight loss.  Frequency of symptoms: most days   Severity: severe   Sleep quality: poor  Nighttime awakenings: several    Back Pain  This is a chronic problem. The current episode started more than 1 year ago. The problem occurs constantly. The problem has been gradually worsening since onset. The pain is present in the lumbar spine and sacro-iliac (cervical). The quality of the pain is described as shooting and aching. The pain radiates to the right thigh. The pain is moderate. The pain is The same all the time. The symptoms are aggravated by bending, coughing, position and twisting. Stiffness is present All day. Associated symptoms include bladder incontinence (more of urgency), leg pain, numbness (numbness and tingling in bilateral hands), paresthesias, tingling and weakness. Pertinent negatives include no abdominal pain, bowel incontinence, chest pain, dysuria, fever, headaches, paresis, pelvic pain, perianal numbness or weight loss. Risk factors include lack of exercise and sedentary lifestyle (see PMH). She has tried NSAIDs and analgesics (steroid injections, pain management) for the symptoms. The treatment provided mild relief.       Review of patient's allergies indicates:   Allergen Reactions    Codeine Nausea Only      Past Medical History:   Diagnosis Date    Abnormal Pap smear     High grade squamous intraepithelial lesion.    Anxiety     Breast disorder     lumps removed in the past,  bilateral    Cervical nerve root impingement 11/27/2023    Depression     Dysplasia of cervix     s/p CKC    Fatty liver     noted on 1/12 USG    Foraminal stenosis of cervical region 12/06/2012    Foraminal stenosis of lumbar region 11/27/2023    Generalized anxiety disorder 10/17/2012    Generalized headaches     Hyperlipidemia     Increased prolactin level     Manic depressive disorder 10/17/2012    Osteoarthritis of spine with radiculopathy, cervical region 12/06/2012    Pituitary microadenoma with  hyperprolactinemia     Vitamin D deficiency      Past Surgical History:   Procedure Laterality Date    BREAST BIOPSY      bening    CERVICAL CONIZATION   W/ LASER       Current Outpatient Medications   Medication Sig Dispense Refill    cyclobenzaprine (FLEXERIL) 5 MG tablet Take 1 tablet (5 mg total) by mouth 3 (three) times daily as needed for Muscle spasms. 30 tablet 0    ergocalciferol (ERGOCALCIFEROL) 50,000 unit Cap Take 1 capsule (50,000 Units total) by mouth every 7 days. 12 capsule 3    gabapentin (NEURONTIN) 300 MG capsule Take 300 mg by mouth 3 (three) times daily.      meloxicam (MOBIC) 15 MG tablet Take 1 tablet (15 mg total) by mouth once daily. 30 tablet 0    cariprazine (VRAYLAR) 3 mg Cap Take 1 capsule (3 mg total) by mouth once daily. 30 capsule 2     No current facility-administered medications for this visit.     Family History   Problem Relation Age of Onset    Cancer Mother         lymph node CA    Asthma Daughter     Anesthesia problems Neg Hx     Clotting disorder Neg Hx       Social History     Socioeconomic History    Marital status: Single    Number of children: 2   Occupational History    Occupation: administrative coordinatory   Tobacco Use    Smoking status: Every Day     Current packs/day: 1.00     Average packs/day: 1 pack/day for 10.0 years (10.0 ttl pk-yrs)     Types: Cigars, Cigarettes    Smokeless tobacco: Never    Tobacco comments:     cigars   Substance and Sexual Activity    Alcohol use: Yes     Alcohol/week: 1.0 standard drink of alcohol     Types: 1 Standard drinks or equivalent per week    Drug use: No    Sexual activity: Yes     Partners: Male     Birth control/protection: None   Social History Narrative    ** Merged History Encounter **          Social Determinants of Health     Financial Resource Strain: High Risk (12/27/2023)    Overall Financial Resource Strain (CARDIA)     Difficulty of Paying Living Expenses: Very hard   Food Insecurity: Food Insecurity Present  (12/27/2023)    Hunger Vital Sign     Worried About Running Out of Food in the Last Year: Often true     Ran Out of Food in the Last Year: Often true   Transportation Needs: No Transportation Needs (12/27/2023)    PRAPARE - Transportation     Lack of Transportation (Medical): No     Lack of Transportation (Non-Medical): No   Physical Activity: Inactive (12/27/2023)    Exercise Vital Sign     Days of Exercise per Week: 0 days     Minutes of Exercise per Session: 0 min   Stress: Stress Concern Present (12/27/2023)    Citizen of the [place] [employer] [employer] - Occupational Stress Questionnaire     Feeling of Stress : Very much   Social Connections: Unknown (12/27/2023)    Social Connection and Isolation Panel [NHANES]     Frequency of Communication with Friends and Family: More than three times a week     Frequency of Social Gatherings with Friends and Family: Once a week     Active Member of Clubs or Organizations: No     Attends Club or Organization Meetings: Never     Marital Status:    Housing Stability: High Risk (12/27/2023)    Housing Stability Vital Sign     Unable to Pay for Housing in the Last Year: Yes     Number of Places Lived in the Last Year: 1     Unstable Housing in the Last Year: No       Review of Systems   Constitutional:  Positive for activity change, fatigue and irritability. Negative for fever, unexpected weight change and weight loss.   HENT: Negative.  Negative for hearing loss, rhinorrhea and trouble swallowing.    Eyes:  Negative for discharge and visual disturbance.   Respiratory:  Negative for apnea, choking, chest tightness, shortness of breath and wheezing.    Cardiovascular:  Negative for chest pain, palpitations, leg swelling and claudication.   Gastrointestinal:  Negative for abdominal pain, blood in stool, bowel incontinence, diarrhea, nausea and vomiting.   Endocrine: Negative for polydipsia and polyuria.   Genitourinary:  Positive for bladder incontinence (more of urgency)  "and urgency. Negative for difficulty urinating, dysuria, flank pain, frequency, hematuria, impotence, menstrual problem and pelvic pain.   Musculoskeletal:  Positive for arthralgias, back pain, gait problem, leg pain, myalgias, neck pain and neck stiffness. Negative for joint swelling.   Integumentary:  Negative for rash and wound. Negative.   Neurological:  Positive for tingling, weakness, numbness (numbness and tingling in bilateral hands) and paresthesias. Negative for dizziness, vertigo, tremors, seizures, syncope, light-headedness, headaches, memory loss and coordination difficulties.   Psychiatric/Behavioral:  Positive for agitation, decreased concentration, depression and dysphoric mood. Negative for confusion, self-injury, sleep disturbance and suicidal ideas. The patient is nervous/anxious and has insomnia.       OBJECTIVE:      Vitals:    12/27/23 1003   BP: (!) 128/90   BP Location: Left arm   Patient Position: Sitting   BP Method: Medium (Automatic)   Pulse: 69   Resp: 18   Temp: 98.8 °F (37.1 °C)   Weight: 56.1 kg (123 lb 9.6 oz)   Height: 4' 10" (1.473 m)     Physical Exam  Vitals and nursing note reviewed.   Constitutional:       General: She is not in acute distress.     Appearance: Normal appearance. She is well-developed. She is not ill-appearing.   HENT:      Head: Normocephalic and atraumatic.      Nose: Nose normal. No rhinorrhea.      Mouth/Throat:      Pharynx: Uvula midline. No posterior oropharyngeal erythema.   Eyes:      General: Lids are normal.      Conjunctiva/sclera: Conjunctivae normal.      Pupils: Pupils are equal, round, and reactive to light.      Right eye: Pupil is round and reactive.      Left eye: Pupil is round and reactive.   Neck:      Thyroid: No thyroid mass, thyromegaly or thyroid tenderness.      Vascular: No JVD.      Trachea: Trachea normal.      Meningeal: Brudzinski's sign and Kernig's sign absent.   Cardiovascular:      Rate and Rhythm: Normal rate and regular " rhythm.      Pulses: Normal pulses.      Heart sounds: Normal heart sounds.   Pulmonary:      Effort: Pulmonary effort is normal. No tachypnea or respiratory distress.      Breath sounds: Normal breath sounds. No wheezing, rhonchi or rales.   Musculoskeletal:      Cervical back: Neck supple. No swelling, deformity, erythema or crepitus. Pain with movement present. Decreased range of motion.      Thoracic back: Normal.      Lumbar back: No swelling, edema, deformity or signs of trauma. Decreased range of motion.      Right lower leg: No edema.      Left lower leg: No edema.   Lymphadenopathy:      Cervical: No cervical adenopathy.   Skin:     General: Skin is warm and dry.      Findings: No rash.   Neurological:      Mental Status: She is alert and oriented to person, place, and time.      Cranial Nerves: Cranial nerves 2-12 are intact.      Sensory: No sensory deficit.      Motor: No weakness or tremor.      Coordination: Coordination normal.      Gait: Gait abnormal (noted with a right-sided limp).   Psychiatric:         Mood and Affect: Mood is anxious. Affect is labile and blunt.         Speech: Speech is delayed.         Behavior: Behavior is not agitated, slowed, aggressive, hyperactive or combative. Behavior is cooperative.         Thought Content: Thought content normal. Thought content does not include homicidal or suicidal ideation. Thought content does not include homicidal or suicidal plan.         Cognition and Memory: Cognition normal.        Assessment:       1. Bipolar affective disorder, currently depressed, moderate    2. Foraminal stenosis of lumbar region    3. Cervical nerve root impingement        Plan:       Osteoarthritis of spine with radiculopathy, cervical region  Discussed the importance of continuing following up with pain management.  Referral sent for orthopedic.  -     Ambulatory referral/consult to Orthopedics; Future; Expected date: 11/28/2023  -     meloxicam (MOBIC) 15 MG tablet;  Take 1 tablet (15 mg total) by mouth once daily.  Dispense: 30 tablet; Refill: 0    Foraminal stenosis of lumbar region  -     Ambulatory referral/consult to Orthopedics; Future; Expected date: 11/28/2023    Cervical nerve root impingement  Discussed not driving when taking medication or mixing medication with alcohol.  -     Ambulatory referral/consult to Orthopedics; Future; Expected date: 11/28/2023  -     cyclobenzaprine (FLEXERIL) 5 MG tablet; Take 1 tablet (5 mg total) by mouth 3 (three) times daily as needed for Muscle spasms.  Dispense: 30 tablet; Refill: 0    Muscle spasm of back  -     cyclobenzaprine (FLEXERIL) 5 MG tablet; Take 1 tablet (5 mg total) by mouth 3 (three) times daily as needed for Muscle spasms.  Dispense: 30 tablet; Refill: 0    Bipolar affective disorder, currently depressed, moderate  Patient completed the PHQ-9 questionnaire with score 23 .Patient will be started on daily antidepressant and was informed that symptom relief may take 2-4 weeks. Patient was encouraged to avoid abrupt cessation of medication and was educated on the potential medication side effects. Informed patient that previous medications were not appropriate at this time.  Discussed that she should follow-up with Psychiatry for medication management if she feels that she requires benzos.  Klonopin was last filled 12/2022. -encouraged patient to follow-up with for the [place] for immediate treatment, patient deferred at this time.  She denies suicidal ideation, homicidal ideation, or self-harm.  Discuss new medication in detail, and how to take the medication.  An adjunctive treatment includes:  -Exercise 20-30 min daily- increases energy and wellbeing  -Obtain 7-8hr of sleep at night (avoid caffeine and watching TV late at night)  -Participate in activities to enhance interpersonal relationship and hobbies   -Consider seeking counseling- will refer and provide resources when patient desires  -Will follow-up 4 weeks.     Advised patient to go to ER if having Suicidal or Homicidal Ideation.      -     Ambulatory referral/consult to Psychiatry; Future; Expected date: 12/04/2023  -     cariprazine (VRAYLAR) 1.5 mg Cap; Take 1 capsule (1.5 mg total) by mouth once daily. Then start the 3 mg the next day. for 1 dose  Dispense: 1 capsule; Refill: 0  -     cariprazine (VRAYLAR) 3 mg Cap; Take 1 capsule (3 mg total) by mouth once daily.  Dispense: 30 capsule; Refill: 1    Positive depression screening  Comments:  I have reviewed the positive depression score which warrants active treatment with psychotherapy and/or medications.  -patient deferred therapy at this time.     Generalized anxiety disorder  Discussed adverse reaction and contraindications of medications prescribed. Will observe closely  An adjunctive treatment includes:  -Exercise 30 min daily- increases energy and wellbeing, reducing stress  -Obtain 7-8hr of sleep at night (avoid caffeine after lunch and watching TV late at night)  -Participate in activities to enhance interpersonal relationship and hobbies   -Consider seeking counseling to work through triggers and learning copping skills- will refer and provided resources when patient desires.  -Limit or reframe from smoking, alcohol and caffeine (all factors can increase anxiety)  -Use relaxation techniques. Visualization techniques, meditation, and yoga are examples of relaxation techniques that can ease anxiety                                                                                                                                                                                                                                                                                                                                                                                                                                                                                                                                                                                                                                                                                                                                                                                                                                                                                                                                                                                                                                                                                                                                                                                                                                                                                                                                                                                                                                                                                                                                                                                                         -Will follow-up 4 weeks.      Advised patient to go to ER if having Suicidal or Homicidal Ideation.   -     Ambulatory referral/consult to Psychiatry; Future; Expected date: 12/04/2023  -     TSH; Future; Expected date: 11/27/2023    Hyperlipidemia, unspecified hyperlipidemia type  -     Lipid Panel; Future; Expected date: 11/27/2023  -     CBC Auto Differential; Future; Expected date: 11/27/2023  -     Comprehensive Metabolic Panel; Future; Expected date: 11/27/2023    Fatty liver  -     Lipid Panel; Future; Expected date: 11/27/2023  -     CBC Auto Differential; Future; Expected date: 11/27/2023  -     Comprehensive Metabolic Panel; Future; Expected date: 11/27/2023    Vitamin D deficiency  -     Vitamin D; Future; Expected date: 11/27/2023    Screening mammogram for breast cancer  -     Mammo Digital Screening Bilat w/ Ricardo; Future; Expected date: 11/27/2023    No LOS data to display  This includes face to face  time and non-face to face time preparing to see the patient (eg, review of tests), obtaining and/or reviewing separately obtained history, documenting clinical information in the electronic or other health record, independently interpreting results and communicating results to the patient/family/caregiver, or care coordinator.     Follow up in about 3 months (around 3/27/2024) for depression.      12/27/2023 LEN Escobar, FNP    This note was created using [employer] voice recognition software that occasionally misinterprets phrases or words.     I have reviewed the positive depression score which warrants active treatment with psychotherapy and/or medications. 23

## 2023-12-27 NOTE — PROGRESS NOTES
"    SUBJECTIVE:      Patient ID: Zhang Kurtz is a 48 y.o. female.    Chief Complaint: Results    Zhang is a 48 yr old female, who is an established patient.  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     Review of labs:  Liver function and renal function were good.  Electrolytes within normal limits.  No anemia was present.  Thyroid function within normal limits.  Lipid panel noted with total cholesterol 203, , HDL 51.  Discuss lifestyle and dietary modifications that could help lower her total cholesterol.     Vitamin-D deficiency:  Since last visit patient's labs revealed that her vitamin-D was 21.  She was called in vitamin-D 04041 units every 7 days.  She is tolerating well with no side effects.  Explained to patient that we will re-evaluate her labs in 3-6 months.     Depression/bipolar:  Patient continues to take Vraylar as prescribed last office visit.  Tolerating well without side effects.  Reports that she has been compliant taking as prescribed.  Patient states "I am not sure how it is working," reports that she is still having depression associated with the chronic pain and the inability to work.  Denies hearing voices, seeing things, experiencing highs and lows.  Does report that she still has excessive worrying, fatigue, insomnia, irritability, and feeling of worthlessness.  Denies suicidal ideation, self-harm, or homicidal ideation.  Patient is requesting to be placed back on her Seroquel, along with her other psychiatric medications who was previously on per her other provider.  She is upset and states that she was abruptly stopped off her medications.  Discussed at last visit she disclosed " that she was off her medicines for over a month prior to coming in except for taking her Remeron intermittently due to having an excess amount. Patient was started on Vraylar and referred to a psychiatrist for med management, anxiety, depression, and bipolar.  Patient reports that she was unable to get a hold of anybody.  We will send a new referral to [facility].      Chronic back and neck pain: Last Office visit she was concern about her back pain, right-sided weakness, needing a referral for her back surgeon, and refill of her medications for depression and anxiety. Since then she went back to Dr. Crowe and Dr. Liu. Dr. Crowe dx pt her with cervical cord myelopathy feels that it will not resolve or improve her symptoms but surgical intervention. He believes that her L5-S1 disc disease but this can be treated conservatively and with PT. Pt is reluctant and has deferred the Cervical surgery at this time. She is to see Dr. Liu on the 3rd for additional injections in her lumber for pain relief. She was give a Dr's name in [place] to follow up with for Pain management by Dr. Liu, but can not recall the MD. She has been prescribed gabapentin in the past and says that she has some at home that she will begin taking.     Patient reports that she smokes 3-4 cigars a day, drinks socially 1 time a month, denies use of illicit drugs.  She states that she is unable to exercise due to fear of falling or having pain. She uses cane. She reports that she has been using a cane for the last 3 months due to an unsteady gait with some right-sided weakness.  She has a generally unhealthy diet.  She currently is not working, since she is unable to sit for extended periods of time. 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    Specialist:  Neurosurgery- Dr. Barclay ([facility])-   Orthopedic- FRANKY Gambino  Pain Management- Dr. Liu  Psychiatrist- has not seen 1 in years-Will refer her again to  anyone    Depression  Visit Type: follow-up  Patient presents with the following symptoms: decreased concentration, depressed mood, excessive worry, fatigue, feelings of hopelessness, feelings of worthlessness, insomnia, irritability, muscle tension, nervousness/anxiety, panic, psychomotor agitation and restlessness.  Patient is not experiencing: anhedonia, chest pain, choking sensation, compulsions, confusion, dizziness, dry mouth, hypersomnia, hyperventilation, impotence, malaise, memory impairment, nausea, obsessions, palpitations, psychomotor retardation, shortness of breath, suicidal ideas, suicidal planning, thoughts of death and weight loss.  Frequency of symptoms: most days   Severity: severe   Sleep quality: poor  Nighttime awakenings: several    Back Pain  This is a chronic problem. The current episode started more than 1 year ago. The problem occurs constantly. The problem has been gradually worsening since onset. The pain is present in the lumbar spine and sacro-iliac (cervical). The quality of the pain is described as shooting and aching. The pain radiates to the right thigh. The pain is moderate. The pain is The same all the time. The symptoms are aggravated by bending, coughing, position and twisting. Stiffness is present All day. Associated symptoms include bladder incontinence (more of urgency), leg pain, numbness (numbness and tingling in bilateral hands), paresthesias, tingling and weakness. Pertinent negatives include no abdominal pain, bowel incontinence, chest pain, dysuria, fever, headaches, paresis, pelvic pain, perianal numbness or weight loss. Risk factors include lack of exercise and sedentary lifestyle (see PMH). She has tried NSAIDs and analgesics (steroid injections, pain management) for the symptoms. The treatment provided mild relief.           Review of patient's allergies indicates:   Allergen Reactions    Codeine Nausea Only      Past Medical History:   Diagnosis Date    Abnormal  Pap smear     High grade squamous intraepithelial lesion.    Anxiety     Breast disorder     lumps removed in the past,  bilateral    Cervical nerve root impingement 11/27/2023    Depression     Dysplasia of cervix     s/p CKC    Fatty liver     noted on 1/12 USG    Foraminal stenosis of cervical region 12/06/2012    Foraminal stenosis of lumbar region 11/27/2023    Generalized anxiety disorder 10/17/2012    Generalized headaches     Hyperlipidemia     Increased prolactin level     Manic depressive disorder 10/17/2012    Osteoarthritis of spine with radiculopathy, cervical region 12/06/2012    Pituitary microadenoma with hyperprolactinemia     Vitamin D deficiency      Past Surgical History:   Procedure Laterality Date    BREAST BIOPSY      bening    CERVICAL CONIZATION   W/ LASER       Current Outpatient Medications   Medication Sig Dispense Refill    cyclobenzaprine (FLEXERIL) 5 MG tablet Take 1 tablet (5 mg total) by mouth 3 (three) times daily as needed for Muscle spasms. 30 tablet 0    ergocalciferol (ERGOCALCIFEROL) 50,000 unit Cap Take 1 capsule (50,000 Units total) by mouth every 7 days. 12 capsule 3    gabapentin (NEURONTIN) 300 MG capsule Take 300 mg by mouth 3 (three) times daily.      meloxicam (MOBIC) 15 MG tablet Take 1 tablet (15 mg total) by mouth once daily. 30 tablet 0    cariprazine (VRAYLAR) 3 mg Cap Take 1 capsule (3 mg total) by mouth once daily. 30 capsule 2     No current facility-administered medications for this visit.     Family History   Problem Relation Age of Onset    Cancer Mother         lymph node CA    Asthma Daughter     Anesthesia problems Neg Hx     Clotting disorder Neg Hx       Social History     Socioeconomic History    Marital status: Single    Number of children: 2   Occupational History    Occupation: administrative coordinatory   Tobacco Use    Smoking status: Every Day     Current packs/day: 1.00     Average packs/day: 1 pack/day for 10.0 years (10.0 ttl pk-yrs)      Types: Cigars, Cigarettes    Smokeless tobacco: Never    Tobacco comments:     cigars   Substance and Sexual Activity    Alcohol use: Yes     Alcohol/week: 1.0 standard drink of alcohol     Types: 1 Standard drinks or equivalent per week    Drug use: No    Sexual activity: Yes     Partners: Male     Birth control/protection: None   Social History Narrative    ** Merged History Encounter **          Social Determinants of Health     Financial Resource Strain: High Risk (12/27/2023)    Overall Financial Resource Strain (CARDIA)     Difficulty of Paying Living Expenses: Very hard   Food Insecurity: Food Insecurity Present (12/27/2023)    Hunger Vital Sign     Worried About Running Out of Food in the Last Year: Often true     Ran Out of Food in the Last Year: Often true   Transportation Needs: No Transportation Needs (12/27/2023)    PRAPARE - Transportation     Lack of Transportation (Medical): No     Lack of Transportation (Non-Medical): No   Physical Activity: Inactive (12/27/2023)    Exercise Vital Sign     Days of Exercise per Week: 0 days     Minutes of Exercise per Session: 0 min   Stress: Stress Concern Present (12/27/2023)    [employer] [place] [employer] - Occupational Stress Questionnaire     Feeling of Stress : Very much   Social Connections: Unknown (12/27/2023)    Social Connection and Isolation Panel [NHANES]     Frequency of Communication with Friends and Family: More than three times a week     Frequency of Social Gatherings with Friends and Family: Once a week     Active Member of Clubs or Organizations: No     Attends Club or Organization Meetings: Never     Marital Status:    Housing Stability: High Risk (12/27/2023)    Housing Stability Vital Sign     Unable to Pay for Housing in the Last Year: Yes     Number of Places Lived in the Last Year: 1     Unstable Housing in the Last Year: No       Review of Systems   Constitutional:  Positive for activity change. Negative for chills,  "fatigue, fever and unexpected weight change.   HENT:  Negative for hearing loss, rhinorrhea and trouble swallowing.    Eyes:  Negative for discharge and visual disturbance.   Respiratory:  Negative for choking, chest tightness, shortness of breath and wheezing.    Cardiovascular:  Negative for chest pain, palpitations and leg swelling.   Gastrointestinal:  Negative for abdominal pain, blood in stool, diarrhea, nausea and vomiting.   Endocrine: Negative for polydipsia and polyuria.   Genitourinary:  Positive for bladder incontinence (more of urgency) and urgency. Negative for difficulty urinating, dysuria, flank pain, frequency, hematuria, menstrual problem and pelvic pain.   Musculoskeletal:  Positive for arthralgias, back pain, gait problem, leg pain, myalgias, neck pain and neck stiffness. Negative for joint swelling.   Integumentary:  Negative for rash and wound.   Neurological:  Positive for weakness and numbness (numbness and tingling in bilateral hands). Negative for dizziness, vertigo, tremors, seizures, syncope, light-headedness, headaches, memory loss and coordination difficulties.   Psychiatric/Behavioral:  Positive for agitation, decreased concentration and dysphoric mood. Negative for confusion, self-injury, sleep disturbance and suicidal ideas. The patient is nervous/anxious.       OBJECTIVE:      Vitals:    12/27/23 1003   BP: (!) 128/90   BP Location: Left arm   Patient Position: Sitting   BP Method: Medium (Automatic)   Pulse: 69   Resp: 18   Temp: 98.8 °F (37.1 °C)   Weight: 56.1 kg (123 lb 9.6 oz)   Height: 4' 10" (1.473 m)     Physical Exam  Vitals and nursing note reviewed.   Constitutional:       General: She is not in acute distress.     Appearance: Normal appearance. She is well-developed. She is not ill-appearing.   HENT:      Head: Normocephalic and atraumatic.      Nose: Nose normal. No rhinorrhea.      Mouth/Throat:      Pharynx: Uvula midline. No posterior oropharyngeal erythema.   Eyes: "      General: Lids are normal.      Conjunctiva/sclera: Conjunctivae normal.      Pupils: Pupils are equal, round, and reactive to light.      Right eye: Pupil is round and reactive.      Left eye: Pupil is round and reactive.   Neck:      Thyroid: No thyroid mass, thyromegaly or thyroid tenderness.      Vascular: No JVD.      Trachea: Trachea normal.      Meningeal: Brudzinski's sign and Kernig's sign absent.   Cardiovascular:      Rate and Rhythm: Normal rate and regular rhythm.      Pulses: Normal pulses.      Heart sounds: Normal heart sounds.   Pulmonary:      Effort: Pulmonary effort is normal. No tachypnea or respiratory distress.      Breath sounds: Normal breath sounds. No wheezing, rhonchi or rales.   Musculoskeletal:      Cervical back: Neck supple. Spasms present. No swelling, deformity, erythema, tenderness, bony tenderness or crepitus. Pain with movement, spinous process tenderness and muscular tenderness present. Decreased range of motion.      Thoracic back: Normal.      Lumbar back: No swelling, edema, deformity, signs of trauma or bony tenderness. Decreased range of motion.      Right lower leg: No edema.      Left lower leg: No edema.   Lymphadenopathy:      Cervical: No cervical adenopathy.   Skin:     General: Skin is warm and dry.      Findings: No rash.   Neurological:      Mental Status: She is alert and oriented to person, place, and time.      Cranial Nerves: Cranial nerves 2-12 are intact.      Sensory: No sensory deficit.      Motor: No weakness or tremor.      Coordination: Coordination normal.      Gait: Gait abnormal (noted with a right-sided limp).   Psychiatric:         Mood and Affect: Mood is anxious. Affect is labile and blunt.         Behavior: Behavior is not agitated, slowed, aggressive, hyperactive or combative. Behavior is cooperative.         Thought Content: Thought content normal. Thought content does not include homicidal or suicidal ideation. Thought content does not  include homicidal or suicidal plan.         Cognition and Memory: Cognition normal.        Assessment:       1. Bipolar affective disorder, currently depressed, moderate    2. Foraminal stenosis of lumbar region    3. Cervical nerve root impingement    4. Cervical cord myelomalacia    5. Chronic cervical pain        Plan:       Bipolar affective disorder, currently depressed, moderate  Continue taking Vraylar as prescribed.  Please follow up with Psychiatry a new referral was sent to [facility].  Deferring starting any other new psychotropic medications at this time until re-evaluated by a psychiatrist, due to history of polypharmacy.  Patient is aware to go straight to the ER if she starts to have suicidal ideation self-harm or homicidal ideation.   -     cariprazine (VRAYLAR) 3 mg Cap; Take 1 capsule (3 mg total) by mouth once daily.  Dispense: 30 capsule; Refill: 2  -     Ambulatory referral/consult to Psychiatry; Future; Expected date: 01/03/2024    Foraminal stenosis of lumbar region  PCP does not treat chronic pain.  She is under the care of an orthopedic surgeon  and Interventional pain management.  Discuss with patient about going to pain management for medication management.   -     Ambulatory referral/consult to Pain Clinic; Future; Expected date: 01/03/2024    Cervical nerve root impingement  -     Ambulatory referral/consult to Pain Clinic; Future; Expected date: 01/03/2024    Cervical cord myelomalacia    Chronic cervical pain        Follow up in about 3 months (around 3/27/2024) for depression.      12/27/2023 LEN Escobar, AARONP    This note was created using [employer] voice recognition software that occasionally misinterprets phrases or words.

## 2024-01-12 ENCOUNTER — HOSPITAL ENCOUNTER (OUTPATIENT)
Dept: RADIOLOGY | Facility: HOSPITAL | Age: 49
Discharge: HOME | End: 2024-01-12
Payer: MEDICAID

## 2024-01-12 DIAGNOSIS — Z12.31 SCREENING MAMMOGRAM FOR BREAST CANCER: ICD-10-CM

## 2024-01-12 PROCEDURE — 77067 SCR MAMMO BI INCL CAD: CPT | Mod: TC

## 2024-01-12 PROCEDURE — 77067 SCR MAMMO BI INCL CAD: CPT | Mod: 26,,, | Performed by: RADIOLOGY

## 2024-01-12 PROCEDURE — 77063 BREAST TOMOSYNTHESIS BI: CPT | Mod: 26,,, | Performed by: RADIOLOGY

## 2024-01-19 LAB
HUMAN PAPILLOMAVIRUS (HPV): NORMAL
PAP RECOMMENDATION EXT: NORMAL
PAP SMEAR: NORMAL

## 2024-01-31 ENCOUNTER — PATIENT MESSAGE (OUTPATIENT)
Dept: UROGYNECOLOGY | Facility: CLINIC | Age: 49
End: 2024-01-31
Payer: MEDICAID

## 2024-01-31 ENCOUNTER — PATIENT MESSAGE (OUTPATIENT)
Dept: FAMILY MEDICINE | Facility: CLINIC | Age: 49
End: 2024-01-31
Payer: MEDICAID

## 2024-01-31 ENCOUNTER — TELEPHONE (OUTPATIENT)
Dept: ORTHOPEDICS | Facility: CLINIC | Age: 49
End: 2024-01-31

## 2024-01-31 DIAGNOSIS — E55.9 VITAMIN D DEFICIENCY: ICD-10-CM

## 2024-01-31 DIAGNOSIS — M62.830 MUSCLE SPASM OF BACK: ICD-10-CM

## 2024-01-31 DIAGNOSIS — M54.2 CHRONIC CERVICAL PAIN: Primary | ICD-10-CM

## 2024-01-31 DIAGNOSIS — M47.22 OSTEOARTHRITIS OF SPINE WITH RADICULOPATHY, CERVICAL REGION: ICD-10-CM

## 2024-01-31 DIAGNOSIS — G54.2 CERVICAL NERVE ROOT IMPINGEMENT: ICD-10-CM

## 2024-01-31 DIAGNOSIS — G89.29 CHRONIC CERVICAL PAIN: Primary | ICD-10-CM

## 2024-01-31 RX ORDER — CYCLOBENZAPRINE HCL 5 MG
5 TABLET ORAL 3 TIMES DAILY PRN
Qty: 30 TABLET | Refills: 0 | Status: SHIPPED | OUTPATIENT
Start: 2024-01-31

## 2024-01-31 RX ORDER — MELOXICAM 15 MG/1
15 TABLET ORAL DAILY
Qty: 30 TABLET | Refills: 0 | Status: SHIPPED | OUTPATIENT
Start: 2024-01-31 | End: 2024-06-10 | Stop reason: SDUPTHER

## 2024-01-31 RX ORDER — ERGOCALCIFEROL 1.25 MG/1
50000 CAPSULE ORAL
Qty: 12 CAPSULE | Refills: 3 | Status: SHIPPED | OUTPATIENT
Start: 2024-01-31

## 2024-01-31 NOTE — TELEPHONE ENCOUNTER
Patient had self scheduled for Monday February the 5th. Attempted to advise patient that it would be too soon to follow up, and she should keep appointment scheduled for the following week. Upon hearing this patient hung up.  Attempted to contact patient again, and patient refused the call. Appointment was cancelled

## 2024-02-05 PROBLEM — N39.3 FEMALE STRESS INCONTINENCE: Status: ACTIVE | Noted: 2024-02-05

## 2024-02-09 DIAGNOSIS — R29.898 RIGHT LEG WEAKNESS: ICD-10-CM

## 2024-02-09 DIAGNOSIS — M54.41 LUMBAGO WITH SCIATICA, RIGHT SIDE: Primary | ICD-10-CM

## 2024-02-09 DIAGNOSIS — R29.2 HYPERREFLEXIA: ICD-10-CM

## 2024-02-09 DIAGNOSIS — M54.2 CERVICALGIA: Primary | ICD-10-CM

## 2024-02-09 DIAGNOSIS — N39.498 OTHER URINARY INCONTINENCE: ICD-10-CM

## 2024-02-11 ENCOUNTER — HOSPITAL ENCOUNTER (OUTPATIENT)
Dept: RADIOLOGY | Facility: HOSPITAL | Age: 49
Discharge: HOME | End: 2024-02-11
Attending: STUDENT IN AN ORGANIZED HEALTH CARE EDUCATION/TRAINING PROGRAM
Payer: MEDICAID

## 2024-02-11 DIAGNOSIS — N39.498 OTHER URINARY INCONTINENCE: ICD-10-CM

## 2024-02-11 DIAGNOSIS — M54.2 CERVICALGIA: ICD-10-CM

## 2024-02-11 DIAGNOSIS — R29.898 RIGHT LEG WEAKNESS: ICD-10-CM

## 2024-02-11 DIAGNOSIS — M54.41 LUMBAGO WITH SCIATICA, RIGHT SIDE: ICD-10-CM

## 2024-02-11 DIAGNOSIS — R29.2 HYPERREFLEXIA: ICD-10-CM

## 2024-02-11 PROCEDURE — 72148 MRI LUMBAR SPINE W/O DYE: CPT | Mod: TC

## 2024-02-11 PROCEDURE — 72141 MRI NECK SPINE W/O DYE: CPT | Mod: TC

## 2024-02-12 ENCOUNTER — OFFICE VISIT (OUTPATIENT)
Dept: ORTHOPEDICS | Facility: CLINIC | Age: 49
End: 2024-02-12
Payer: MEDICAID

## 2024-02-12 VITALS — WEIGHT: 123 LBS | HEIGHT: 58 IN | BODY MASS INDEX: 25.82 KG/M2

## 2024-02-12 DIAGNOSIS — M50.30 BULGING OF CERVICAL INTERVERTEBRAL DISC: ICD-10-CM

## 2024-02-12 DIAGNOSIS — M48.061 FORAMINAL STENOSIS OF LUMBAR REGION: ICD-10-CM

## 2024-02-12 DIAGNOSIS — M48.02 FORAMINAL STENOSIS OF CERVICAL REGION: ICD-10-CM

## 2024-02-12 DIAGNOSIS — G95.89 CERVICAL CORD MYELOMALACIA: Primary | ICD-10-CM

## 2024-02-12 DIAGNOSIS — M47.22 OSTEOARTHRITIS OF SPINE WITH RADICULOPATHY, CERVICAL REGION: ICD-10-CM

## 2024-02-12 DIAGNOSIS — M48.02 STENOSIS, CERVICAL SPINE: ICD-10-CM

## 2024-02-12 DIAGNOSIS — G54.2 CERVICAL NERVE ROOT IMPINGEMENT: ICD-10-CM

## 2024-02-12 DIAGNOSIS — M50.30 DISC DISEASE, DEGENERATIVE, CERVICAL: ICD-10-CM

## 2024-02-12 PROCEDURE — 3008F BODY MASS INDEX DOCD: CPT | Mod: CPTII,S$GLB,, | Performed by: ORTHOPAEDIC SURGERY

## 2024-02-12 PROCEDURE — 1159F MED LIST DOCD IN RCRD: CPT | Mod: CPTII,S$GLB,, | Performed by: ORTHOPAEDIC SURGERY

## 2024-02-12 PROCEDURE — 99214 OFFICE O/P EST MOD 30 MIN: CPT | Mod: S$GLB,,, | Performed by: ORTHOPAEDIC SURGERY

## 2024-02-12 PROCEDURE — 1160F RVW MEDS BY RX/DR IN RCRD: CPT | Mod: CPTII,S$GLB,, | Performed by: ORTHOPAEDIC SURGERY

## 2024-02-12 RX ORDER — TRAMADOL HYDROCHLORIDE 50 MG/1
50 TABLET ORAL EVERY 6 HOURS PRN
Qty: 28 TABLET | Refills: 2 | Status: SHIPPED | OUTPATIENT
Start: 2024-02-12 | End: 2024-06-10 | Stop reason: SDUPTHER

## 2024-02-12 NOTE — PROGRESS NOTES
Subjective:       Patient ID: Zhang Kurtz is a 48 y.o. female.    Chief Complaint: Pain of the Spine (2 mth f/u for cervical and lumbar pain recent MRIs preformed 2/9/24 Needs new referral for PT. Notes slight improvement since last visit but pain is still present.) and Pain of the Neck      History of Present Illness    Prior to meeting with the patient I reviewed the medical chart in [facility]. This included reviewing the previous progress notes from our office, review of the patient's last appointment with their primary care provider, review of any visits to the emergency room, and review of any pain management appointments or procedures.   Patient is here follow-up for neck and back pain as well as a spastic gait.  Her symptoms have not changed much from when she was last here 2 months ago.    Current Medications  Current Outpatient Medications   Medication Sig Dispense Refill    cariprazine (VRAYLAR) 3 mg Cap Take 1 capsule (3 mg total) by mouth once daily. 30 capsule 2    cyclobenzaprine (FLEXERIL) 5 MG tablet Take 1 tablet (5 mg total) by mouth 3 (three) times daily as needed for Muscle spasms. 30 tablet 0    ergocalciferol (ERGOCALCIFEROL) 50,000 unit Cap Take 1 capsule (50,000 Units total) by mouth every 7 days. 12 capsule 3    gabapentin (NEURONTIN) 300 MG capsule Take 300 mg by mouth 3 (three) times daily.      meloxicam (MOBIC) 15 MG tablet Take 1 tablet (15 mg total) by mouth once daily. 30 tablet 0    traMADoL (ULTRAM) 50 mg tablet Take 1 tablet (50 mg total) by mouth every 6 (six) hours as needed for Pain. 28 tablet 2     No current facility-administered medications for this visit.       Allergies  Review of patient's allergies indicates:   Allergen Reactions    Codeine Nausea Only       Past Medical History  Past Medical History:   Diagnosis Date    Abnormal Pap smear     High grade squamous intraepithelial lesion.    Anxiety     Breast disorder     lumps removed in the past,  bilateral    Cervical  nerve root impingement 11/27/2023    Depression     Dysplasia of cervix     s/p CKC    Fatty liver     noted on 1/12 USG    Foraminal stenosis of cervical region 12/06/2012    Foraminal stenosis of lumbar region 11/27/2023    Generalized anxiety disorder 10/17/2012    Generalized headaches     Hyperlipidemia     Increased prolactin level     Manic depressive disorder 10/17/2012    Osteoarthritis of spine with radiculopathy, cervical region 12/06/2012    Pituitary microadenoma with hyperprolactinemia     Vitamin D deficiency        Surgical History  Past Surgical History:   Procedure Laterality Date    BREAST BIOPSY      bening    CERVICAL CONIZATION   W/ LASER         Family History:   Family History   Problem Relation Age of Onset    Cancer Mother         lymph node CA    Asthma Daughter     Anesthesia problems Neg Hx     Clotting disorder Neg Hx        Social History:   Social History     Socioeconomic History    Marital status: Single    Number of children: 2   Occupational History    Occupation: administrative coordinatory   Tobacco Use    Smoking status: Every Day     Current packs/day: 1.00     Average packs/day: 1 pack/day for 10.0 years (10.0 ttl pk-yrs)     Types: Cigars, Cigarettes    Smokeless tobacco: Never    Tobacco comments:     cigars   Substance and Sexual Activity    Alcohol use: Yes     Alcohol/week: 1.0 standard drink of alcohol     Types: 1 Standard drinks or equivalent per week    Drug use: No    Sexual activity: Yes     Partners: Male     Birth control/protection: None   Social History Narrative    ** Merged History Encounter **          Social Determinants of Health     Financial Resource Strain: High Risk (12/27/2023)    Overall Financial Resource Strain (CARDIA)     Difficulty of Paying Living Expenses: Very hard   Food Insecurity: Food Insecurity Present (12/27/2023)    Hunger Vital Sign     Worried About Running Out of Food in the Last Year: Often true     Ran Out of Food in the Last  Year: Often true   Transportation Needs: No Transportation Needs (12/27/2023)    PRAPARE - Transportation     Lack of Transportation (Medical): No     Lack of Transportation (Non-Medical): No   Physical Activity: Inactive (12/27/2023)    Exercise Vital Sign     Days of Exercise per Week: 0 days     Minutes of Exercise per Session: 0 min   Stress: Stress Concern Present (12/27/2023)    [employer] - Occupational Stress Questionnaire     Feeling of Stress : Very much   Social Connections: Unknown (12/27/2023)    Social Connection and Isolation Panel [NHANES]     Frequency of Communication with Friends and Family: More than three times a week     Frequency of Social Gatherings with Friends and Family: Once a week     Active Member of Clubs or Organizations: No     Attends Club or Organization Meetings: Never     Marital Status:    Housing Stability: High Risk (12/27/2023)    Housing Stability Vital Sign     Unable to Pay for Housing in the Last Year: Yes     Number of Places Lived in the Last Year: 1     Unstable Housing in the Last Year: No       Hospitalization/Major Diagnostic Procedure:     Review of Systems     General/Constitutional:  Chills denies. Fatigue denies. Fever denies. Weight gain denies. Weight loss denies.    Respiratory:  Shortness of breath denies.    Cardiovascular:  Chest pain denies.    Gastrointestinal:  Constipation denies. Diarrhea denies. Nausea denies. Vomiting denies.     Hematology:  Easy bruising denies. Prolonged bleeding denies.     Genitourinary:  Frequent urination denies. Pain in lower back denies. Painful urination denies.     Musculoskeletal:  See HPI for details    Skin:  Rash denies.    Neurologic:  Dizziness denies. Gait abnormalities denies. Seizures denies. Tingling/Numbess denies.    Psychiatric:  Anxiety denies. Depressed mood denies.     Objective:   Vital Signs: There were no vitals filed for this visit.     Physical Exam      General  Examination:     Constitutional: The patient is alert and oriented to lace person and time. Mood is pleasant.     Head/Face: Normal facial features normal eyebrows    Eyes: Normal extraocular motion bilaterally    Lungs: Respirations are equal and unlabored    Gait is coordinated.    Cardiovascular: There are no swelling or varicosities present.    Lymphatic: Negative for adenopathy    Skin: Normal    Neurological: Level of consciousness normal. Oriented to place person and time and situation    Psychiatric: Oriented to time place person and situation    Lower extremity exam demonstrates significant clonus bilaterally.  Hyperreflexia at 4 bilaterally.  Spastic gait noted.  Bilateral lower extremities distal neurovascular intact.  Lumbar range of motion diminished in all planes mobility.  Upper extremities with no focal neurologic weakness but positive Schmid's bilaterally.    XRAY Report/ Interpretation:Lumbar MRI reviewed the patient the office today demonstrates L5-S1 degenerative disc disease with a left paracentral disc herniation causing bilateral foraminal stenosis.  Unchanged from MRI done about a year ago.    Cervical MRI reviewed the patient office today demonstrates significant degenerative disc disease C5-6 and C6-7 causing severe central spinal stenosis.  The area of myelomalacia within the spinal cord is slightly increased compared to the MRI about a year ago.      Assessment:       1. Cervical cord myelomalacia    2. Bulging of cervical intervertebral disc    3. Disc disease, degenerative, cervical    4. Foraminal stenosis of cervical region    5. Osteoarthritis of spine with radiculopathy, cervical region    6. Foraminal stenosis of lumbar region    7. Cervical nerve root impingement    8. Stenosis, cervical spine        Plan:       Zhang was seen today for pain and pain.    Diagnoses and all orders for this visit:    Cervical cord myelomalacia  -     traMADoL (ULTRAM) 50 mg tablet; Take 1 tablet  (50 mg total) by mouth every 6 (six) hours as needed for Pain.    Bulging of cervical intervertebral disc  -     traMADoL (ULTRAM) 50 mg tablet; Take 1 tablet (50 mg total) by mouth every 6 (six) hours as needed for Pain.    Disc disease, degenerative, cervical  -     traMADoL (ULTRAM) 50 mg tablet; Take 1 tablet (50 mg total) by mouth every 6 (six) hours as needed for Pain.    Foraminal stenosis of cervical region  -     Ambulatory referral/consult to Physical/Occupational Therapy; Future  -     traMADoL (ULTRAM) 50 mg tablet; Take 1 tablet (50 mg total) by mouth every 6 (six) hours as needed for Pain.    Osteoarthritis of spine with radiculopathy, cervical region  -     Ambulatory referral/consult to Physical/Occupational Therapy; Future  -     traMADoL (ULTRAM) 50 mg tablet; Take 1 tablet (50 mg total) by mouth every 6 (six) hours as needed for Pain.    Foraminal stenosis of lumbar region  -     Ambulatory referral/consult to Physical/Occupational Therapy; Future  -     traMADoL (ULTRAM) 50 mg tablet; Take 1 tablet (50 mg total) by mouth every 6 (six) hours as needed for Pain.    Cervical nerve root impingement  -     traMADoL (ULTRAM) 50 mg tablet; Take 1 tablet (50 mg total) by mouth every 6 (six) hours as needed for Pain.    Stenosis, cervical spine  -     traMADoL (ULTRAM) 50 mg tablet; Take 1 tablet (50 mg total) by mouth every 6 (six) hours as needed for Pain.         Follow up for 8 week f/u - cervical pain.    We strongly recommend that the patient consider having C5-6 and C6-7 ACDF.  We explained to the patient that her issues with her gait will not improve unless she has surgery; although this is not a guarantee that her symptoms will improve.  We do not think that pain management or physical therapy will make any significant difference in her ability to ambulate at this time.    Treatment options were discussed with regards to the nature of the medical condition. Conservative pain intervention and  surgical options were discussed in detail. The probability of success of each separate treatment option was discussed. The patient expressed a clear understanding of the treatment options. With regards to surgery, the procedure risk, benefits, complications, and outcomes were discussed. No guarantees were given with regards to surgical outcome.   The risk of complications, morbidity, and mortality of patient management decisions have been made at the time of this visit. These are associated with the patient's problems, diagnostic procedures and treatment options. This includes the possible management options selected and those considered but not selected by the patient after shared medical decision making we discussed with the patient.     This note was created using Dragon voice recognition software that occasionally misinterpreted phrases or words.

## 2024-02-12 NOTE — PROGRESS NOTES
Subjective:       Patient ID: Zhang Kurtz is a 48 y.o. female.    Chief Complaint: Pain of the Spine (2 mth f/u for cervical and lumbar pain recent MRIs preformed 2/9/24 Needs new referral for PT. Notes slight improvement since last visit but pain is still present.) and Pain of the Neck      History of Present Illness    Prior to meeting with the patient I reviewed the medical chart in [facility]. This included reviewing the previous progress notes from our office, review of the patient's last appointment with their primary care provider, review of any visits to the emergency room, and review of any pain management appointments or procedures.   Patient is here follow-up for neck and back pain as well as a spastic gait. Her symptoms have not changed much from when she was last here 2 months ago.     Current Medications  Current Outpatient Medications   Medication Sig Dispense Refill    cariprazine (VRAYLAR) 3 mg Cap Take 1 capsule (3 mg total) by mouth once daily. 30 capsule 2    cyclobenzaprine (FLEXERIL) 5 MG tablet Take 1 tablet (5 mg total) by mouth 3 (three) times daily as needed for Muscle spasms. 30 tablet 0    ergocalciferol (ERGOCALCIFEROL) 50,000 unit Cap Take 1 capsule (50,000 Units total) by mouth every 7 days. 12 capsule 3    gabapentin (NEURONTIN) 300 MG capsule Take 300 mg by mouth 3 (three) times daily.      meloxicam (MOBIC) 15 MG tablet Take 1 tablet (15 mg total) by mouth once daily. 30 tablet 0    traMADoL (ULTRAM) 50 mg tablet Take 1 tablet (50 mg total) by mouth every 6 (six) hours as needed for Pain. 28 tablet 2     No current facility-administered medications for this visit.       Allergies  Review of patient's allergies indicates:   Allergen Reactions    Codeine Nausea Only       Past Medical History  Past Medical History:   Diagnosis Date    Abnormal Pap smear     High grade squamous intraepithelial lesion.    Anxiety     Breast disorder     lumps removed in the past,  bilateral    Cervical  nerve root impingement 11/27/2023    Depression     Dysplasia of cervix     s/p CKC    Fatty liver     noted on 1/12 USG    Foraminal stenosis of cervical region 12/06/2012    Foraminal stenosis of lumbar region 11/27/2023    Generalized anxiety disorder 10/17/2012    Generalized headaches     Hyperlipidemia     Increased prolactin level     Manic depressive disorder 10/17/2012    Osteoarthritis of spine with radiculopathy, cervical region 12/06/2012    Pituitary microadenoma with hyperprolactinemia     Vitamin D deficiency        Surgical History  Past Surgical History:   Procedure Laterality Date    BREAST BIOPSY      bening    CERVICAL CONIZATION   W/ LASER         Family History:   Family History   Problem Relation Age of Onset    Cancer Mother         lymph node CA    Asthma Daughter     Anesthesia problems Neg Hx     Clotting disorder Neg Hx        Social History:   Social History     Socioeconomic History    Marital status: Single    Number of children: 2   Occupational History    Occupation: administrative coordinatory   Tobacco Use    Smoking status: Every Day     Current packs/day: 1.00     Average packs/day: 1 pack/day for 10.0 years (10.0 ttl pk-yrs)     Types: Cigars, Cigarettes    Smokeless tobacco: Never    Tobacco comments:     cigars   Substance and Sexual Activity    Alcohol use: Yes     Alcohol/week: 1.0 standard drink of alcohol     Types: 1 Standard drinks or equivalent per week    Drug use: No    Sexual activity: Yes     Partners: Male     Birth control/protection: None   Social History Narrative    ** Merged History Encounter **          Social Determinants of Health     Financial Resource Strain: High Risk (12/27/2023)    Overall Financial Resource Strain (CARDIA)     Difficulty of Paying Living Expenses: Very hard   Food Insecurity: Food Insecurity Present (12/27/2023)    Hunger Vital Sign     Worried About Running Out of Food in the Last Year: Often true     Ran Out of Food in the Last  Year: Often true   Transportation Needs: No Transportation Needs (12/27/2023)    PRAPARE - Transportation     Lack of Transportation (Medical): No     Lack of Transportation (Non-Medical): No   Physical Activity: Inactive (12/27/2023)    Exercise Vital Sign     Days of Exercise per Week: 0 days     Minutes of Exercise per Session: 0 min   Stress: Stress Concern Present (12/27/2023)    [employer] - Occupational Stress Questionnaire     Feeling of Stress : Very much   Social Connections: Unknown (12/27/2023)    Social Connection and Isolation Panel [NHANES]     Frequency of Communication with Friends and Family: More than three times a week     Frequency of Social Gatherings with Friends and Family: Once a week     Active Member of Clubs or Organizations: No     Attends Club or Organization Meetings: Never     Marital Status:    Housing Stability: High Risk (12/27/2023)    Housing Stability Vital Sign     Unable to Pay for Housing in the Last Year: Yes     Number of Places Lived in the Last Year: 1     Unstable Housing in the Last Year: No       Hospitalization/Major Diagnostic Procedure:     Review of Systems     General/Constitutional:  Chills denies. Fatigue denies. Fever denies. Weight gain denies. Weight loss denies.    Respiratory:  Shortness of breath denies.    Cardiovascular:  Chest pain denies.    Gastrointestinal:  Constipation denies. Diarrhea denies. Nausea denies. Vomiting denies.     Hematology:  Easy bruising denies. Prolonged bleeding denies.     Genitourinary:  Frequent urination denies. Pain in lower back denies. Painful urination denies.     Musculoskeletal:  See HPI for details    Skin:  Rash denies.    Neurologic:  Dizziness denies. Gait abnormalities denies. Seizures denies. Tingling/Numbess denies.    Psychiatric:  Anxiety denies. Depressed mood denies.     Objective:   Vital Signs: There were no vitals filed for this visit.     Physical Exam      General  Examination:     Constitutional: The patient is alert and oriented to lace person and time. Mood is pleasant.     Head/Face: Normal facial features normal eyebrows    Eyes: Normal extraocular motion bilaterally    Lungs: Respirations are equal and unlabored    Gait is coordinated.    Cardiovascular: There are no swelling or varicosities present.    Lymphatic: Negative for adenopathy    Skin: Normal    Neurological: Level of consciousness normal. Oriented to place person and time and situation    Psychiatric: Oriented to time place person and situation    Lower extremity exam demonstrates significant clonus bilaterally. Hyperreflexia at 4 bilaterally. Spastic gait noted. Bilateral lower extremities distal neurovascular intact. Lumbar range of motion diminished in all planes mobility. Upper extremities with no focal neurologic weakness but positive Schmid's bilaterally.     XRAY Report/ Interpretation:Lumbar MRI reviewed the patient the office today demonstrates L5-S1 degenerative disc disease with a left paracentral disc herniation causing bilateral foraminal stenosis.  Unchanged from MRI done about a year ago.     Cervical MRI reviewed the patient office today demonstrates significant degenerative disc disease C5-6 and C6-7 causing severe central spinal stenosis.  The area of myelomalacia within the spinal cord is slightly increased compared to the MRI about a year ago.      Assessment:       1. Cervical cord myelomalacia    2. Bulging of cervical intervertebral disc    3. Disc disease, degenerative, cervical    4. Foraminal stenosis of cervical region    5. Osteoarthritis of spine with radiculopathy, cervical region    6. Foraminal stenosis of lumbar region    7. Cervical nerve root impingement    8. Stenosis, cervical spine        Plan:       Zhang was seen today for pain and pain.    Diagnoses and all orders for this visit:    Cervical cord myelomalacia  -     traMADoL (ULTRAM) 50 mg tablet; Take 1 tablet (50  mg total) by mouth every 6 (six) hours as needed for Pain.    Bulging of cervical intervertebral disc  -     traMADoL (ULTRAM) 50 mg tablet; Take 1 tablet (50 mg total) by mouth every 6 (six) hours as needed for Pain.    Disc disease, degenerative, cervical  -     traMADoL (ULTRAM) 50 mg tablet; Take 1 tablet (50 mg total) by mouth every 6 (six) hours as needed for Pain.    Foraminal stenosis of cervical region  -     Ambulatory referral/consult to Physical/Occupational Therapy; Future  -     traMADoL (ULTRAM) 50 mg tablet; Take 1 tablet (50 mg total) by mouth every 6 (six) hours as needed for Pain.    Osteoarthritis of spine with radiculopathy, cervical region  -     Ambulatory referral/consult to Physical/Occupational Therapy; Future  -     traMADoL (ULTRAM) 50 mg tablet; Take 1 tablet (50 mg total) by mouth every 6 (six) hours as needed for Pain.    Foraminal stenosis of lumbar region  -     Ambulatory referral/consult to Physical/Occupational Therapy; Future  -     traMADoL (ULTRAM) 50 mg tablet; Take 1 tablet (50 mg total) by mouth every 6 (six) hours as needed for Pain.    Cervical nerve root impingement  -     traMADoL (ULTRAM) 50 mg tablet; Take 1 tablet (50 mg total) by mouth every 6 (six) hours as needed for Pain.    Stenosis, cervical spine  -     traMADoL (ULTRAM) 50 mg tablet; Take 1 tablet (50 mg total) by mouth every 6 (six) hours as needed for Pain.         Follow up for 8 week f/u - cervical pain.     We strongly recommend that the patient consider having C5-6 and C6-7 ACDF.  We explained to the patient that her issues with her gait will not improve unless she has surgery; although this is not a guarantee that her symptoms will improve.  We do not think that pain management or physical therapy will make any significant difference in her ability to ambulate at this time.     Treatment options were discussed with regards to the nature of the medical condition. Conservative pain intervention and  surgical options were discussed in detail. The probability of success of each separate treatment option was discussed. The patient expressed a clear understanding of the treatment options. With regards to surgery, the procedure risk, benefits, complications, and outcomes were discussed. No guarantees were given with regards to surgical outcome.   The risk of complications, morbidity, and mortality of patient management decisions have been made at the time of this visit. These are associated with the patient's problems, diagnostic procedures and treatment options. This includes the possible management options selected and those considered but not selected by the patient after shared medical decision making we discussed with the patient.      This note was created using Dragon voice recognition software that occasionally misinterpreted phrases or words.

## 2024-02-21 ENCOUNTER — OFFICE VISIT (OUTPATIENT)
Dept: UROGYNECOLOGY | Facility: CLINIC | Age: 49
End: 2024-02-21
Payer: MEDICAID

## 2024-02-21 ENCOUNTER — CLINICAL SUPPORT (OUTPATIENT)
Dept: REHABILITATION | Facility: HOSPITAL | Age: 49
End: 2024-02-21
Payer: MEDICAID

## 2024-02-21 VITALS — HEIGHT: 58 IN | BODY MASS INDEX: 25.82 KG/M2 | WEIGHT: 123 LBS

## 2024-02-21 DIAGNOSIS — M47.22 OSTEOARTHRITIS OF SPINE WITH RADICULOPATHY, CERVICAL REGION: Primary | ICD-10-CM

## 2024-02-21 DIAGNOSIS — M53.82 DECREASED ROM OF INTERVERTEBRAL DISCS OF CERVICAL SPINE: ICD-10-CM

## 2024-02-21 DIAGNOSIS — N39.41 URGE INCONTINENCE OF URINE: ICD-10-CM

## 2024-02-21 DIAGNOSIS — M48.061 FORAMINAL STENOSIS OF LUMBAR REGION: ICD-10-CM

## 2024-02-21 DIAGNOSIS — M53.86 DECREASED RANGE OF MOTION OF LUMBAR SPINE: ICD-10-CM

## 2024-02-21 DIAGNOSIS — R35.0 URINARY FREQUENCY: Primary | ICD-10-CM

## 2024-02-21 DIAGNOSIS — R39.14 FEELING OF INCOMPLETE BLADDER EMPTYING: ICD-10-CM

## 2024-02-21 DIAGNOSIS — M48.02 FORAMINAL STENOSIS OF CERVICAL REGION: ICD-10-CM

## 2024-02-21 LAB
BILIRUBIN, UA POC OHS: NEGATIVE
BLOOD, UA POC OHS: NEGATIVE
CLARITY, UA POC OHS: CLEAR
COLOR, UA POC OHS: YELLOW
GLUCOSE, UA POC OHS: NEGATIVE
KETONES, UA POC OHS: NEGATIVE
LEUKOCYTES, UA POC OHS: NEGATIVE
NITRITE, UA POC OHS: NEGATIVE
PH, UA POC OHS: 6
POC RESIDUAL URINE VOLUME: 109 ML (ref 0–100)
PROTEIN, UA POC OHS: NEGATIVE
SPECIFIC GRAVITY, UA POC OHS: 1.02
UROBILINOGEN, UA POC OHS: 2

## 2024-02-21 PROCEDURE — 81003 URINALYSIS AUTO W/O SCOPE: CPT | Mod: PBBFAC,PO | Performed by: NURSE PRACTITIONER

## 2024-02-21 PROCEDURE — 97164 PT RE-EVAL EST PLAN CARE: CPT | Mod: PO

## 2024-02-21 PROCEDURE — 99999PBSHW PR PBB SHADOW TECHNICAL ONLY FILED TO HB: Mod: PBBFAC,,,

## 2024-02-21 PROCEDURE — 3008F BODY MASS INDEX DOCD: CPT | Mod: CPTII,,, | Performed by: NURSE PRACTITIONER

## 2024-02-21 PROCEDURE — 51798 US URINE CAPACITY MEASURE: CPT | Mod: PBBFAC,PO | Performed by: NURSE PRACTITIONER

## 2024-02-21 PROCEDURE — 99214 OFFICE O/P EST MOD 30 MIN: CPT | Mod: S$PBB,,, | Performed by: NURSE PRACTITIONER

## 2024-02-21 PROCEDURE — 99213 OFFICE O/P EST LOW 20 MIN: CPT | Mod: PBBFAC,PO,25 | Performed by: NURSE PRACTITIONER

## 2024-02-21 PROCEDURE — 99999PBSHW POCT BLADDER SCAN: Mod: PBBFAC,,,

## 2024-02-21 PROCEDURE — 1159F MED LIST DOCD IN RCRD: CPT | Mod: CPTII,,, | Performed by: NURSE PRACTITIONER

## 2024-02-21 PROCEDURE — 99999PBSHW POCT URINALYSIS(INSTRUMENT): Mod: PBBFAC,,,

## 2024-02-21 PROCEDURE — 99999 PR PBB SHADOW E&M-EST. PATIENT-LVL III: CPT | Mod: PBBFAC,,, | Performed by: NURSE PRACTITIONER

## 2024-02-21 PROCEDURE — 1160F RVW MEDS BY RX/DR IN RCRD: CPT | Mod: CPTII,,, | Performed by: NURSE PRACTITIONER

## 2024-02-21 NOTE — PROGRESS NOTES
Subjective:       Patient ID: Zhang Kurtz is a 48 y.o. female.    Chief Complaint: Urinary Frequency      Zhang Kurtz is a 48 y.o. female.  Who presents today with urinary complaints/concerns.  She was last seen in our office approximately 2 years ago on 02/15/2022.  At that time she was started on mirabegron.  She did not feel that it worked for her whatsoever.  She has not been back to the office as she had other medical concerns primarily with her back.  However, she was getting frustrated with her urinary complaints and is wanting to see what can be done.  She is frequency about every hour to 2 during the day.  She does sleep through the night.  She denies any enuresis.  However as soon she gets up in the morning she will sometimes leak on the way to the bathroom.  She has positive UUI.  She was not routinely wearing any pads but does state that she has to change her clothes completely a few times a day.  She does occasionally have some STELLA.  She has some PVF.  She drinks 2-3 sodas a day and this is her primary fluid intake.  She does drink some rare tea and some rare alcohol.  She knows she needs to increase her water intake.  For the past few days she has been trying to drink a little bit more water.  Yet she finds that she has to go to the bathroom more frequently with the increase in the water intake.  She has been doing pelvic floor physical therapy at [facility] in [place].  She has been about 3 sessions and feels like it might be helping a little bit.  She was concerned that her back might be contributing to some of her symptoms.  She was also wondering if she has some sort of prolapse that is contributing to her symptoms.  She denies any vaginal discharge bleeding or bulging sensation.  She is very anxious to see what can be done.    Review of Systems   Constitutional:  Negative for activity change, fever and unexpected weight change.   HENT:  Negative for hearing loss.     Eyes:  Negative for visual disturbance.   Respiratory:  Negative for shortness of breath and wheezing.    Cardiovascular:  Negative for chest pain, palpitations and leg swelling.   Gastrointestinal:  Negative for abdominal pain, constipation and diarrhea.   Genitourinary:  Positive for frequency and urgency. Negative for dyspareunia, dysuria, vaginal bleeding and vaginal discharge.   Musculoskeletal:  Positive for back pain. Negative for gait problem and neck pain.   Skin:  Negative for rash and wound.   Allergic/Immunologic: Negative for immunocompromised state.   Neurological:  Negative for tremors, speech difficulty and weakness.   Hematological:  Does not bruise/bleed easily.   Psychiatric/Behavioral:  Negative for agitation and confusion.        Objective:      Physical Exam  Vitals reviewed. Exam conducted with a chaperone present.   Constitutional:       General: She is not in acute distress.     Appearance: She is well-developed.   HENT:      Head: Normocephalic and atraumatic.   Neck:      Thyroid: No thyromegaly.   Pulmonary:      Effort: Pulmonary effort is normal. No respiratory distress.   Abdominal:      Palpations: Abdomen is soft.      Tenderness: There is no abdominal tenderness.      Hernia: No hernia is present.   Musculoskeletal:         General: Normal range of motion.      Cervical back: Normal range of motion.      Thoracic back: Tenderness present.      Lumbar back: Tenderness present.      Comments: Ambulates with cane   Skin:     General: Skin is warm and dry.      Findings: No rash.   Neurological:      Mental Status: She is alert and oriented to person, place, and time.   Psychiatric:         Mood and Affect: Mood normal.         Behavior: Behavior normal.         Thought Content: Thought content normal.       Pelvic Exam:  V: No lesions. No palpable nodes.   Va:  No discharge or bleeding.  Good length and support.  Meatus:No caruncle or stenosis  Urethra: Non tender. No suburethral  masses.  Cx/Cuff: Normal   Uterus:  Small nontender  Ad: No mass or tenderness.  Levators :Symmetrical. Normal tone. Non tender.  BL: Non tender  RV: No hemorrhoids.      Assessment:       1. Urinary frequency    2. Urge incontinence of urine    3. Feeling of incomplete bladder emptying        Plan:       Urinary frequency bladder scan today revealed at its highest 109 mL of residual urine.  Patient had voided approximately 40 minutes prior to scan.  Patient will try gemtesa  -     POCT Urinalysis(Instrument)  -     POCT Bladder Scan    Urge incontinence of urine patient will try gemtesa 1 tablet p.o. daily.  Patient will continue with pelvic floor physical therapy.    Feeling of incomplete bladder emptying NP reviewed with patient that good water intake will also help with the feeling of incomplete bladder emptying.  Patient encouraged to drink at least 2 8 oz bottles of water a day to start with.  Patient verbalized understanding and will attempt to integrate this into her diet.    RTC 6 weeks

## 2024-02-21 NOTE — PLAN OF CARE
[facility]  Physical Therapy Re-Evaluation     Name: Zhang Kurtz  [facility] Number: [id number]    Therapy Diagnosis:   Encounter Diagnoses   Name Primary?    Foraminal stenosis of cervical region     Osteoarthritis of spine with radiculopathy, cervical region Yes    Foraminal stenosis of lumbar region     Decreased range of motion of lumbar spine     Decreased ROM of intervertebral discs of cervical spine      Physician: Rian Jane PA    Physician Orders: PT Eval and Treat   Medical Diagnosis from Referral:   M48.02 (ICD-10-CM) - Foraminal stenosis of cervical region   M47.22 (ICD-10-CM) - Osteoarthritis of spine with radiculopathy, cervical region   M48.061 (ICD-10-CM) - Foraminal stenosis of lumbar region     Evaluation Date: 2/21/2024  Authorization Period Expiration: 12/30/24  Plan of Care Expiration: 4/10/24    Visit # / Visits authorized: 1/ 1  FOTO: 37/100    Precautions: Standard and Fall    Time In: 1130  Time Out: 1215  Total Billable Time: 45 minutes    Subjective      Date of onset: 2/12/24   (referral)    History of current condition - Zhang reports: receiving therapy 3 months ago and had to stop after receiving epidural to both neck and low back. Had more soreness and cancelled  PT consecutive visit. Referred back for continuous therapy. C/o poor balance from weakness of the leg with low back and neck pains.  History reports no trauma or mechanism of injury. Onset of HA's and progressing to this condition since 2009     Prior Therapy: yes. This is resuming treatment.  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  Current Level of Function: antalgic spastic gait, apprehensive with balance. Ambulates with straight cane.    Pain:  Current 5/10, worst 9/10, best 3/10   Location: low back down  posterior thigh L>R  Description: aching dull.  Aggravating Factors:  "Sitting, Standing, Flexing, and Lifting  Easing Factors: rest    Patient's goals: " to get stronger in the legs and improve balance and walking".    Medical History:   Past Medical History:   Diagnosis Date    Abnormal Pap smear     High grade squamous intraepithelial lesion.    Anxiety     Breast disorder     lumps removed in the past,  bilateral    Cervical nerve root impingement 11/27/2023    Depression     Dysplasia of cervix     s/p CKC    Fatty liver     noted on 1/12 USG    Foraminal stenosis of cervical region 12/06/2012    Foraminal stenosis of lumbar region 11/27/2023    Generalized anxiety disorder 10/17/2012    Generalized headaches     Hyperlipidemia     Increased prolactin level     Manic depressive disorder 10/17/2012    Osteoarthritis of spine with radiculopathy, cervical region 12/06/2012    Pituitary microadenoma with hyperprolactinemia     Vitamin D deficiency        Surgical History:   Zhang Kurtz  has a past surgical history that includes Cervical conization w/ laser and Breast biopsy.    Medications:   Zhang has a current medication list which includes the following prescription(s): vraylar, cyclobenzaprine, ergocalciferol, gabapentin, meloxicam, and tramadol.    Allergies:   Review of patient's allergies indicates:   Allergen Reactions    Codeine Nausea Only        Objective      Posture: left hip/shoulder lower; knee recurvatum  Palpation: no tenderness, no ankle sweling  Sensation: reports of paresthesia to posterior thigh  Range of Motion/Strength: BUE's WFL; NECK ROM 90%   horacic/Lumbar  ROM Pain/Dysfunction with Movement:   Flexion    50    Extension     15    Right side bending     30    Left side bending    30    Right rotation     45    Left rotation     45      Flexibility: SLR 80 deg bilat  Gait: With AD.  Device Used -  Cane  Analysis: antalgic gait with accelerated left swing and decreased knee flexion L>R  Bed Mobility:Independent  Transfers: Independent  Special Tests: SLR " (-), CHUCKY (-)    Intake Outcome Measure for FOTO neck Survey    Therapist reviewed FOTO scores for Zhang Kurtz on 2/21/2024.   FOTO report - see Media section or FOTO account episode details.    Intake Score: 37%       Treatment     Total Treatment time separate from Evaluation: 15 minutes    Zhang received the treatments listed below:      therapeutic exercises to develop strength, flexibility, core stabilization, and establish home exercise program for 15 minutes including:  Heel raises  Mini squats   Posterior peliv til  Knee to chest: single/double    Patient Education and Home Exercises     Education provided:   - Discussed New Plan of care; Home Exercise Instructions    Written Home Exercises Provided: yes.  Exercises were reviewed and Zhang was able to demonstrate them prior to the end of the session.  Zhang demonstrated good  understanding of the education provided.     See EMR under Patient Instructions for exercises provided 2/21/2024.    Assessment     Zhang is a 48 y.o. female referred to outpatient Physical Therapy with a medical diagnosis of foraminal stenosis to cervical and lumbar spine, OA with radiculopathy. Patient presents with more complaints to leg and low back versus the neck. Perceived weakness to the lower extremities with apprehension and decreased balance strategies.  Antlagic spastic gait abnormality, dependence on assistive device for ambulation and decreased overall mobility.    Patient prognosis is Good.   Patient will benefit from skilled outpatient Physical Therapy to address the deficits stated above and in the chart below, provide patient /family education, and to maximize patient's level of independence.     Plan of care discussed with patient: Yes  Patient's spiritual, cultural and educational needs considered and patient is agreeable to the plan of care and goals as stated below:     Anticipated Barriers for therapy: none    Medical Necessity is demonstrated by the  following  History  Co-morbidities and personal factors that may impact the plan of care [x] LOW: no personal factors / co-morbidities  [] MODERATE: 1-2 personal factors / co-morbidities  [] HIGH: 3+ personal factors / co-morbidities    Moderate / High Support Documentation:   Co-morbidities affecting plan of care: osteoarthritis    Personal Factors:   anxiety     Examination  Body Structures and Functions, activity limitations and participation restrictions that may impact the plan of care [x] LOW: addressing 1-2 elements  [] MODERATE: 3+ elements  [] HIGH: 4+ elements (please support below)    Moderate / High Support Documentation: no deficits     Clinical Presentation [x] LOW: stable  [] MODERATE: Evolving  [] HIGH: Unstable     Decision Making/ Complexity Score: low       Goals:  Short Term Goals: 2 weeks   Patient will report compliance to home exercises given.  Patient will tolerate 10 minutes cardio exercuises on Nustep.  Patient will demonstrate erect  posture during standing Exercises.    Long Term Goals: 6 weeks   Patient will have pain-free mobility 0/10 90% of the time.  Patient will ambulate independently without use of Assistive device.  Patient will improve FOTO scores 56/100 or better.    Plan     Plan of care Certification: 2/21/2024 to 4/10/24.    Outpatient Physical Therapy 2 times weekly for 6 weeks to include the following interventions: Cervical/Lumbar Traction, Electrical Stimulation  , Gait Training, Manual Therapy, Neuromuscular Re-ed, Therapeutic Activities, and Therapeutic Exercise.     Nomi Casiano PT        Physician's Signature: _________________________________________ Date: ________________

## 2024-02-21 NOTE — PATIENT INSTRUCTIONS
Pelvic Tilt        Lie on back, legs bent. Exhale, tilting top of pelvis back, pubic bone up, to flatten lower back. Inhale, rolling pelvis opposite way, top forward, pubic bone down, arch in back.  Repeat ____ times. Do ____ sessions per day.     https://pm.[employer].us/5     Copyright © [employer]â„[employer]. All rights reserved.      Knee to Chest        Lying supine, bend involved knee to chest ___ times. Repeat with other leg.  Do ___ times per day.    Copyright © [employer]. All rights reserved.      Double Knee to Chest (Flexion)        Gently pull both knees toward chest. Feel stretch in lower back or buttock area. Breathing deeply, Hold ____ seconds.  Repeat ____ times. Do ____ sessions per day.     https://[employer] minutes.[employer]Local/228     Copyright © [employer]. All rights reserved.      Bridging        Lying supine with knees bent, tighten stomach muscles. Raise hips off floor, tighten buttocks. Hold ___ seconds.  Repeat ___ times. Do ___ times per day.    Copyright © [employer]. All rights reserved.      Heel Raises        Stand with support. Tighten pelvic floor and hold. With knees straight, raise heels off ground. Hold ___ seconds. Relax for ___ seconds.  Repeat ___ times. Do ___ times a day.    Copyright © [employer]. All rights reserved.      Mini-Squats (Standing)        Stand with support. Bend knees slightly. Tighten pelvic floor. Hold for ___ seconds. Return to straight standing. Relax for ___ seconds.  Repeat ___ times. Do ___ times a day.

## 2024-02-28 ENCOUNTER — CLINICAL SUPPORT (OUTPATIENT)
Dept: REHABILITATION | Facility: HOSPITAL | Age: 49
End: 2024-02-28
Payer: MEDICAID

## 2024-02-28 DIAGNOSIS — M53.82 DECREASED ROM OF INTERVERTEBRAL DISCS OF CERVICAL SPINE: ICD-10-CM

## 2024-02-28 DIAGNOSIS — M53.86 DECREASED RANGE OF MOTION OF LUMBAR SPINE: Primary | ICD-10-CM

## 2024-02-28 PROCEDURE — 97110 THERAPEUTIC EXERCISES: CPT | Mod: PO

## 2024-02-28 NOTE — PROGRESS NOTES
[facility]   Physical Therapy Treatment Note      Name: Zhang Kurtz  Clinic Number: [id number]    Therapy Diagnosis:   Encounter Diagnoses   Name Primary?    Decreased range of motion of lumbar spine Yes    Decreased ROM of intervertebral discs of cervical spine      Physician: Rian Jane PA    Visit Date: 2/28/2024  Physician Orders: PT Eval and Treat   Medical Diagnosis from Referral:   M48.02 (ICD-10-CM) - Foraminal stenosis of cervical region   M47.22 (ICD-10-CM) - Osteoarthritis of spine with radiculopathy, cervical region   M48.061 (ICD-10-CM) - Foraminal stenosis of lumbar region      Evaluation Date: 2/21/2024  Authorization Period Expiration: 12/30/24  Plan of Care Expiration: 4/10/24     Visit # / Visits authorized: 1/ 4    (3)     Precautions: Standard and Fall     Time In: 1431  Time Out: 1215  Total Billable Time: 44 minutes  Subjective     Patient reports: more concerns to low back pain..  She was compliant with home exercise program.  Response to previous treatment: on initial visit.  Functional change: none    Pain: 7/10  Location: low back      Objective    Presents to department on cane.  + antalgic gait.    Treatment     Zhang received the treatments listed below:      therapeutic exercises to develop strength, endurance, core strengthening and posture for 45 minutes including:  Nustep all 4's L2 10'  Supine Gastroc/hamstring stretches  Knee to chest single 10/10; double 10  Piriformis stretches 30 second x 3 R/L  Posterior pelvic tilt 20  Straight leg raise/abdominal bracing 15/15  Trunk rotation using theraball 20/20  Reverse sit up using theraball 20  Short arc quads manual resist 20/20      Patient Education and Home Exercises       Education provided:   - Reiterated improtance of following with home exercises.    Written Home Exercises Provided: Patient instructed to cont prior HEP. Exercises were reviewed and Zhang was able to demonstrate them prior  to the end of the session.  Zhang demonstrated good  understanding of the education provided. See Electronic Medical Record under Patient Instructions for exercises provided during therapy sessions    Assessment     Patient has no protective guarding or rigid end feel on stretching Exercises. No aggravated discomforts with resistive strengthening.  Tolerate Rx well.    Zhang Is progressing well towards her goals.   Patient prognosis is Good.     Patient will continue to benefit from skilled outpatient physical therapy to address the deficits listed in the problem list box on initial evaluation, provide pt/family education and to maximize pt's level of independence in the home and community environment.     Patient's spiritual, cultural and educational needs considered and pt agreeable to plan of care and goals.     Anticipated barriers to physical therapy: none    Goals:   Short Term Goals:   Patient will report compliance to home exercises given.  (Ongoing)  Patient will tolerate 10 minutes cardio exercuises on Nustep.  (Ongoing)  Patient will demonstrate erect  posture during standing Exercises. (Ongoing)     Long Term Goals:    Patient will have pain-free mobility 0/10 90% of the time. (Ongoing)  Patient will ambulate independently without use of Assistive device. (Ongoing)  Patient will improve FOTO scores 56/100 or better. (Ongoing)      Plan     Core/leg strengthening  Back Stretches  Continue Plan of care.    Nomi Casiano, PT

## 2024-03-05 ENCOUNTER — CLINICAL SUPPORT (OUTPATIENT)
Dept: REHABILITATION | Facility: HOSPITAL | Age: 49
End: 2024-03-05
Payer: MEDICAID

## 2024-03-05 DIAGNOSIS — M53.86 DECREASED RANGE OF MOTION OF LUMBAR SPINE: Primary | ICD-10-CM

## 2024-03-05 DIAGNOSIS — M53.82 DECREASED ROM OF INTERVERTEBRAL DISCS OF CERVICAL SPINE: ICD-10-CM

## 2024-03-05 PROCEDURE — 97110 THERAPEUTIC EXERCISES: CPT | Mod: PO

## 2024-03-05 NOTE — PATIENT INSTRUCTIONS
Shoulder Shrug        Raise shoulders up, then slowly press them down. Relax.  Repeat ____ times. Do ____ sessions per day.     https://[employer]ENDIA/841     Copyright © [employer]. All rights reserved.      Scapular Retraction: Elbow Flexion (Standing)        With elbows bent to 90°, pinch shoulder blades together and rotate arms out, keeping elbows bent.  Repeat ____ times per set. Do ____ sets per session. Do ____ sessions per day.     https://[employer]ENDIA/949     Copyright © [employer]. All rights reserved.      Stretch Break - Chin Tuck        Looking straight forward, tuck chin and hold ____ seconds. Relax and return to starting position.  Repeat ____ times every ____ hours.

## 2024-03-05 NOTE — PROGRESS NOTES
"[facility]   Physical Therapy Treatment Note      Name: Zhang Kurtz  Clinic Number: [id number]    Therapy Diagnosis:   Encounter Diagnoses   Name Primary?    Decreased range of motion of lumbar spine Yes    Decreased ROM of intervertebral discs of cervical spine      Physician: Rian Jane PA    Visit Date: 3/5/2024  Physician Orders: PT Eval and Treat   Medical Diagnosis from Referral:   M48.02 (ICD-10-CM) - Foraminal stenosis of cervical region   M47.22 (ICD-10-CM) - Osteoarthritis of spine with radiculopathy, cervical region   M48.061 (ICD-10-CM) - Foraminal stenosis of lumbar region      Evaluation Date: 2/21/2024  Authorization Period Expiration: 12/30/24  Plan of Care Expiration: 4/10/24     Visit # / Visits authorized: 2/ 4    (4)     Precautions: Standard and Fall     Time In: 0938  (late check in)  Time Out: 1025   Total Billable Time: 47 minutes  Subjective     Patient reports: " I'm alright".  She was compliant with home exercise program.  Response to previous treatment:tolerate RX.  Functional change: none    Pain:  6/10  Location: low back      Objective      + antalgic gait.    Treatment     Zhang received the treatments listed below:      therapeutic exercises to develop strength, endurance, core strengthening and posture for 47 minutes including:  Nustep all 4's L2 10'  Standing gastro stretches 2'  Heel raises with ball squeezes 20  Mini squats with ball squeeze 20  Supine hamstring stretches  Knee to chest single 10/10; double 10  Piriformis stretches 30 second x 2 R/L  Posterior pelvic tilt 20  Straight leg raise/abdominal bracing 15/15  Trunk rotation using theraball 20/20  Reverse sit up using theraball 20  Prone lying 2'  Reviewed neck/shoulder AROM/stretches       Patient Education and Home Exercises       Education provided:   - Discussed Pelvic health therapy being available in this clinic also.    Written Home Exercises Provided: Patient instructed to " cont prior HEP. Exercises were reviewed and Zhang was able to demonstrate them prior to the end of the session.  Zhang demonstrated good  understanding of the education provided. See Electronic Medical Record under Patient Instructions for exercises provided during therapy sessions    Assessment     Patient initial stiffness but overall had good flexibility. Both knees has recurvatum tendencies. Marked  antalgic gait. Ambulates with and without cane,  No mention of pain throughout session. Neck mobility and shoulder flexibility  with active stretches and reviewed Home Exercises  Tolerate Rx well.    Zhang Is progressing well towards her goals.   Patient prognosis is Good.     Patient will continue to benefit from skilled outpatient physical therapy to address the deficits listed in the problem list box on initial evaluation, provide pt/family education and to maximize pt's level of independence in the home and community environment.     Patient's spiritual, cultural and educational needs considered and pt agreeable to plan of care and goals.     Anticipated barriers to physical therapy: none    Goals:   Short Term Goals:   Patient will report compliance to home exercises given.  (Ongoing)  Patient will tolerate 10 minutes cardio exercuises on Nustep.  (Ongoing)  Patient will demonstrate erect  posture during standing Exercises. (Ongoing)     Long Term Goals:    Patient will have pain-free mobility 0/10 90% of the time. (Ongoing)  Patient will ambulate independently without use of Assistive device. (Ongoing)  Patient will improve FOTO scores 56/100 or better. (Ongoing)      Plan     Core/leg strengthening  Gentle Stretches  Continue Plan of care.    Nomi Casiano, PT       monitor only/Cardiac Monitor/Defib/ACLS/Rescue Kit/O2/BVM

## 2024-03-12 ENCOUNTER — PATIENT MESSAGE (OUTPATIENT)
Dept: ADMINISTRATIVE | Facility: HOSPITAL | Age: 49
End: 2024-03-12
Payer: MEDICAID

## 2024-03-12 ENCOUNTER — CLINICAL SUPPORT (OUTPATIENT)
Dept: REHABILITATION | Facility: HOSPITAL | Age: 49
End: 2024-03-12
Payer: COMMERCIAL

## 2024-03-12 DIAGNOSIS — M53.82 DECREASED ROM OF INTERVERTEBRAL DISCS OF CERVICAL SPINE: ICD-10-CM

## 2024-03-12 DIAGNOSIS — Z12.11 ENCOUNTER FOR COLORECTAL CANCER SCREENING: Primary | ICD-10-CM

## 2024-03-12 DIAGNOSIS — Z12.12 ENCOUNTER FOR COLORECTAL CANCER SCREENING: Primary | ICD-10-CM

## 2024-03-12 DIAGNOSIS — M53.86 DECREASED RANGE OF MOTION OF LUMBAR SPINE: Primary | ICD-10-CM

## 2024-03-12 PROCEDURE — 97110 THERAPEUTIC EXERCISES: CPT | Mod: PO

## 2024-03-12 NOTE — PROGRESS NOTES
[facility]   Physical Therapy Treatment Note      Name: Zhang Kurtz  Clinic Number: [id number]    Therapy Diagnosis:   Encounter Diagnoses   Name Primary?    Decreased range of motion of lumbar spine Yes    Decreased ROM of intervertebral discs of cervical spine        Physician: Rian Jane PA    Visit Date: 3/12/2024  Physician Orders: PT Eval and Treat   Medical Diagnosis from Referral:   M48.02 (ICD-10-CM) - Foraminal stenosis of cervical region   M47.22 (ICD-10-CM) - Osteoarthritis of spine with radiculopathy, cervical region   M48.061 (ICD-10-CM) - Foraminal stenosis of lumbar region      Evaluation Date: 2/21/2024  Authorization Period Expiration: 12/30/24  Plan of Care Expiration: 4/10/24     Visit # / Visits authorized: 3/ 4    (5)     Precautions: Standard and Fall     Time In: 1130  Time Out: 1200   Total Billable Time: 45 minutes  Subjective     Patient reports: my neck has not bothered me since I had the injection but my low back is the same.  She was compliant with home exercise program.  Response to previous treatment:tolerate RX.  Functional change: none    Pain:  5/10  Location: upper glutes      Objective      No significant tightness to hamstrings and piriformis.    Treatment     Zhang received the treatments listed below:      therapeutic exercises/procedures to develop strength, endurance, core strengthening and posture for 30 minutes including:  Nustep all 4's L2 10'  Supine/hamstring/ gastroc stretches 2'  Knee to chest single 10/10; double 10  Piriformis stretches 30 second x 2 R/L  Posterior pelvic tilt 20  Lumbar decompression, intermittent mechanical traction 15 minutes  with MHP : 40 second pull at 55#s; 10 second rest 10#s    Patient Education and Home Exercises       Education provided:   -Effects of traction/decompression.    Written Home Exercises Provided: Patient instructed to cont prior HEP. Exercises were reviewed and Zhang was able to  demonstrate them prior to the end of the session.  Zhang demonstrated good  understanding of the education provided. See Electronic Medical Record under Patient Instructions for exercises provided during therapy sessions    Assessment     Patient states she'll try anything just to avoid going to have surgery.  Initiated on lumbar decompression traction with no adverse effect.  Tolerate Rx well.    Zhang Is progressing well towards her goals.   Patient prognosis is Good.     Patient will continue to benefit from skilled outpatient physical therapy to address the deficits listed in the problem list box on initial evaluation, provide pt/family education and to maximize pt's level of independence in the home and community environment.     Patient's spiritual, cultural and educational needs considered and pt agreeable to plan of care and goals.     Anticipated barriers to physical therapy: none    Goals:   Short Term Goals:   Patient will report compliance to home exercises given.  (Ongoing)  Patient will tolerate 10 minutes cardio exercuises on Nustep.  (Ongoing)  Patient will demonstrate erect  posture during standing Exercises. (Ongoing)     Long Term Goals:    Patient will have pain-free mobility 0/10 90% of the time. (Ongoing)  Patient will ambulate independently without use of Assistive device. (Ongoing)  Patient will improve FOTO scores 56/100 or better. (Ongoing)      Plan     Lumbar traction/decompression.  Core/leg strengthening  Gentle Stretches  Continue Plan of care.    Nomi Casiano, PT

## 2024-03-14 ENCOUNTER — CLINICAL SUPPORT (OUTPATIENT)
Dept: REHABILITATION | Facility: HOSPITAL | Age: 49
End: 2024-03-14
Payer: COMMERCIAL

## 2024-03-14 DIAGNOSIS — M53.86 DECREASED RANGE OF MOTION OF LUMBAR SPINE: Primary | ICD-10-CM

## 2024-03-14 DIAGNOSIS — M53.82 DECREASED ROM OF INTERVERTEBRAL DISCS OF CERVICAL SPINE: ICD-10-CM

## 2024-03-14 PROCEDURE — 97110 THERAPEUTIC EXERCISES: CPT | Mod: PO

## 2024-03-14 NOTE — PROGRESS NOTES
[facility]   Physical Therapy Treatment Note      Name: Zhang Kurzt  Clinic Number: [id number]    Therapy Diagnosis:   Encounter Diagnoses   Name Primary?    Decreased range of motion of lumbar spine Yes    Decreased ROM of intervertebral discs of cervical spine        Physician: Rian Jane PA    Visit Date: 3/14/2024  Physician Orders: PT Eval and Treat   Medical Diagnosis from Referral:   M48.02 (ICD-10-CM) - Foraminal stenosis of cervical region   M47.22 (ICD-10-CM) - Osteoarthritis of spine with radiculopathy, cervical region   M48.061 (ICD-10-CM) - Foraminal stenosis of lumbar region      Evaluation Date: 2/21/2024  Authorization Period Expiration: 12/30/24  Plan of Care Expiration: 4/10/24     Visit # / Visits authorized: 4/ 4    (6)     Precautions: Standard and Fall     Time In: 1057  Time Out: 1235   Total Billable Time: 38 minutes  Subjective     Patient reports: shaking of both legs.  She was compliant with home exercise program.  Response to previous treatment: tolerate RX.  Functional change: improved gait    Pain:  3/10  Location: BLE's generalized      Objective      + tremors BLE's on mini squats in shuttle press    Treatment     Zhang received the treatments listed below:      therapeutic exercises/procedures to develop strength, endurance, core strengthening and posture for 30 minutes including:  Nustep all 4's L2 10'  Shuttle press gastroc stretches 62#s 2'  Heel raises  37#s R 20; L 20  Quarter knee bends shuttle press 50#s R20; L 20  Supine hamstring stretches 2'  Ankle dorsiflexion manual resist 20/20  Quad sets manual resist 20/20  Bridging with knee extended ankle on bolster 20  Straight leg raising 3#s 10x 2/10x2  Crooklying hip abduction/adduction manual resist 20/20    Patient Education and Home Exercises       Education provided:   -Effects of traction/decompression.    Written Home Exercises Provided: Patient instructed to cont prior HEP.  Exercises were reviewed and Zhang was able to demonstrate them prior to the end of the session.  Zhang demonstrated good  understanding of the education provided. See Electronic Medical Record under Patient Instructions for exercises provided during therapy sessions    Assessment     Patient has trembling on resistive knee extension but improved gait with no significant antalgic pattern after session. No aggravated discomforts reported.  Tolerate Rx well.    Zhang Is progressing well towards her goals.   Patient prognosis is Good.     Patient will continue to benefit from skilled outpatient physical therapy to address the deficits listed in the problem list box on initial evaluation, provide pt/family education and to maximize pt's level of independence in the home and community environment.     Patient's spiritual, cultural and educational needs considered and pt agreeable to plan of care and goals.     Anticipated barriers to physical therapy: none    Goals:   Short Term Goals:   Patient will report compliance to home exercises given.  (Ongoing)  Patient will tolerate 10 minutes cardio exercuises on Nustep.  (Ongoing)  Patient will demonstrate erect  posture during standing Exercises. (Ongoing)     Long Term Goals:    Patient will have pain-free mobility 0/10 90% of the time. (Ongoing)  Patient will ambulate independently without use of Assistive device. (Ongoing)  Patient will improve FOTO scores 56/100 or better. (Ongoing)      Plan     Core/leg strengthening  Gentle Stretches  Continue Plan of care.    Nomi Casiano, PT

## 2024-03-19 ENCOUNTER — CLINICAL SUPPORT (OUTPATIENT)
Dept: REHABILITATION | Facility: HOSPITAL | Age: 49
End: 2024-03-19
Payer: MEDICAID

## 2024-03-19 DIAGNOSIS — M53.82 DECREASED ROM OF INTERVERTEBRAL DISCS OF CERVICAL SPINE: ICD-10-CM

## 2024-03-19 DIAGNOSIS — M53.86 DECREASED RANGE OF MOTION OF LUMBAR SPINE: Primary | ICD-10-CM

## 2024-03-19 DIAGNOSIS — R29.898 WEAKNESS OF BOTH LOWER EXTREMITIES: ICD-10-CM

## 2024-03-19 PROCEDURE — 97110 THERAPEUTIC EXERCISES: CPT | Mod: PO

## 2024-03-19 NOTE — PROGRESS NOTES
[facility]   Physical Therapy Treatment Note      Name: Zhang Kurtz  Clinic Number: [id number]    Therapy Diagnosis:   Encounter Diagnoses   Name Primary?    Decreased range of motion of lumbar spine Yes    Decreased ROM of intervertebral discs of cervical spine     Weakness of both lower extremities        Physician: Rian Jane PA    Visit Date: 3/19/2024  Physician Orders: PT Eval and Treat   Medical Diagnosis from Referral:   M48.02 (ICD-10-CM) - Foraminal stenosis of cervical region   M47.22 (ICD-10-CM) - Osteoarthritis of spine with radiculopathy, cervical region   M48.061 (ICD-10-CM) - Foraminal stenosis of lumbar region      Evaluation Date: 2/21/2024  Authorization Period Expiration: 12/30/24  Plan of Care Expiration: 4/10/24     Visit # / Visits authorized: 4/ 4    (6)     Precautions: Standard and Fall     Time In: 1057  Time Out: 1235   Total Billable Time: 38 minutes  Subjective     Patient reports: knees buckling and falling 2 days ago with no injury  She was compliant with home exercise program.  Response to previous treatment: tolerate RX.  Functional change: none reported    Pain:  0/10  Location: none indicated    Objective      BLE's 4-/5 gross motor strength    Treatment     Zhang received the treatments listed below:      therapeutic exercises/procedures to develop strength, endurance, core strengthening and posture for 30 minutes including:  Nustep all 4's L2 10'  Shuttle press gastroc stretches 62#s 2'  Heel raises  37#s R 20; L 20  Quarter knee bends shuttle press 50#s R20; L 20  Supine hamstring stretches 2'  Ankle dorsiflexion manual resist 20/20  Quad sets manual resist 20/20  Bridging with knee extended ankle on bolster 20  Straight leg raising 3#s 10x 2/10x2  Crooklying hip abduction/adduction manual resist 20/20    Patient Education and Home Exercises       Education provided:   -Effects of traction/decompression.    Written Home Exercises  Provided: Patient instructed to cont prior HEP. Exercises were reviewed and Zhang was able to demonstrate them prior to the end of the session.  Zhang demonstrated good  understanding of the education provided. See Electronic Medical Record under Patient Instructions for exercises provided during therapy sessions    Assessment     Patient has iniial apprehension when ambulated without AD. Sustains dynamic balance without cane. Less incidence  of tremors noted  Tolerate Rx well.    Zhang Is progressing well towards her goals.   Patient prognosis is Good.     Patient will continue to benefit from skilled outpatient physical therapy to address the deficits listed in the problem list box on initial evaluation, provide pt/family education and to maximize pt's level of independence in the home and community environment.     Patient's spiritual, cultural and educational needs considered and pt agreeable to plan of care and goals.     Anticipated barriers to physical therapy: none    Goals:   Short Term Goals:   Patient will report compliance to home exercises given.  (Ongoing)  Patient will tolerate 10 minutes cardio exercuises on Nustep.  (Ongoing)  Patient will demonstrate erect  posture during standing Exercises. (Ongoing)     Long Term Goals:    Patient will have pain-free mobility 0/10 90% of the time. (Ongoing)  Patient will ambulate independently without use of Assistive device. (Ongoing)  Patient will improve FOTO scores 56/100 or better. (Ongoing)      Plan     FOTO update  Core/leg strengthening  Gentle Stretches  Continue Plan of care.    Nomi Casiano, PT

## 2024-03-21 ENCOUNTER — CLINICAL SUPPORT (OUTPATIENT)
Dept: REHABILITATION | Facility: HOSPITAL | Age: 49
End: 2024-03-21
Payer: MEDICAID

## 2024-03-21 DIAGNOSIS — M53.86 DECREASED RANGE OF MOTION OF LUMBAR SPINE: Primary | ICD-10-CM

## 2024-03-21 DIAGNOSIS — M53.82 DECREASED ROM OF INTERVERTEBRAL DISCS OF CERVICAL SPINE: ICD-10-CM

## 2024-03-21 PROCEDURE — 97110 THERAPEUTIC EXERCISES: CPT | Mod: PO

## 2024-03-21 NOTE — PLAN OF CARE
[facility]  Physical Therapy Plan of Care Note     Name: Zhang Kurtz  Clinic Number: [id number]    Therapy Diagnosis:   Encounter Diagnoses   Name Primary?    Decreased range of motion of lumbar spine Yes    Decreased ROM of intervertebral discs of cervical spine      Physician: Rian Jane PA    Visit Date: 3/21/2024  [facility]   Physical Therapy Treatment Note       Name: Zhang Kurtz  Clinic Number: [id number]     Therapy Diagnosis:        Encounter Diagnoses   Name Primary?    Decreased range of motion of lumbar spine Yes    Decreased ROM of intervertebral discs of cervical spine           Physician: Rian Jane PA     Visit Date: 3/21/2024    Physician Orders: PT Eval and Treat   Medical Diagnosis from Referral:   M48.02 (ICD-10-CM) - Foraminal stenosis of cervical region   M47.22 (ICD-10-CM) - Osteoarthritis of spine with radiculopathy, cervical region   M48.061 (ICD-10-CM) - Foraminal stenosis of lumbar region      Evaluation Date: 2/21/2024  Authorization Period Expiration: 12/30/24  Plan of Care Expiration: 4/10/24     Visit # / Visits authorized: 6/ 4    (8)     FOTO: 40/100    Precautions: Standard and Fall  Functional Level Prior to Evaluation:  antalgic spastic gait, apprehensive with balance. Ambulates with straight cane.       Subjective     Update: c/o both legs trembling when up for a while.  Pain level: 5/10      Location; R shoulder joint    Objective      Update:    Posture: left hip/shoulder lower; knee recurvatum  Palpation: no tenderness, no ankle sweling  Sensation: reports of paresthesia to posterior thigh  Range of Motion/Strength: BUE's WFL; Neck ROM WFL with pain to left lateral tilt  BLE's 3+/5 gross motor strength   WFL ROM.  Thoracic/Lumbar  ROM Pain/Dysfunction with Movement:   Flexion    50     Extension    20     Right side bending    30     Left side bending    30     Right rotation     45     Left rotation      45        Flexibility: hypermobility of knee towards extension/recurvatum  Gait: With AD.  Device Used -  Cane  Analysis: R side vaulting with decreased knee flexion on swing, marked assymetric antalgic gait.  Bed Mobility:Independent  Transfers: Independent  Special Tests: 30 second sit to stand 8  Timed Up and Go:   without cane    1. 16.73 sec       2.  14.28 sec     3. 13.55 sec          Mean: 14.85 sec      Assessment     Update: Patient seen x 3 weeks of therapy with less c/o pain to neck and low back. No significant neck or trunk limitation of motion. Presents with tremors to both LE's after a short period of exercise or ambulation.  Able to ambulate without assistive device short distance otherwise use of  cane for functional gait in community.  Reevaluation secondary to  functional limitations from  lower extremity weakness     Plan     Continue as per Plan of care.    Nomi Casiano, PT

## 2024-03-21 NOTE — PROGRESS NOTES
[facility]   Physical Therapy Treatment Note      Name: Zhang Kurtz  Clinic Number: [id number]    Therapy Diagnosis:   Encounter Diagnoses   Name Primary?    Decreased range of motion of lumbar spine Yes    Decreased ROM of intervertebral discs of cervical spine        Physician: Rian Jane PA    Visit Date: 3/21/2024  Physician Orders: PT Eval and Treat   Medical Diagnosis from Referral:   M48.02 (ICD-10-CM) - Foraminal stenosis of cervical region   M47.22 (ICD-10-CM) - Osteoarthritis of spine with radiculopathy, cervical region   M48.061 (ICD-10-CM) - Foraminal stenosis of lumbar region      Evaluation Date: 2/21/2024  Authorization Period Expiration: 12/30/24  Plan of Care Expiration: 4/10/24     Visit # / Visits authorized: 6/ 4    (8)     Precautions: Standard and Fall     Time In: 1050  Time Out: 1130   Total Billable Time: 40 minutes  Subjective     Patient reports: R shoulder pain. Didn't sleep well  She was compliant with home exercise program.  Response to previous treatment: tolerate RX.  Functional change: none reported    Pain:  5/10  Location: R shoulder    Objective      See Progress Updated Plan of care    Treatment     Zhang received the treatments listed below:      therapeutic exercises/procedures to develop strength, endurance, core strengthening and posture for 30 minutes including:  Nustep all 4's L2 10'  Sit to stand 10  Timed Up dan GO  BalancingActivities  Supine strengthening Exercises; SLR, Bridiging    Patient Education and Home Exercises       Education provided:   Discussed plan of  care    Written Home Exercises Provided: Patient instructed to cont prior HEP. Exercises were reviewed and Zhang was able to demonstrate them prior to the end of the session.  Zhang demonstrated good  understanding of the education provided. See Electronic Medical Record under Patient Instructions for exercises provided during therapy sessions    Assessment      Patient has made progress in terms of pain level and location of discomforts no longer on original chronic problem. Perceived weakness and anxiety causes trembling when subjected to long distance ambulation and exercises.  Tolerate Rx well.    Zhang Is progressing well towards her goals.   Patient prognosis is Good.     Patient will continue to benefit from skilled outpatient physical therapy to address the deficits listed in the problem list box on initial evaluation, provide pt/family education and to maximize pt's level of independence in the home and community environment.     Patient's spiritual, cultural and educational needs considered and pt agreeable to plan of care and goals.     Anticipated barriers to physical therapy: none    Goals:   Short Term Goals:   Patient will report compliance to home exercises given.  (Ongoing)  Patient will tolerate 10 minutes cardio exercuises on Nustep.  (met)  Patient will demonstrate erect  posture during standing Exercises. (ongoing)     Long Term Goals:    Patient will have pain-free mobility 0/10 90% of the time. (Ongoing)  Patient will ambulate independently without use of Assistive device. (Progressing)  Patient will improve FOTO scores 56/100 or better. (Ongoing)      Plan     See Updated Plan of care.    Nomi Casiano, PT

## 2024-03-26 ENCOUNTER — CLINICAL SUPPORT (OUTPATIENT)
Dept: REHABILITATION | Facility: HOSPITAL | Age: 49
End: 2024-03-26
Payer: COMMERCIAL

## 2024-03-26 DIAGNOSIS — M53.86 DECREASED RANGE OF MOTION OF LUMBAR SPINE: Primary | ICD-10-CM

## 2024-03-26 DIAGNOSIS — M53.82 DECREASED ROM OF INTERVERTEBRAL DISCS OF CERVICAL SPINE: ICD-10-CM

## 2024-03-26 PROCEDURE — 97110 THERAPEUTIC EXERCISES: CPT | Mod: PO

## 2024-03-26 NOTE — PROGRESS NOTES
[facility]  Physical Therapy Discharge Note    Name: Zhang Kurtz  Clinic Number: [id number]    Therapy Diagnosis:   Encounter Diagnoses   Name Primary?    Decreased range of motion of lumbar spine Yes    Decreased ROM of intervertebral discs of cervical spine      Physician: Rian Jane PA    Physician Orders: PT Eval and treat  Medical Diagnosis:   M48.02 (ICD-10-CM) - Foraminal stenosis of cervical region   M47.22 (ICD-10-CM) - Osteoarthritis of spine with radiculopathy, cervical region   M48.061 (ICD-10-CM) - Foraminal stenosis of lumbar region     Evaluation Date: 2/21/24      Date of Last visit: 3/26/24  Total Visits Received: 8    Today's Visit:   Time IN: 1130  Time Out 1215  Total time in minutes:  45    Treatment: Thera Ex 45  Includes:   Nustep L4 all 4's 10'  Gastroc/hamstring stretches  Knee to chest  single/double  Posterior pelvic tilt  Piriformis stretches  Shoulder shrugs  Scapular retractions  Shoulder circles  Neck lateral tilt  R/L  Neck rotation R/L  Chin tucks   Reviewed Home exercise program.    ASSESSMENT      Patient seen x 4 weeks of therapy. Making good progress with less report of pain to neck and low back. Pain discomforts to right shoulder at minimal level. ROM with no limitations to neck except right side rotation 70% ; hypermobility of the lower extremity.  Doing well but continue to have recurring antalgic gait from weak hip. Able to ambulate without AD but cane helps correct gait.    Discharge reason: Patient has completed allowable visits authorized by insurance    Discharge FOTO Score: 40/100    Goals: Short Term Goals: 2 weeks   Patient will report compliance to home exercises given.   (Met)  Patient will tolerate 10 minutes cardio exercuises on Nustep. (Met)  Patient will demonstrate erect  posture during standing Exercises. (Met)     Long Term Goals: 6 weeks   Patient will have pain-free mobility 0/10 90% of the time.  (Partially met)    Patient will ambulate independently without use of Assistive device.  (Partially met)  Patient will improve FOTO scores 56/100 or better.  (Unmet)    PLAN   This patient is discharged from Physical Therapy      Nomi Casiano PT

## 2024-03-28 ENCOUNTER — OFFICE VISIT (OUTPATIENT)
Dept: FAMILY MEDICINE | Facility: CLINIC | Age: 49
End: 2024-03-28
Payer: MEDICAID

## 2024-03-28 VITALS
DIASTOLIC BLOOD PRESSURE: 78 MMHG | HEART RATE: 73 BPM | SYSTOLIC BLOOD PRESSURE: 125 MMHG | OXYGEN SATURATION: 99 % | HEIGHT: 58 IN | TEMPERATURE: 99 F | WEIGHT: 121.13 LBS | BODY MASS INDEX: 25.42 KG/M2

## 2024-03-28 DIAGNOSIS — Z13.31 POSITIVE DEPRESSION SCREENING: Primary | ICD-10-CM

## 2024-03-28 DIAGNOSIS — F31.32 BIPOLAR AFFECTIVE DISORDER, CURRENTLY DEPRESSED, MODERATE: ICD-10-CM

## 2024-03-28 PROCEDURE — 1159F MED LIST DOCD IN RCRD: CPT | Mod: CPTII,,,

## 2024-03-28 PROCEDURE — 3074F SYST BP LT 130 MM HG: CPT | Mod: CPTII,,,

## 2024-03-28 PROCEDURE — 3008F BODY MASS INDEX DOCD: CPT | Mod: CPTII,,,

## 2024-03-28 PROCEDURE — 99999 PR PBB SHADOW E&M-EST. PATIENT-LVL IV: CPT | Mod: PBBFAC,,,

## 2024-03-28 PROCEDURE — 99214 OFFICE O/P EST MOD 30 MIN: CPT | Mod: S$PBB,,,

## 2024-03-28 PROCEDURE — 3078F DIAST BP <80 MM HG: CPT | Mod: CPTII,,,

## 2024-03-28 PROCEDURE — 99214 OFFICE O/P EST MOD 30 MIN: CPT | Mod: PBBFAC,PN

## 2024-03-28 RX ORDER — QUETIAPINE FUMARATE 50 MG/1
50 TABLET, FILM COATED ORAL NIGHTLY
Qty: 30 TABLET | Refills: 1 | Status: SHIPPED | OUTPATIENT
Start: 2024-03-28

## 2024-03-28 NOTE — PROGRESS NOTES
SUBJECTIVE:      Patient ID: Zhang Kurtz is a 48 y.o. female.    Chief Complaint: Depression (Says her depression medicine makes her constipated so she does not it. Wants to talk about getting Medication changed.)    Zhang is a 48 yr old female, who is an established patient.  She is here today for follow-up on depression/bipolar and requesting referral physical therapy. 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     Patient reports that she stopped taking her Vraylar a little over a month ago, due to constipation and insomnia.  She is requesting to change her medications at this time.  Reports that Zoloft was effective in the past and Seroquel.  There has been a history of noncompliance with medications.  Patient will abruptly stopped medications. She reports that she is currently homeless at this time.  She is staying in between family and friends' houses.  Denies suicidal ideation, homicidal ideation, or self-harm.  Denies auditory or visual hallucinations, or experiencing manic episodes.  Does report that she still has excessive worrying, fatigue, insomnia, irritability, and feeling of worthlessness.  Patient was referred to a psychiatrist for med management, anxiety, depression, and bipolar, unfortunately she has not been able to see anybody.  We will send a new referral today to a different location.   (Failed:  Wellbutrin, and now Vraylar)    She is also requesting a referral for physical therapy.  States that she was going to physical therapy up until yesterday was her last visit.  Review of referral shows that patient was referred to physical therapy by a neurologist recently.  She was only approved by Medicaid for 8 visits and she is now completed them as of yesterday.  Patient will be  following up with Neurology along with: Medicate about an extension on visits.    Depression  Visit Type: follow-up  Patient presents with the following symptoms: decreased concentration, depressed mood, excessive worry, feelings of hopelessness, feelings of worthlessness, insomnia, irritability, nervousness/anxiety and restlessness.  Patient is not experiencing: choking sensation, confusion, palpitations, panic, shortness of breath, suicidal ideas, suicidal planning and thoughts of death.  Frequency of symptoms: constantly   Severity: interfering with daily activities   Sleep quality: poor  Nighttime awakenings: several  Compliance with medications:  0-25%        Review of patient's allergies indicates:   Allergen Reactions    Codeine Nausea Only      Past Medical History:   Diagnosis Date    Abnormal Pap smear     High grade squamous intraepithelial lesion.    Anxiety     Breast disorder     lumps removed in the past,  bilateral    Cervical nerve root impingement 11/27/2023    Depression     Dysplasia of cervix     s/p CKC    Fatty liver     noted on 1/12 USG    Foraminal stenosis of cervical region 12/06/2012    Foraminal stenosis of lumbar region 11/27/2023    Generalized anxiety disorder 10/17/2012    Generalized headaches     Hyperlipidemia     Increased prolactin level     Manic depressive disorder 10/17/2012    Osteoarthritis of spine with radiculopathy, cervical region 12/06/2012    Pituitary microadenoma with hyperprolactinemia     Vitamin D deficiency      Past Surgical History:   Procedure Laterality Date    BREAST BIOPSY      bening    CERVICAL CONIZATION   W/ LASER       Current Outpatient Medications   Medication Sig Dispense Refill    cyclobenzaprine (FLEXERIL) 5 MG tablet Take 1 tablet (5 mg total) by mouth 3 (three) times daily as needed for Muscle spasms. 30 tablet 0    ergocalciferol (ERGOCALCIFEROL) 50,000 unit Cap Take 1 capsule (50,000 Units total) by mouth every 7 days. 12 capsule 3     gabapentin (NEURONTIN) 300 MG capsule Take 300 mg by mouth 3 (three) times daily.      meloxicam (MOBIC) 15 MG tablet Take 1 tablet (15 mg total) by mouth once daily. 30 tablet 0    traMADoL (ULTRAM) 50 mg tablet Take 1 tablet (50 mg total) by mouth every 6 (six) hours as needed for Pain. 28 tablet 2    QUEtiapine (SEROQUEL) 50 MG tablet Take 1 tablet (50 mg total) by mouth every evening. 30 tablet 1     No current facility-administered medications for this visit.     Family History   Problem Relation Age of Onset    Cancer Mother         lymph node CA    Asthma Daughter     Anesthesia problems Neg Hx     Clotting disorder Neg Hx       Social History     Socioeconomic History    Marital status: Single    Number of children: 2   Occupational History    Occupation: administrative coordinatory   Tobacco Use    Smoking status: Every Day     Current packs/day: 1.00     Average packs/day: 1 pack/day for 10.0 years (10.0 ttl pk-yrs)     Types: Cigars, Cigarettes    Smokeless tobacco: Never    Tobacco comments:     cigars   Substance and Sexual Activity    Alcohol use: Yes     Alcohol/week: 1.0 standard drink of alcohol     Types: 1 Standard drinks or equivalent per week    Drug use: No    Sexual activity: Yes     Partners: Male     Birth control/protection: None   Social History Narrative    ** Merged History Encounter **          Social Determinants of Health     Financial Resource Strain: High Risk (12/27/2023)    Overall Financial Resource Strain (CARDIA)     Difficulty of Paying Living Expenses: Very hard   Food Insecurity: Food Insecurity Present (12/27/2023)    Hunger Vital Sign     Worried About Running Out of Food in the Last Year: Often true     Ran Out of Food in the Last Year: Often true   Transportation Needs: No Transportation Needs (12/27/2023)    PRAPARE - Transportation     Lack of Transportation (Medical): No     Lack of Transportation (Non-Medical): No   Physical Activity: Inactive (12/27/2023)     Exercise Vital Sign     Days of Exercise per Week: 0 days     Minutes of Exercise per Session: 0 min   Stress: Stress Concern Present (12/27/2023)    [place] [employer] - Occupational Stress Questionnaire     Feeling of Stress : Very much   Social Connections: Unknown (12/27/2023)    Social Connection and Isolation Panel [NHANES]     Frequency of Communication with Friends and Family: More than three times a week     Frequency of Social Gatherings with Friends and Family: Once a week     Active Member of Clubs or Organizations: No     Attends Club or Organization Meetings: Never     Marital Status:    Housing Stability: High Risk (12/27/2023)    Housing Stability Vital Sign     Unable to Pay for Housing in the Last Year: Yes     Number of Places Lived in the Last Year: 1     Unstable Housing in the Last Year: No       Review of Systems   Constitutional:  Positive for activity change and irritability. Negative for chills, fatigue, fever and unexpected weight change.   HENT:  Negative for hearing loss, rhinorrhea and trouble swallowing.    Eyes:  Negative for discharge and visual disturbance.   Respiratory:  Negative for choking, chest tightness, shortness of breath and wheezing.    Cardiovascular:  Negative for chest pain, palpitations and leg swelling.   Gastrointestinal:  Negative for abdominal pain, blood in stool, diarrhea, nausea and vomiting.   Endocrine: Negative for polydipsia and polyuria.   Genitourinary:  Negative for bladder incontinence, difficulty urinating, dysuria, flank pain, frequency, hematuria, menstrual problem, pelvic pain and urgency.   Musculoskeletal:  Positive for arthralgias, back pain, gait problem, leg pain, myalgias, neck pain and neck stiffness. Negative for joint swelling.   Integumentary:  Negative for rash and wound.   Neurological:  Positive for weakness and numbness (numbness and tingling in bilateral hands). Negative for dizziness, vertigo, tremors,  "seizures, syncope, light-headedness, headaches, memory loss and coordination difficulties.   Psychiatric/Behavioral:  Positive for agitation, decreased concentration, depression and dysphoric mood. Negative for confusion, self-injury, sleep disturbance and suicidal ideas. The patient is nervous/anxious and has insomnia.       OBJECTIVE:      Vitals:    03/28/24 1015   BP: 125/78   BP Location: Right arm   Patient Position: Sitting   BP Method: Medium (Automatic)   Pulse: 73   Temp: 98.6 °F (37 °C)   TempSrc: Oral   SpO2: 99%  Comment: Couldn't obtain. Has acrylic nails   Weight: 54.9 kg (121 lb 1.6 oz)   Height: 4' 10" (1.473 m)     Physical Exam  Vitals and nursing note reviewed.   Constitutional:       General: She is not in acute distress.     Appearance: Normal appearance. She is well-developed. She is not ill-appearing.   HENT:      Head: Normocephalic and atraumatic.      Nose: Nose normal. No rhinorrhea.      Mouth/Throat:      Pharynx: Uvula midline. No posterior oropharyngeal erythema.   Eyes:      General: Lids are normal.      Conjunctiva/sclera: Conjunctivae normal.      Pupils: Pupils are equal, round, and reactive to light.      Right eye: Pupil is round and reactive.      Left eye: Pupil is round and reactive.   Neck:      Thyroid: No thyroid mass, thyromegaly or thyroid tenderness.      Vascular: No JVD.      Trachea: Trachea normal.      Meningeal: Brudzinski's sign and Kernig's sign absent.   Cardiovascular:      Rate and Rhythm: Normal rate and regular rhythm.      Pulses: Normal pulses.      Heart sounds: Normal heart sounds.   Pulmonary:      Effort: Pulmonary effort is normal. No tachypnea or respiratory distress.      Breath sounds: Normal breath sounds. No wheezing, rhonchi or rales.   Musculoskeletal:      Cervical back: Neck supple. Spasms present. No swelling, deformity, erythema, tenderness, bony tenderness or crepitus. Pain with movement, spinous process tenderness and muscular " tenderness present. Decreased range of motion.      Thoracic back: Normal.      Lumbar back: No swelling, edema, deformity, signs of trauma or bony tenderness. Decreased range of motion.      Right lower leg: No edema.      Left lower leg: No edema.   Lymphadenopathy:      Cervical: No cervical adenopathy.   Skin:     General: Skin is warm and dry.      Findings: No rash.   Neurological:      Mental Status: She is alert and oriented to person, place, and time.      Cranial Nerves: Cranial nerves 2-12 are intact.      Sensory: No sensory deficit.      Motor: No weakness or tremor.      Coordination: Coordination normal.      Gait: Gait abnormal (noted with a right-sided limp, ambulates with cane).   Psychiatric:         Mood and Affect: Mood is not elated. Affect is flat. Affect is not labile, blunt, angry or tearful.         Behavior: Behavior is not agitated, slowed, aggressive, hyperactive or combative. Behavior is cooperative.         Thought Content: Thought content normal. Thought content does not include homicidal or suicidal ideation. Thought content does not include homicidal or suicidal plan.         Cognition and Memory: Cognition normal.        Assessment:       1. Positive depression screening    2. Bipolar affective disorder, currently depressed, moderate        Plan:       Positive depression screening  Comments:  I have reviewed the positive depression score which warrants active treatment with psychotherapy and/or medications.  Medication adjustments today. Patient was encouraged to avoid abrupt cessation of medication and was educated on the potential medication side effects.   An adjunctive treatment includes:  -Exercise 20-30 min daily- increases energy and wellbeing  -Obtain 7-8hr of sleep at night (avoid caffeine and watching TV late at night)  -Participate in activities to enhance interpersonal relationship and hobbies   -Consider seeking counseling  -Will follow-up 4 weeks.    Advised patient  to go to ER if having Suicidal or Homicidal Ideation.    Bipolar affective disorder, currently depressed, moderate  Continue to stop the Vraylar.  Begin Seroquel 50 mg nightly.  Discuss with patient that we will re-evaluate in 3-4 weeks.  And at that time we will discuss addition of Zoloft as an adjunct therapy.  Patient voiced understanding and agreed.  Discuss with patient if she begins to experience suicidal, homicidal, or self-harm to go straight to the ED or call 911.  New referral sent for psychiatrist for medication management.  Long discussion with patient regarding medications and compliance.  -     QUEtiapine (SEROQUEL) 50 MG tablet; Take 1 tablet (50 mg total) by mouth every evening.  Dispense: 30 tablet; Refill: 1  -     Ambulatory referral/consult to Behavioral Health; Future; Expected date: 04/04/2024    I spent a total of 30 minutes on the day of the visit.  This includes face to face time and non-face to face time preparing to see the patient (eg, review of tests), obtaining and/or reviewing separately obtained history, documenting clinical information in the electronic or other health record, independently interpreting results and communicating results to the patient/family/caregiver, or care coordinator.     Follow up in about 1 month (around 4/28/2024) for Bipolar .      3/28/2024 LEN Escobar, FRANK    This note was created using [employer] voice recognition software that occasionally misinterprets phrases or words. I have reviewed the positive depression score which warrants active treatment with psychotherapy and/or medications.

## 2024-04-08 ENCOUNTER — OFFICE VISIT (OUTPATIENT)
Dept: ORTHOPEDICS | Facility: CLINIC | Age: 49
End: 2024-04-08
Payer: MEDICAID

## 2024-04-08 VITALS
SYSTOLIC BLOOD PRESSURE: 118 MMHG | HEIGHT: 58 IN | BODY MASS INDEX: 25.41 KG/M2 | DIASTOLIC BLOOD PRESSURE: 74 MMHG | WEIGHT: 121.06 LBS

## 2024-04-08 DIAGNOSIS — M50.30 DISC DISEASE, DEGENERATIVE, CERVICAL: ICD-10-CM

## 2024-04-08 DIAGNOSIS — M50.30 BULGING OF CERVICAL INTERVERTEBRAL DISC: ICD-10-CM

## 2024-04-08 DIAGNOSIS — G54.2 CERVICAL NERVE ROOT IMPINGEMENT: ICD-10-CM

## 2024-04-08 DIAGNOSIS — M48.02 STENOSIS, CERVICAL SPINE: ICD-10-CM

## 2024-04-08 DIAGNOSIS — M47.22 OSTEOARTHRITIS OF SPINE WITH RADICULOPATHY, CERVICAL REGION: ICD-10-CM

## 2024-04-08 DIAGNOSIS — M48.061 FORAMINAL STENOSIS OF LUMBAR REGION: ICD-10-CM

## 2024-04-08 DIAGNOSIS — M48.02 FORAMINAL STENOSIS OF CERVICAL REGION: ICD-10-CM

## 2024-04-08 DIAGNOSIS — G95.89 CERVICAL CORD MYELOMALACIA: Primary | ICD-10-CM

## 2024-04-08 PROCEDURE — 1159F MED LIST DOCD IN RCRD: CPT | Mod: CPTII,S$GLB,, | Performed by: ORTHOPAEDIC SURGERY

## 2024-04-08 PROCEDURE — 3074F SYST BP LT 130 MM HG: CPT | Mod: CPTII,S$GLB,, | Performed by: ORTHOPAEDIC SURGERY

## 2024-04-08 PROCEDURE — 1160F RVW MEDS BY RX/DR IN RCRD: CPT | Mod: CPTII,S$GLB,, | Performed by: ORTHOPAEDIC SURGERY

## 2024-04-08 PROCEDURE — 99213 OFFICE O/P EST LOW 20 MIN: CPT | Mod: S$GLB,,, | Performed by: ORTHOPAEDIC SURGERY

## 2024-04-08 PROCEDURE — 3078F DIAST BP <80 MM HG: CPT | Mod: CPTII,S$GLB,, | Performed by: ORTHOPAEDIC SURGERY

## 2024-04-08 PROCEDURE — 3008F BODY MASS INDEX DOCD: CPT | Mod: CPTII,S$GLB,, | Performed by: ORTHOPAEDIC SURGERY

## 2024-04-08 NOTE — PROGRESS NOTES
Subjective:       Patient ID: Zhang Kurtz is a 48 y.o. female.    Chief Complaint: Pain of the Neck (Patient is here for an 8 week f/up on cervical spine & PT status, states her pain is better than her last visit, PT helped a lot but PT has ended but would like to continue as it is very beneficial, has numbness & tingling in bilat arms to hands )      History of Present Illness    Prior to meeting with the patient I reviewed the medical chart in [facility]. This included reviewing the previous progress notes from our office, review of the patient's last appointment with their primary care provider, review of any visits to the emergency room, and review of any pain management appointments or procedures.   Follow-up of symptomatic cervical myelopathy patient continues to have some difficulty with gait but is improved with physical therapy she wants to continue more therapy she has not interested in surgical intervention no bowel or bladder incontinence    Current Medications  Current Outpatient Medications   Medication Sig Dispense Refill    cyclobenzaprine (FLEXERIL) 5 MG tablet Take 1 tablet (5 mg total) by mouth 3 (three) times daily as needed for Muscle spasms. 30 tablet 0    ergocalciferol (ERGOCALCIFEROL) 50,000 unit Cap Take 1 capsule (50,000 Units total) by mouth every 7 days. 12 capsule 3    gabapentin (NEURONTIN) 300 MG capsule Take 300 mg by mouth 3 (three) times daily.      meloxicam (MOBIC) 15 MG tablet Take 1 tablet (15 mg total) by mouth once daily. 30 tablet 0    QUEtiapine (SEROQUEL) 50 MG tablet Take 1 tablet (50 mg total) by mouth every evening. 30 tablet 1    traMADoL (ULTRAM) 50 mg tablet Take 1 tablet (50 mg total) by mouth every 6 (six) hours as needed for Pain. 28 tablet 2     No current facility-administered medications for this visit.       Allergies  Review of patient's allergies indicates:   Allergen Reactions    Codeine Nausea Only       Past Medical History  Past Medical History:   Diagnosis  Date    Abnormal Pap smear     High grade squamous intraepithelial lesion.    Anxiety     Breast disorder     lumps removed in the past,  bilateral    Cervical nerve root impingement 11/27/2023    Depression     Dysplasia of cervix     s/p CKC    Fatty liver     noted on 1/12 USG    Foraminal stenosis of cervical region 12/06/2012    Foraminal stenosis of lumbar region 11/27/2023    Generalized anxiety disorder 10/17/2012    Generalized headaches     Hyperlipidemia     Increased prolactin level     Manic depressive disorder 10/17/2012    Osteoarthritis of spine with radiculopathy, cervical region 12/06/2012    Pituitary microadenoma with hyperprolactinemia     Vitamin D deficiency        Surgical History  Past Surgical History:   Procedure Laterality Date    BREAST BIOPSY      bening    CERVICAL CONIZATION   W/ LASER         Family History:   Family History   Problem Relation Age of Onset    Cancer Mother         lymph node CA    Asthma Daughter     Anesthesia problems Neg Hx     Clotting disorder Neg Hx        Social History:   Social History     Socioeconomic History    Marital status: Single    Number of children: 2   Occupational History    Occupation: administrative coordinatory   Tobacco Use    Smoking status: Every Day     Current packs/day: 1.00     Average packs/day: 1 pack/day for 10.0 years (10.0 ttl pk-yrs)     Types: Cigars, Cigarettes    Smokeless tobacco: Never    Tobacco comments:     cigars   Substance and Sexual Activity    Alcohol use: Yes     Alcohol/week: 1.0 standard drink of alcohol     Types: 1 Standard drinks or equivalent per week    Drug use: No    Sexual activity: Yes     Partners: Male     Birth control/protection: None   Social History Narrative    ** Merged History Encounter **          Social Determinants of Health     Financial Resource Strain: High Risk (12/27/2023)    Overall Financial Resource Strain (CARDIA)     Difficulty of Paying Living Expenses: Very hard   Food Insecurity:  Food Insecurity Present (12/27/2023)    Hunger Vital Sign     Worried About Running Out of Food in the Last Year: Often true     Ran Out of Food in the Last Year: Often true   Transportation Needs: No Transportation Needs (12/27/2023)    PRAPARE - Transportation     Lack of Transportation (Medical): No     Lack of Transportation (Non-Medical): No   Physical Activity: Inactive (12/27/2023)    Exercise Vital Sign     Days of Exercise per Week: 0 days     Minutes of Exercise per Session: 0 min   Stress: Stress Concern Present (12/27/2023)    [place] [employer]ational [employer] - Occupational Stress Questionnaire     Feeling of Stress : Very much   Social Connections: Unknown (12/27/2023)    Social Connection and Isolation Panel [NHANES]     Frequency of Communication with Friends and Family: More than three times a week     Frequency of Social Gatherings with Friends and Family: Once a week     Active Member of Clubs or Organizations: No     Attends Club or Organization Meetings: Never     Marital Status:    Housing Stability: High Risk (12/27/2023)    Housing Stability Vital Sign     Unable to Pay for Housing in the Last Year: Yes     Number of Places Lived in the Last Year: 1     Unstable Housing in the Last Year: No       Hospitalization/Major Diagnostic Procedure:     Review of Systems     General/Constitutional:  Chills denies. Fatigue denies. Fever denies. Weight gain denies. Weight loss denies.    Respiratory:  Shortness of breath denies.    Cardiovascular:  Chest pain denies.    Gastrointestinal:  Constipation denies. Diarrhea denies. Nausea denies. Vomiting denies.     Hematology:  Easy bruising denies. Prolonged bleeding denies.     Genitourinary:  Frequent urination denies. Pain in lower back denies. Painful urination denies.     Musculoskeletal:  See HPI for details    Skin:  Rash denies.    Neurologic:  Dizziness denies. Gait abnormalities denies. Seizures denies. Tingling/Numbess  denies.    Psychiatric:  Anxiety denies. Depressed mood denies.     Objective:   Vital Signs: There were no vitals filed for this visit.     Physical Exam      General Examination:     Constitutional: The patient is alert and oriented to lace person and time. Mood is pleasant.     Head/Face: Normal facial features normal eyebrows    Eyes: Normal extraocular motion bilaterally    Lungs: Respirations are equal and unlabored    Gait is coordinated.    Cardiovascular: There are no swelling or varicosities present.    Lymphatic: Negative for adenopathy    Skin: Normal    Neurological: Level of consciousness normal. Oriented to place person and time and situation    Psychiatric: Oriented to time place person and situation  Physical examination essentially unchanged from previous visit  Lower extremity exam demonstrates significant clonus bilaterally. Hyperreflexia at 4 bilaterally. Spastic gait noted. Bilateral lower extremities distal neurovascular intact. Lumbar range of motion diminished in all planes mobility. Upper extremities with no focal neurologic weakness but positive Schmid's bilaterally.      XRAY Report/ Interpretation:      Cervical MRI reviewed the patient office today demonstrates significant degenerative disc disease C5-6 and C6-7 causing severe central spinal stenosis.  The area of myelomalacia within the spinal cord is slightly increased compared to the MRI about a year ago.     Assessment:       1. Cervical cord myelomalacia    2. Bulging of cervical intervertebral disc    3. Disc disease, degenerative, cervical    4. Foraminal stenosis of cervical region    5. Osteoarthritis of spine with radiculopathy, cervical region    6. Foraminal stenosis of lumbar region    7. Cervical nerve root impingement    8. Stenosis, cervical spine        Plan:       Zhang was seen today for pain.    Diagnoses and all orders for this visit:    Cervical cord myelomalacia    Bulging of cervical intervertebral  disc    Disc disease, degenerative, cervical    Foraminal stenosis of cervical region    Osteoarthritis of spine with radiculopathy, cervical region    Foraminal stenosis of lumbar region    Cervical nerve root impingement    Stenosis, cervical spine         No follow-ups on file.          We strongly recommend that the patient consider having C5-6 and C6-7 ACDF.  We explained to the patient that her issues with her gait will not improve unless she has surgery; although this is not a guarantee that her symptoms will improve.  We do not think that pain management or will make any significant difference in her ability to ambulate at this time.  We explained the natural history of cervical myelopathy     Treatment options were discussed with regards to the nature of the medical condition. Conservative pain intervention and surgical options were discussed in detail. The probability of success of each separate treatment option was discussed. The patient expressed a clear understanding of the treatment options. With regards to surgery, the procedure risk, benefits, complications, and outcomes were discussed. No guarantees were given with regards to surgical outcome.   The risk of complications, morbidity, and mortality of patient management decisions have been made at the time of this visit. These are associated with the patient's problems, diagnostic procedures and treatment options. This includes the possible management options selected and those considered but not selected by the patient after shared medical decision making we discussed with the patient.     This note was created using Dragon voice recognition software that occasionally misinterpreted phrases or words.

## 2024-04-11 ENCOUNTER — TELEPHONE (OUTPATIENT)
Dept: UROGYNECOLOGY | Facility: CLINIC | Age: 49
End: 2024-04-11
Payer: MEDICAID

## 2024-04-11 NOTE — TELEPHONE ENCOUNTER
----- Message from Nataliya Cabrales sent at 4/10/2024 12:15 PM CDT -----  Contact: self  Type: Needs Medical Advice  Who Called:  pt  Best Call Back Number: [phone]   Additional Information: pt called to confirm appt for today

## 2024-04-11 NOTE — TELEPHONE ENCOUNTER
Had an appointment yesterday and she states that the Gemtesa is working great for her. Is asking if you can send a prescription to [facility]. Please advise?

## 2024-04-16 ENCOUNTER — CLINICAL SUPPORT (OUTPATIENT)
Dept: REHABILITATION | Facility: HOSPITAL | Age: 49
End: 2024-04-16
Payer: MEDICAID

## 2024-04-16 DIAGNOSIS — M50.30 BULGING OF CERVICAL INTERVERTEBRAL DISC: ICD-10-CM

## 2024-04-16 DIAGNOSIS — G95.89 CERVICAL CORD MYELOMALACIA: Primary | ICD-10-CM

## 2024-04-16 DIAGNOSIS — M50.30 DISC DISEASE, DEGENERATIVE, CERVICAL: ICD-10-CM

## 2024-04-16 PROCEDURE — 97164 PT RE-EVAL EST PLAN CARE: CPT | Mod: PO

## 2024-04-16 NOTE — PLAN OF CARE
[facility]   Physical Therapy Re- Evaluation      Name: Zhang Kurtz  [facility] Number: [id number]    Therapy Diagnosis:   Encounter Diagnoses   Name Primary?    Cervical cord myelomalacia Yes    Bulging of cervical intervertebral disc     Disc disease, degenerative, cervical         Physician: Tim Crowe MD    Physician Orders: PT Eval and Treat   Medical Diagnosis from Referral:   G95.89 (ICD-10-CM) - Cervical cord myelomalacia   M50.30 (ICD-10-CM) - Bulging of cervical intervertebral disc   M50.30 (ICD-10-CM) - Disc disease, degenerative, cervical     Evaluation Date: 4/16/2024  Authorization Period Expiration: 4/8/25  Plan of Care Expiration: 6/8/24    Visit # / Visits authorized: 1/ 1   FOTO: 43/ 100    Precautions: Standard and Fall     Time In: 1515  Time Out: 1600  Total Billable Time: 45 minutes    Subjective     Date of onset: 4/8/24    History of current condition - Zhang reports: right shoulder/neck pain with tingling to the side of the right leg, difficulty walking from poor coordination, tremors and perceived weakness of the legs. Familiar to us from recent therapy visits. Discharged secondary limited authorized therapy visits.    Falls: none    Imaging: MRI studies: Cervical 2/11/24   Multiplanar noncontrast imaging was performed. Comparison is made to January 2023.   There is no evidence of cervical fracture or osseous destructive lesion. Marrow signal changes at C5 and C6 are similar to the prior study and felt to be degenerative in nature. There is slight C5 retrolisthesis, chronic and unchanged. Alignment at all other levels is normal.   There is abnormal signal within the substance of the cervical spinal cord at the C6-7 level compatible with myelomalacia. Based on sagittal T2-weighted images, this is mildly increased compared to 2023.   C2-3: No significant abnormalities.   C3-4: No significant abnormalities.   C4-5: There is mild bilateral degenerative facet  hypertrophy with mild bilateral foraminal stenosis. No significant central canal narrowing.   C5-6: Broad-based disc/osteophyte complex is similar to the prior study. This results in moderate spinal stenosis and mild compression of the ventral aspect of the cervical cord, not significantly changed. In combination with facet hypertrophy, there is severe right and moderate to severe left foraminal stenosis, unchanged.   C6-7: Broad-based disc/osteophyte complex results in severe spinal stenosis, increased compared to the prior study, with associated cord compression and slightly increasing myelomalacia. Bilateral uncinate spurring causes severe bilateral foraminal stenosis, stable.   C7-T1: No significant abnormalities.  Prior Therapy: for neck and low back.  Current Level of Function: ambulates with straight cane; intermittent tremors R LE, marked gait abnormality with foot drag antalgic gait pattern.,    Pain:  Current 0/10, worst 4/10, best 0/10   Location: right  shoulder & neck  Description: Aching and Dull  Aggravating Factors: random  Easing Factors: nothing    Patients goals: to get better without surgery.     Medical History:   Past Medical History:   Diagnosis Date    Abnormal Pap smear     High grade squamous intraepithelial lesion.    Anxiety     Breast disorder     lumps removed in the past,  bilateral    Cervical nerve root impingement 11/27/2023    Depression     Dysplasia of cervix     s/p CKC    Fatty liver     noted on 1/12 USG    Foraminal stenosis of cervical region 12/06/2012    Foraminal stenosis of lumbar region 11/27/2023    Generalized anxiety disorder 10/17/2012    Generalized headaches     Hyperlipidemia     Increased prolactin level     Manic depressive disorder 10/17/2012    Osteoarthritis of spine with radiculopathy, cervical region 12/06/2012    Pituitary microadenoma with hyperprolactinemia     Vitamin D deficiency        Surgical History:   Zhang Kurtz  has a past surgical history  "that includes Cervical conization w/ laser and Breast biopsy.    Medications:   Zhang has a current medication list which includes the following prescription(s): cyclobenzaprine, ergocalciferol, gabapentin, meloxicam, quetiapine, tramadol, and vibegron.    Allergies:   Review of patient's allergies indicates:   Allergen Reactions    Codeine Nausea Only        Objective      Posture: Left shoulder/hip lower; genu recurvatum  Palpation: increased paracervical muscle tone  Sensation: reports of paresthesia R lateral LE.  Range of Motion/Strength: BUE"s WFL 4/5 gross strength   Cervical ROM Pain/Dysfunction with Movement:   Flexion     50    Extension      30    Right side bending     30    Left side bending     30    Right rotation     50    Left rotation     50      Flexibility: SLR 80 deg bilat  Gait: With AD.  Device Used -  Cane  Analysis: vaulting with right foot drag antalgic gait pattern  Bed Mobility:Independent  Transfers: Independent  Special Tests: Spurling's (-)  Other: 2 minute self paced gait: 200.02 ft  Timed Up and Go  1. 14.80 sec       2. 13.41 sec      3. 14.16 sec       MEAN: 14.12 sec  30 second sit to stand: 10  Single leg stance   R 3.45 sec      Left 1.4 sec  GARCIA Assessment:  45/56  1. Sitting to Standing   4 - able to stand without using hands and stabilize independently  2. Standing Unsupported   4 - able to stand safely 2 minutes without hold  3. Sitting Unsupported   4 - able to sit safely and securely 2 minutes  4. Standing to Sitting   3 - controls descent by using hands  5. Pivot Transfer   3 - able to transfer safely with definite use of hands  6. Standing with Eyes Closed   4 - albe to stand 10 seconds safely  7. Standing with Feet Together   4 - able to place feet together independently and stand 1 minute safely  8. Reaching Forward with Outstretched Arm   3 - can reach forward 12 cm/5 inches safely  9. Retrieving Object from Floor   4 - able to  slipper safely and " easily  10. Turning to Look Behind   4 - looks behind from both sides and weights shifts well  11. Turning 360 Degrees   3 - able to trun 360 safely one side only in 4 seconds or less  12. Placing Alternate Foot on Step   3 - able to stand independently and completely 8 steps > 20 seconds  13. Standing with One Foot in Front   1 - need help to step but can hold 15 seconds  14. Standing on One Foot   1 - tries to lift leg and unable to hold 3 seconds but remains standing independently    Intake Outcome Measure for FOTO neck  Survey    Therapist reviewed FOTO scores for Zhang Kurtz on 4/16/2024.   FOTO report - see Media section or FOTO account episode details.    Intake Score: 43%         Treatment     Total Treatment time (time-based codes) separate from Evaluation: 15 minutes     Zhang received the treatments listed below:      therapeutic exercises to develop strength, endurance, coordination and balance for 15 minutes including:  Sit to stand  Timed up and Go   2 minute walk  Balancing Exercises    Patient Education and Home Exercises     Education provided:   - Discussed New Plan of care.     Written Home Exercises Provided: Patient instructed to cont prior HEP. Exercises were reviewed and Zhang was able to demonstrate them prior to the end of the session.  Zhang demonstrated good  understanding of the education provided. See EMR under Patient Instructions for exercises provided during therapy sessions.    Assessment     Zhang is a 48 y.o. female referred to outpatient Physical Therapy with a medical diagnosis of cervical cord mylomalacia, bulging of cervical disc. Disc disease, degenerative, cervical. Patient presents with abnormal gait, reports of paresthesia, neck and left shoulder pain, perceived weakness and decreased overall mobility    Patient prognosis is Good.   Patient will benefit from skilled outpatient Physical Therapy to address the deficits stated above and in the chart below, provide  patient /family education, and to maximize patientt's level of independence.     Plan of care discussed with patient: Yes  Patient's spiritual, cultural and educational needs considered and patient is agreeable to the plan of care and goals as stated below:     Anticipated Barriers for therapy: none    Medical Necessity is demonstrated by the following  History  Co-morbidities and personal factors that may impact the plan of care [x] LOW: no personal factors / co-morbidities  [] MODERATE: 1-2 personal factors / co-morbidities  [] HIGH: 3+ personal factors / co-morbidities    Moderate / High Support Documentation:   Co-morbidities affecting plan of care: arthritis    Personal Factors:   coping style     Examination  Body Structures and Functions, activity limitations and participation restrictions that may impact the plan of care [x] LOW: addressing 1-2 elements  [] MODERATE: 3+ elements  [] HIGH: 4+ elements (please support below)    Moderate / High Support Documentation: no deficits     Clinical Presentation [x] LOW: stable  [] MODERATE: Evolving  [] HIGH: Unstable     Decision Making/ Complexity Score: low       Goals:  Short Term Goals: 2 weeks   Patient will tolerate traction /decompression.  Patient will have less report of paresthesia    Long Term Goals: 6 weeks   Patient will approximate normal gait pattern with symmetric swing and good floor clearance.  Patient will demonstrate 1.5 mph on treadmill  or 2.0 ft/sec  Patient will demonstrate alternate step on stair maneuvers ascending/descending 15 steps.  Patient will have pain free shoulder neck mobility 0/10 95% of the time.  Plan     Plan of care Certification: 4/16/2024 to 6/8/24.    Outpatient Physical Therapy 2 times weekly for 6 weeks to include the following interventions: Cervical/Lumbar Traction, Gait Training, Manual Therapy, Moist Heat/ Ice, Neuromuscular Re-ed, Therapeutic Activities, and Therapeutic Exercise.     Nomi Casiano  PT        Physician's Signature: _________________________________________ Date: ________________

## 2024-04-18 ENCOUNTER — PATIENT MESSAGE (OUTPATIENT)
Dept: UROGYNECOLOGY | Facility: CLINIC | Age: 49
End: 2024-04-18
Payer: MEDICAID

## 2024-04-18 ENCOUNTER — CLINICAL SUPPORT (OUTPATIENT)
Dept: REHABILITATION | Facility: HOSPITAL | Age: 49
End: 2024-04-18
Payer: MEDICAID

## 2024-04-18 ENCOUNTER — DOCUMENTATION ONLY (OUTPATIENT)
Dept: REHABILITATION | Facility: HOSPITAL | Age: 49
End: 2024-04-18

## 2024-04-18 DIAGNOSIS — M50.30 BULGING OF CERVICAL INTERVERTEBRAL DISC: ICD-10-CM

## 2024-04-18 DIAGNOSIS — M50.30 DISC DISEASE, DEGENERATIVE, CERVICAL: Primary | ICD-10-CM

## 2024-04-18 DIAGNOSIS — G95.89 CERVICAL CORD MYELOMALACIA: ICD-10-CM

## 2024-04-18 PROCEDURE — 97110 THERAPEUTIC EXERCISES: CPT | Mod: PO,CQ

## 2024-04-18 NOTE — PROGRESS NOTES
[facility]  Outpatient Physical Therapy Daily Treatment Note      Name: Zhang Kurtz  Clinic Number: [id number]  Visit Date: 4/18/2024    Therapy Diagnosis:   Encounter Diagnoses   Name Primary?    Disc disease, degenerative, cervical Yes    Cervical cord myelomalacia     Bulging of cervical intervertebral disc        Physician: Tim Crowe MD  Physician Orders: PT Eval and Treat   Medical Diagnosis from Referral:   G95.89 (ICD-10-CM) - Cervical cord myelomalacia   M50.30 (ICD-10-CM) - Bulging of cervical intervertebral disc   M50.30 (ICD-10-CM) - Disc disease, degenerative, cervical      Evaluation Date: 4/16/2024  Authorization Period Expiration: 4/8/25  Plan of Care Expiration: 6/8/24     Visit # / Visits authorized: 1/ 20  FOTO: 43/ 100     Precautions: Standard and Fall      Time In: 1130  Time Out: 1215  Total Billable Time: 45 minutes    Subjective     The patient presents to therapy determined to do well in therapy. No complaints of pain.     Response to previous treatment: good  Functional change: ongoing    Pain: 0/10  Location:       Objective     Zhang received therapeutic exercises to develop strength, endurance, ROM, flexibility, posture, and core stabilization for 45 minutes including:    Nustep level 2 10 minutes   Shuttle 50# DL 3 x 10   Shuttle single leg 32# 2 x 10 each   Standing hip abduction 3 x 10   Standing hip extension 3 x 10   Squats 2 x 10   SportsArc knee extension 3 x 10 11#  SportsArc knee flexion 3 x 10 11#   Bridges 3 x 10   Hooklying add squeezes 5 sec hold 3 x 10   Hooklying hip abduction with OTB 3 x 10       Patient Education and HEP     She was compliant with home exercise program.    Education provided:   - home exercise program     Written Home Exercises Provided: Patient instructed to cont prior HEP.  Exercises were reviewed and Zhang was able to demonstrate them prior to the end of the session.  Zhang  demonstrated good  understanding of the education provided.     See EMR under Patient Instructions for exercises provided prior visit.    Assessment     The patient presented to clinic engaged and willing to participate. She puts forth good effort with all activities. It is hopeful that patient will gain strength and functional ability as she progresses through therapy.     Pt will continue to benefit from skilled outpatient physical therapy to address the deficits listed in the problem list box on initial evaluation, provide pt/family education and to maximize pt's level of independence in the home and community environment.     Zhang Is progressing well towards her goals.   Pt prognosis is Fair.     Pt's spiritual, cultural and educational needs considered and pt agreeable to plan of care and goals.    Anticipated barriers to physical therapy: degenerative diagnosis     Goals: Goals:  Short Term Goals: 2 weeks   Patient will tolerate traction /decompression.  Patient will have less report of paresthesia     Long Term Goals: 6 weeks   Patient will approximate normal gait pattern with symmetric swing and good floor clearance.  Patient will demonstrate 1.5 mph on treadmill  or 2.0 ft/sec  Patient will demonstrate alternate step on stair maneuvers ascending/descending 15 steps.  Patient will have pain free shoulder neck mobility 0/10 95% of the time.    Plan     Continue with the plan of care established per initial evaluation    Plan of care Certification: 4/16/2024 to 6/8/24.     Outpatient Physical Therapy 2 times weekly for 6 weeks    Ashlie Solis PTA

## 2024-04-23 ENCOUNTER — CLINICAL SUPPORT (OUTPATIENT)
Dept: REHABILITATION | Facility: HOSPITAL | Age: 49
End: 2024-04-23
Payer: MEDICAID

## 2024-04-23 DIAGNOSIS — G95.89 CERVICAL CORD MYELOMALACIA: ICD-10-CM

## 2024-04-23 DIAGNOSIS — M50.30 DISC DISEASE, DEGENERATIVE, CERVICAL: Primary | ICD-10-CM

## 2024-04-23 DIAGNOSIS — M50.30 BULGING OF CERVICAL INTERVERTEBRAL DISC: ICD-10-CM

## 2024-04-23 PROCEDURE — 97110 THERAPEUTIC EXERCISES: CPT | Mod: PO,CQ

## 2024-04-23 NOTE — PROGRESS NOTES
[facility]  Outpatient Physical Therapy Daily Treatment Note      Name: Zhang Kurtz  Clinic Number: [id number]  Visit Date: 4/23/2024    Therapy Diagnosis:   Encounter Diagnoses   Name Primary?    Disc disease, degenerative, cervical Yes    Cervical cord myelomalacia     Bulging of cervical intervertebral disc        Physician: Tim Crowe MD  Physician Orders: PT Eval and Treat   Medical Diagnosis from Referral:   G95.89 (ICD-10-CM) - Cervical cord myelomalacia   M50.30 (ICD-10-CM) - Bulging of cervical intervertebral disc   M50.30 (ICD-10-CM) - Disc disease, degenerative, cervical      Evaluation Date: 4/16/2024  Authorization Period Expiration: 4/8/25  Plan of Care Expiration: 6/8/24     Visit # / Visits authorized: 2/ 20  FOTO: 43/ 100     Precautions: Standard and Fall      Time In: 145  Time Out: 230  Total Billable Time: 45 minutes    Subjective     The patient presents to therapy with minimal dependence on cane.     Response to previous treatment: good  Functional change: ongoing    Pain: 0/10  Location:       Objective     Zhang received therapeutic exercises to develop strength, endurance, ROM, flexibility, posture, and core stabilization for 45 minutes including:    Nustep level 2 10 minutes   Shuttle 50# DL 3 x 10   Shuttle single leg 32# 2 x 10 each   Standing hip abduction 3 x 10   Standing hip extension 3 x 10   Squats 2 x 10   SportsArc knee extension 3 x 10 11#  SportsArc knee flexion 3 x 10 11#   Bridges 3 x 10   Hooklying add squeezes 5 sec hold 3 x 10   Hooklying hip abduction with OTB 3 x 10       Patient Education and HEP     She was compliant with home exercise program.    Education provided:   - home exercise program     Written Home Exercises Provided: Patient instructed to cont prior HEP.  Exercises were reviewed and Zhang was able to demonstrate them prior to the end of the session.  Zhang demonstrated good  understanding of  the education provided.     See EMR under Patient Instructions for exercises provided prior visit.    Assessment     The patient presented to clinic engaged and willing to participate. She was able to complete all recommended therapeutic exercises without incident or loss of balance.  It is hopeful that patient will gain strength and functional ability as she progresses through therapy.     Pt will continue to benefit from skilled outpatient physical therapy to address the deficits listed in the problem list box on initial evaluation, provide pt/family education and to maximize pt's level of independence in the home and community environment.     Zhang Is progressing well towards her goals.   Pt prognosis is Fair.     Pt's spiritual, cultural and educational needs considered and pt agreeable to plan of care and goals.    Anticipated barriers to physical therapy: degenerative diagnosis     Goals: Goals:  Short Term Goals: 2 weeks   Patient will tolerate traction /decompression.  Patient will have less report of paresthesia     Long Term Goals: 6 weeks   Patient will approximate normal gait pattern with symmetric swing and good floor clearance.  Patient will demonstrate 1.5 mph on treadmill  or 2.0 ft/sec  Patient will demonstrate alternate step on stair maneuvers ascending/descending 15 steps.  Patient will have pain free shoulder neck mobility 0/10 95% of the time.    Plan     Continue with the plan of care established per initial evaluation    Plan of care Certification: 4/16/2024 to 6/8/24.     Outpatient Physical Therapy 2 times weekly for 6 weeks    Ashlie Solis PTA

## 2024-04-25 ENCOUNTER — CLINICAL SUPPORT (OUTPATIENT)
Dept: REHABILITATION | Facility: HOSPITAL | Age: 49
End: 2024-04-25
Payer: MEDICAID

## 2024-04-25 ENCOUNTER — PATIENT MESSAGE (OUTPATIENT)
Dept: FAMILY MEDICINE | Facility: CLINIC | Age: 49
End: 2024-04-25
Payer: MEDICAID

## 2024-04-25 DIAGNOSIS — M50.30 BULGING OF CERVICAL INTERVERTEBRAL DISC: ICD-10-CM

## 2024-04-25 DIAGNOSIS — M50.30 DISC DISEASE, DEGENERATIVE, CERVICAL: Primary | ICD-10-CM

## 2024-04-25 DIAGNOSIS — G95.89 CERVICAL CORD MYELOMALACIA: ICD-10-CM

## 2024-04-25 PROCEDURE — 97110 THERAPEUTIC EXERCISES: CPT | Mod: PO,CQ

## 2024-04-25 NOTE — PROGRESS NOTES
[facility]  Outpatient Physical Therapy Daily Treatment Note      Name: Zhang Kurtz  Clinic Number: [id number]  Visit Date: 4/25/2024    Therapy Diagnosis:   Encounter Diagnoses   Name Primary?    Disc disease, degenerative, cervical Yes    Cervical cord myelomalacia     Bulging of cervical intervertebral disc        Physician: Tim Crowe MD  Physician Orders: PT Eval and Treat   Medical Diagnosis from Referral:   G95.89 (ICD-10-CM) - Cervical cord myelomalacia   M50.30 (ICD-10-CM) - Bulging of cervical intervertebral disc   M50.30 (ICD-10-CM) - Disc disease, degenerative, cervical      Evaluation Date: 4/16/2024  Authorization Period Expiration: 4/8/25  Plan of Care Expiration: 6/8/24     Visit # / Visits authorized: 3 20  FOTO: 43/ 100     Precautions: Standard and Fall      Time In: 1045  Time Out: 1130  Total Billable Time: 45 minutes    Subjective     The patient presents to therapy without cane or complaints of pain.    Response to previous treatment: good  Functional change: ongoing    Pain: 0/10  Location:       Objective     Zhang received therapeutic exercises to develop strength, endurance, ROM, flexibility, posture, and core stabilization for 45 minutes including:    Nustep level 2 10 minutes   Shuttle 50# DL 3 x 10   Shuttle single leg 32# 2 x 10 each   Standing hip abduction 3 x 10   Standing hip extension 3 x 10   Squats 2 x 10   SportsArc knee extension 3 x 10 11#  SportsArc knee flexion 3 x 10 11#   Bridges 3 x 10   Hooklying add squeezes 5 sec hold 3 x 10   Hooklying hip abduction with OTB 3 x 10  Paloff press GTB 15 x each direction       Patient Education and HEP     She was compliant with home exercise program.    Education provided:   - home exercise program     Written Home Exercises Provided: Patient instructed to cont prior HEP.  Exercises were reviewed and Zhang was able to demonstrate them prior to the end of the session.   Zhang demonstrated good  understanding of the education provided.     See EMR under Patient Instructions for exercises provided prior visit.    Assessment     The patient presented to clinic engaged and willing to participate. She was able to complete all recommended therapeutic exercises without incident or loss of balance.  It is hopeful that patient will gain strength and functional ability as she progresses through therapy.     Pt will continue to benefit from skilled outpatient physical therapy to address the deficits listed in the problem list box on initial evaluation, provide pt/family education and to maximize pt's level of independence in the home and community environment.     Zhang Is progressing well towards her goals.   Pt prognosis is Fair.     Pt's spiritual, cultural and educational needs considered and pt agreeable to plan of care and goals.    Anticipated barriers to physical therapy: degenerative diagnosis     Goals: Goals:  Short Term Goals: 2 weeks   Patient will tolerate traction /decompression.  Patient will have less report of paresthesia     Long Term Goals: 6 weeks   Patient will approximate normal gait pattern with symmetric swing and good floor clearance.  Patient will demonstrate 1.5 mph on treadmill  or 2.0 ft/sec  Patient will demonstrate alternate step on stair maneuvers ascending/descending 15 steps.  Patient will have pain free shoulder neck mobility 0/10 95% of the time.    Plan     Continue with the plan of care established per initial evaluation    Plan of care Certification: 4/16/2024 to 6/8/24.     Outpatient Physical Therapy 2 times weekly for 6 weeks    Ashlie Solis PTA

## 2024-04-26 ENCOUNTER — TELEPHONE (OUTPATIENT)
Dept: PSYCHIATRY | Facility: CLINIC | Age: 49
End: 2024-04-26
Payer: MEDICAID

## 2024-05-01 ENCOUNTER — CLINICAL SUPPORT (OUTPATIENT)
Dept: REHABILITATION | Facility: HOSPITAL | Age: 49
End: 2024-05-01
Payer: MEDICAID

## 2024-05-01 DIAGNOSIS — M50.30 BULGING OF CERVICAL INTERVERTEBRAL DISC: ICD-10-CM

## 2024-05-01 DIAGNOSIS — G95.89 CERVICAL CORD MYELOMALACIA: ICD-10-CM

## 2024-05-01 DIAGNOSIS — M50.30 DISC DISEASE, DEGENERATIVE, CERVICAL: Primary | ICD-10-CM

## 2024-05-01 PROCEDURE — 97110 THERAPEUTIC EXERCISES: CPT | Mod: PO

## 2024-05-01 NOTE — PROGRESS NOTES
[facility]  Outpatient Physical Therapy Daily Treatment Note      Name: Zhang Kurtz  Clinic Number: [id number]  Visit Date: 5/1/2024    Therapy Diagnosis:   Encounter Diagnoses   Name Primary?    Disc disease, degenerative, cervical Yes    Cervical cord myelomalacia     Bulging of cervical intervertebral disc        Physician: Tim Crowe MD  Physician Orders: PT Eval and Treat   Medical Diagnosis from Referral:   G95.89 (ICD-10-CM) - Cervical cord myelomalacia   M50.30 (ICD-10-CM) - Bulging of cervical intervertebral disc   M50.30 (ICD-10-CM) - Disc disease, degenerative, cervical      Evaluation Date: 4/16/2024  Authorization Period Expiration: 4/8/25  Plan of Care Expiration: 6/8/24     Visit # / Visits authorized: 1/1 (5)  FOTO: 43/ 100     Precautions: Standard and Fall      Time In: 1045  Time Out: 1130  Total Billable Time: 45 minutes    Subjective     Patient reports no new concerns.  Compliant with home exercises   Response to previous treatment: tolerated Rx  Functional change: able to regulate uniform speed of gait on treadmill.    Pain: 0/10  Location: none indicated      Objective     Zhang received therapeutic exercises to develop strength, endurance, ROM, flexibility, posture, and core stabilization for 45 minutes including:  Nustep L4 all 4's 10'  Gastroc stretches 2'  Quad sets 20/20  Hamstring curls manual resist 20/20  Ankle dorisflexion manual resist 20/20  Ambulated without cane emphasis on symmetric sequence and leveled posture.  Treadmill 1.5 mph x 5'    Patient Education and HEP     Education provided:   - consistent floor clearance during gait.     Written Home Exercises Provided: Patient instructed to cont prior HEP.  Exercises were reviewed and Zhang was able to demonstrate them prior to the end of the session.  Zhang demonstrated good  understanding of the education provided.     See EMR under Patient Instructions for  exercises provided prior visit.    Assessment     Charlene demonstrated symmetric swing/stance during treadmill with occasional foot scoff R>L.  Tolerated Rx well.    Pt will continue to benefit from skilled outpatient physical therapy to address the deficits listed in the problem list box on initial evaluation, provide pt/family education and to maximize pt's level of independence in the home and community environment.     Zhang Is progressing well towards her goals.   Pt prognosis is Fair.     Pt's spiritual, cultural and educational needs considered and pt agreeable to plan of care and goals.    Anticipated barriers to physical therapy: degenerative diagnosis     Goals: Goals:  Short Term Goals: 2 weeks   Patient will tolerate traction /decompression.  (Not started)  Patient will have less report of paresthesia   (ongoing)     Long Term Goals: 6 weeks   Patient will approximate normal gait pattern with symmetric swing and good floor clearance. (Ongoing)  Patient will demonstrate 1.5 mph on treadmill  or 2.0 ft/sec  (partially met)  Patient will demonstrate alternate step on stair maneuvers ascending/descending 15 steps.  (Ongoing)  Patient will have pain free shoulder neck mobility 0/10 95% of the time.  (Ongoing)    Plan     Strengthening  'Gait training, coordination  Endurance.  Continue with the Updated plan of care     Nomi Casiano, PT

## 2024-05-06 ENCOUNTER — CLINICAL SUPPORT (OUTPATIENT)
Dept: REHABILITATION | Facility: HOSPITAL | Age: 49
End: 2024-05-06
Payer: MEDICAID

## 2024-05-06 DIAGNOSIS — G95.89 CERVICAL CORD MYELOMALACIA: ICD-10-CM

## 2024-05-06 DIAGNOSIS — M50.30 DISC DISEASE, DEGENERATIVE, CERVICAL: Primary | ICD-10-CM

## 2024-05-06 DIAGNOSIS — M50.30 BULGING OF CERVICAL INTERVERTEBRAL DISC: ICD-10-CM

## 2024-05-06 PROCEDURE — 97110 THERAPEUTIC EXERCISES: CPT | Mod: PO

## 2024-05-06 NOTE — PROGRESS NOTES
[facility]  Outpatient Physical Therapy Daily Treatment Note      Name: Zhang Kurtz  Clinic Number: [id number]  Visit Date: 5/6/2024    Therapy Diagnosis:   Encounter Diagnoses   Name Primary?    Disc disease, degenerative, cervical Yes    Cervical cord myelomalacia     Bulging of cervical intervertebral disc        Physician: Tim Crowe MD  Physician Orders: PT Eval and Treat   Medical Diagnosis from Referral:   G95.89 (ICD-10-CM) - Cervical cord myelomalacia   M50.30 (ICD-10-CM) - Bulging of cervical intervertebral disc   M50.30 (ICD-10-CM) - Disc disease, degenerative, cervical      Evaluation Date: 4/16/2024  Authorization Period Expiration: 4/8/25  Plan of Care Expiration: 6/8/24     Visit # / Visits authorized: 1/1 (5)     Precautions: Standard and Fall      Time In: 0915  Time Out: 1000  Total Billable Time: 45 minutes    Subjective     Patient reports getting tired with long walks.  Compliant with home exercises   Response to previous treatment: tolerated Rx  Functional change: increased Right  knee flexion during ambulation activities on treadmill.      Pain: 0/10  Location: none indicated      Objective     Zhang received therapeutic exercises to develop strength, endurance, ROM, flexibility, posture, and core stabilization for 45 minutes including:  Nustep L4 all 4's 10'  Shutlle press Gastroc stretches 2'  Shuttle press heel raises 75 #s 20  Shuttle press mini squats 75 #s 20  Standing hip Abduction 2#s ankle weights alternate 20/20  High marching sol in place 2#s ankle weights 20/20  Weight shifting with blazepod target unilateral and altrenate  30 second x 6 trials.  Treadmill 1.0 mph x 7'    Patient Education and HEP     Education provided:   Symmetric swing and erect posture .    Written Home Exercises Provided: Patient instructed to cont prior HEP.  Exercises were reviewed and Zhang was able to demonstrate them prior to the end of the session.  Zhang  demonstrated good  understanding of the education provided.     See EMR under Patient Instructions for exercises provided prior visit.    Assessment     Charlene manifesting poor gait patterns and right leg stiffness with no complaints to the UE's or neck.  Treatment approaches had been as symptoms persist. Improve gait ability with treadmill regulated speed.  Tolerated Rx well.    Pt will continue to benefit from skilled outpatient physical therapy to address the deficits listed in the problem list box on initial evaluation, provide pt/family education and to maximize pt's level of independence in the home and community environment.     Zhang Is progressing well towards her goals.   Pt prognosis is Fair.     Pt's spiritual, cultural and educational needs considered and pt agreeable to plan of care and goals.    Anticipated barriers to physical therapy: degenerative diagnosis     Goals: Goals:  Short Term Goals: 2 weeks   Patient will tolerate traction /decompression.  (Not started)  Patient will have less report of paresthesia   (ongoing)     Long Term Goals: 6 weeks   Patient will approximate normal gait pattern with symmetric swing and good floor clearance. (Ongoing)  Patient will demonstrate 1.5 mph on treadmill  or 2.0 ft/sec  (partially met)  Patient will demonstrate alternate step on stair maneuvers ascending/descending 15 steps.  (Ongoing)  Patient will have pain free shoulder neck mobility 0/10 95% of the time.  (Ongoing)    Plan     Will start cervical decompression on traction  Strengthening  'Gait training, coordination  Endurance.  Continue with the Updated plan of care     Nomi Casiano, PT

## 2024-05-08 ENCOUNTER — CLINICAL SUPPORT (OUTPATIENT)
Dept: REHABILITATION | Facility: HOSPITAL | Age: 49
End: 2024-05-08
Payer: MEDICAID

## 2024-05-08 DIAGNOSIS — M50.30 BULGING OF CERVICAL INTERVERTEBRAL DISC: ICD-10-CM

## 2024-05-08 DIAGNOSIS — G95.89 CERVICAL CORD MYELOMALACIA: ICD-10-CM

## 2024-05-08 DIAGNOSIS — M50.30 DISC DISEASE, DEGENERATIVE, CERVICAL: Primary | ICD-10-CM

## 2024-05-08 PROCEDURE — 97110 THERAPEUTIC EXERCISES: CPT | Mod: PO

## 2024-05-08 NOTE — PROGRESS NOTES
[facility]  Outpatient Physical Therapy Daily Treatment Note      Name: Zhang Kurtz  Clinic Number: [id number]  Visit Date: 5/8/2024    Therapy Diagnosis:   Encounter Diagnoses   Name Primary?    Disc disease, degenerative, cervical Yes    Cervical cord myelomalacia     Bulging of cervical intervertebral disc        Physician: Tim Crowe MD  Physician Orders: PT Eval and Treat   Medical Diagnosis from Referral:   G95.89 (ICD-10-CM) - Cervical cord myelomalacia   M50.30 (ICD-10-CM) - Bulging of cervical intervertebral disc   M50.30 (ICD-10-CM) - Disc disease, degenerative, cervical      Evaluation Date: 4/16/2024  Authorization Period Expiration: 4/8/25  Plan of Care Expiration: 6/8/24     Visit # / Visits authorized: 7/10 (10)     Precautions: Standard and Fall      Time In: 1045  Time Out: 1130  Total Billable Time: 45 minutes    Subjective     Patient reports no neck pains or UE weakness..  Compliant with home exercises   Response to previous treatment: tolerated Rx  Functional change:none reported       Pain: 0/10  Location: none indicated      Objective     Zhang received therapeutic exercises to develop strength, endurance, ROM, flexibility, posture, and core stabilization for 45 minutes including:  Nustep L4 all 4's 10'  Arms overhead active stretch  Shoulder shrugs 20  Scapular retractions 20  Shoulder circles 20/10 ccw/cw  Received supervised intermittent mechanical cervial traction 20#s 40 second  hold  5#s 10 second rest x 15 minutes.  MHP to paracervicals during traction treatment.    Patient Education and HEP     Education provided:   Reviewed cervical approach to alleviate disc bulge with decompression/traction.    Written Home Exercises Provided: Patient instructed to cont prior HEP.  Exercises were reviewed and Zhang was able to demonstrate them prior to the end of the session.  Zhang demonstrated good  understanding of the education provided.     See EMR under  Patient Instructions for exercises provided prior visit.    Assessment     Charlene has no neck ROM limitations or UE paresthesia. Cervical traction approach from imaging of disc protrusions and from medical diagnosis for referral.  Tolerated Rx well.    Pt will continue to benefit from skilled outpatient physical therapy to address the deficits listed in the problem list box on initial evaluation, provide pt/family education and to maximize pt's level of independence in the home and community environment.     Zhang Is progressing well towards her goals.   Pt prognosis is Fair.     Pt's spiritual, cultural and educational needs considered and pt agreeable to plan of care and goals.    Anticipated barriers to physical therapy: degenerative diagnosis     Goals: Goals:  Short Term Goals:   Patient will tolerate traction /decompression.  (met)  Patient will have less report of paresthesia   (met)     Long Term Goals:    Patient will approximate normal gait pattern with symmetric swing and good floor clearance. (Ongoing)  Patient will demonstrate 1.5 mph on treadmill  or 2.0 ft/sec  (partially met)  Patient will demonstrate alternate step on stair maneuvers ascending/descending 15 steps.  (Ongoing)  Patient will have pain free shoulder neck mobility 0/10 95% of the time.  (Ongoing)    Plan     cervical decompression/traction  Neck ROM  Functional mobility  Continue  plan of care     Nomi Casiano, PT

## 2024-05-14 ENCOUNTER — CLINICAL SUPPORT (OUTPATIENT)
Dept: REHABILITATION | Facility: HOSPITAL | Age: 49
End: 2024-05-14
Payer: MEDICAID

## 2024-05-14 DIAGNOSIS — G95.89 CERVICAL CORD MYELOMALACIA: ICD-10-CM

## 2024-05-14 DIAGNOSIS — M50.30 DISC DISEASE, DEGENERATIVE, CERVICAL: Primary | ICD-10-CM

## 2024-05-14 DIAGNOSIS — M50.30 BULGING OF CERVICAL INTERVERTEBRAL DISC: ICD-10-CM

## 2024-05-14 PROCEDURE — 97110 THERAPEUTIC EXERCISES: CPT | Mod: PO

## 2024-05-14 NOTE — PROGRESS NOTES
"[facility]  Outpatient Physical Therapy Daily Treatment Note      Name: Zhang Kurtz  Clinic Number: [id number]  Visit Date: 5/14/2024    Therapy Diagnosis:   Encounter Diagnoses   Name Primary?    Disc disease, degenerative, cervical Yes    Cervical cord myelomalacia     Bulging of cervical intervertebral disc        Physician: Tim Crowe MD  Physician Orders: PT Eval and Treat   Medical Diagnosis from Referral:   G95.89 (ICD-10-CM) - Cervical cord myelomalacia   M50.30 (ICD-10-CM) - Bulging of cervical intervertebral disc   M50.30 (ICD-10-CM) - Disc disease, degenerative, cervical      Evaluation Date: 4/16/2024  Authorization Period Expiration: 4/8/25  Plan of Care Expiration: 6/8/24     Visit # / Visits authorized: 8/10 (11)     Precautions: Standard and Fall      Time In: 1300  Time Out: 1345  Total Billable Time: 45 minutes    Subjective     Patient reports no neck pains or UE weakness..  Compliant with home exercises   Response to previous treatment: tolerated Rx  Functional change:none reported       Pain: 0/10  Location: none indicated      Objective     Zhang received therapeutic exercises to develop strength, endurance, ROM, flexibility, posture, and core stabilization for 45 minutes including:  Nustep L4 all 4's 10'  Gastroc stretches standing on half roll  Step ups 6" 20/20  Step through 6" 20/20  Sidesteps 20/20  Shoulder shrugs 10  Scapular retractions 10  Shoulder circles 20/10 ccw/cw  Received supervised intermittent mechanical cervial traction 23#s 30 second  hold  5#s 10 second rest x 15 minutes.  MHP to paracervicals during traction treatment.    Patient Education and HEP     Education provided:   Discussed discharge plans.    Written Home Exercises Provided: Patient instructed to cont prior HEP.  Exercises were reviewed and Zhang was able to demonstrate them prior to the end of the session.  Zhang demonstrated good  understanding of the education provided. "     See EMR under Patient Instructions for exercises provided prior visit.    Assessment     Charlene able to do high marches and wide steps during exercises but ambulates with right leg minimal flexion and small amplitude on swing. Continued .Cervical decompression as per referral diagnosis.  Tolerated Rx well.    Pt will continue to benefit from skilled outpatient physical therapy to address the deficits listed in the problem list box on initial evaluation, provide pt/family education and to maximize pt's level of independence in the home and community environment.     Zhang Is progressing well towards her goals.   Pt prognosis is Fair.     Pt's spiritual, cultural and educational needs considered and pt agreeable to plan of care and goals.    Anticipated barriers to physical therapy: degenerative diagnosis     Goals: Goals:  Short Term Goals:   Patient will tolerate traction /decompression.  (met)  Patient will have less report of paresthesia   (met)     Long Term Goals:    Patient will approximate normal gait pattern with symmetric swing and good floor clearance. (Ongoing)  Patient will demonstrate 1.5 mph on treadmill  or 2.0 ft/sec  (partially met)  Patient will demonstrate alternate step on stair maneuvers ascending/descending 15 steps.  (Ongoing)  Patient will have pain free shoulder neck mobility 0/10 95% of the time.  (met)    Plan     Discharge Plans     Nomi Casiano, PT

## 2024-05-16 ENCOUNTER — CLINICAL SUPPORT (OUTPATIENT)
Dept: REHABILITATION | Facility: HOSPITAL | Age: 49
End: 2024-05-16
Payer: MEDICAID

## 2024-05-16 DIAGNOSIS — G95.89 CERVICAL CORD MYELOMALACIA: ICD-10-CM

## 2024-05-16 DIAGNOSIS — M50.30 BULGING OF CERVICAL INTERVERTEBRAL DISC: ICD-10-CM

## 2024-05-16 DIAGNOSIS — M50.30 DISC DISEASE, DEGENERATIVE, CERVICAL: Primary | ICD-10-CM

## 2024-05-16 PROCEDURE — 97110 THERAPEUTIC EXERCISES: CPT | Mod: PO

## 2024-05-16 NOTE — PROGRESS NOTES
[facility]  Physical Therapy Discharge Note    Name: Zhang Kurtz  Clinic Number: [id number]    Therapy Diagnosis:   Encounter Diagnoses   Name Primary?    Disc disease, degenerative, cervical Yes    Cervical cord myelomalacia     Bulging of cervical intervertebral disc      Physician: Tim Crowe MD    Physician Orders: PT Eval and treat  Medical Diagnosis:   G95.89 (ICD-10-CM) - Cervical cord myelomalacia   M50.30 (ICD-10-CM) - Bulging of cervical intervertebral disc   M50.30 (ICD-10-CM) - Disc disease, degenerative, cervical     Evaluation Date: 4/16/24      Date of Last visit: 5/16/24  Total Visits Received: 12    Today's Visit:  Time IN: 1051  Time Out: 1130  Total time in minutes: 39    Treatment: Thera Ex 45  Includes:  Stair maneuvers ascending /descending 15 alternate steps.  Gaitbetter 1.5 mph with virtual hurdles x 10 minutes.  Intermittent mechanical cervical traction 22#s 40 sec hold;  5#s 10 sec rest x 15 minutes  MHP while on traction set up.  Reviewed Gait correction and home program.    ASSESSMENT      Patient seen x 6 weeks follow up from previous visits. Cervical diagnosis with gait abnomality manifestations. Receiving global treatment and making good progress. Able to correct gait but spastic right leg exhibits habitual vaulting and lack of knee flexion on swing gait pattern.    Discharge reason: Patient has completed allowable visits authorized by insurance    Discharge FOTO Score: 36/100    Goals: Short Term Goals:   Patient will tolerate traction /decompression.  (Met)  Patient will have less report of paresthesia   (met)     Long Term Goals: 6 weeks   Patient will approximate normal gait pattern with symmetric swing and good floor clearance.  (Unmet-inconsistent)  Patient will demonstrate 1.5 mph on treadmill  or 2.0 ft/sec   (met)  Patient will demonstrate alternate step on stair maneuvers ascending/descending 15 steps.   (Met)  Patient will have pain free  Physical Therapy Daily Treatment and Discharge Summary       Visit Count: 18 -progress note due 19th  Plan of Care/Certification Dates: Initial: 3/28/2017 Through: 6/27/2017  Insurance Information: follow medicare guidelines  Next Referring Provider Visit: 5/23/17     Referred by: Dr. Ousmane Brambila MD  Medical Diagnosis (from order): S/P right knee surgery [Z98.890]   Treatment Diagnosis: Knee Symptoms with Pain, Impaired Posture, Impaired Joint Mobility, Impaired Range of Motion, Impaired Motor Function/Muscle Performance, Impaired Gait/Locomotion Deficits, Impaired Mobility and Impaired Balance  Insurance: 1. [facility] MEDICARE COMPLETE 2. N/A     Date of Surgery: 3/6/17; surgery performed: right TKA; rehabilitation guidelines: no  Diagnosis Precautions: none  Relevant co-morbidities and medications: Vitamin D deficiency; OA; obesity; hyperglycemia; hypercholesterolemia; HTN; DVT (2002); DJD; CAD; BPH   Relevant Tests: None      Medical/surgical history, medications and relevant tests have been reviewed.     Per MD note on 3/6/17: \"Patient underwent a right total knee replacement performed by Dr. Ousmane Brambila on 3/6/17. He tolerated the procedure well. They were subsequently transferred to [facility] surgical floor where DVT prophylaxis was initiated with Lovenox 12 hrs post op and Coumadin was started on postoperative day #1. Pain control was established. Physical therapy progressed and when passed PT was clear for d/c home. \"    SUBJECTIVE   Hamstring/tightness today  Current Pain: 1.5/10.    Functional Change: Pt independent with HEP. He is doing great at home functionally.     OBJECTIVE   Posture/Observation:  Well healed incision     Gait Analysis:  Presents with no AD, no antalgia present    Range of Motion (degrees): Knee Active Range of Motion  [] All motions within functional/normal limits except those noted.   [] Only those motions that were assessed are noted.   [] Uninvolved motion within functional/normal  limits.    Norm Left Right Involved   Date   Initial Initial 6/15/2017     Flexion 135 122 ° AROM/ 127 ° PROM 87 ° * AROM/ 92 ° PROM * 113 AAROM  107 AROM   Extension 0-5 -2 ° AROM/ -2 PROM 14 ° AROM * -5   [standard testing positions unless otherwise noted]   Comments: * denotes pain    Strength: (out of 5)     Left Right Involved   KNEE       6/15/2017      Flexion 5/5 4+/5 5/5   Extension 5/5 4+/5         5/5      Balance:  single leg R 3 seconds    Outcome Measures:   Lower Extremity Functional Scale: LEFS Calculated Total: 57 (0=extreme difficulty; 80=no difficulty)     Treatment   Therapeutic Exercise:   Recumbent bike, seat position 11 scifit x8min- pt able to make full revolutions  Staircase knee flexion/ext stretching 30\" intervals  Reassessment- see above for details, LEFS completion  Education on importance of continuing home exercises for further improvement in knee ROM and strength. We did discuss avoiding overuse to prevent injuries as well.     Current Home Program (not performed this date except as noted above): * = added today  Standing hip abduction, extension, flexion bilaterally  Heel Slides  Seated hamstring stretch  Straight leg raises  Continue with HEP from home therapy  Quad Sets with dorsiflexion with heel prop x 10, 5 second hold  Heel slides seated with 5 second holds x 10  use range that he gains with functional activities: i.e. Sit to stands  *Bridges     ASSESSMENT   Pt has progressed very well over course of last 4 weeks in PT. He has met almost all goals from initial POC apart from ROM and LEFS score. His LEFS score will be limited by fact he cannot run, jump, or hop. His ROM has improved significantly since initial plan of care and he has struggled with scar tissue and quad tightness throughout rehab. He may continue to gain more ROM moving forward but I do not anticipate he will get 130 degrees of knee flexion. He may achieve full knee extension with continued home  shoulder neck mobility 0/10 95% of the time.   (Met)    PLAN   This patient is discharged from Physical Therapy  Follow up with ortho MD.  Continue self correction and strengthening Ex.    Nomi Casiano, PT       exercises/stretching. He is ready for dc at this point in time.   Pain after treatment: 0/10  Result of above outlined education: Verbalizes understanding and Demonstrates understanding    Goals:       To be obtained by end of this plan of care:  1. Patient independent with modified and progressed home exercise program. Met  2. Patient will decrease involved knee pain to 0/10 at worst to aid in ambulating on level and unlevel surfaces. Met  3. Patient will increase involved knee active range of motion to 0-130° to aid in completing transfers including low and soft surfaces. Not met (-5-107)  4. Patient will increase involved knee/hip strength to 5/5 to aid in reciprocal stair ambulation. Met  5. Patient will be able to ambulate modified independent/independent with no assistive device for 45 minutes without  pain/difficulty to improve function in grocery shopping, ambulate to physician appointments and community interaction. Met   6. Patient will be able to ambulate 12 steps independently for access to bedroom on 2nd level without  Pain/difficulty. Met   7. Improve single leg standing balance to 30 seconds to improve ability to amb on level and unlevel surfaces to improve function in community interaction and reduce risk for falls. Met  8. Patient will be able to sleep 6 hours without disruption from pain. Met  9. Lower Extremity Functional Scale: Patient will complete form to reflect an improved score from initial score of 37 to greater than or equal to 70 (0=extreme difficulty; 80=no difficulty) to indicate pt reported improvement in function/disability/impairment (minimal detectable change: 9 points). Not met (57/80)       PLAN   Discharge to [facility]     THERAPY DAILY BILLING   Primary Insurance: [facility] MEDICARE COMPLETE  Secondary Insurance: N/A    Evaluation Procedures:  No evaluation codes were used on this date of service    Timed Procedures:  Therapeutic Exercise, 40 minutes    Untimed Procedures:  No untimed  codes were used on this date of service    Total Treatment Time: 40 minutes    The referring provider's electronic or written signature on the evaluation authorizes the therapy plan of care and certifies the need for these services, furnished under this plan of care while under their care.  Electronically sent for physician signature    Discharge Measures:   Total Number of Visits: 18  Surgery Date: 3/6/2017  Treatment Category: Total Knee Replacement  Outcome Measure:  Lower Extremity Functional Scale   Initial Outcome Score:  37   Discharge Score Error: None  Discharge Outcome Score: 57  Primary Clinician: Carlos Purdy  Subjective Percent Improvement With Therapy: 95

## 2024-06-10 ENCOUNTER — PATIENT MESSAGE (OUTPATIENT)
Dept: ORTHOPEDICS | Facility: CLINIC | Age: 49
End: 2024-06-10

## 2024-06-10 ENCOUNTER — OFFICE VISIT (OUTPATIENT)
Dept: ORTHOPEDICS | Facility: CLINIC | Age: 49
End: 2024-06-10
Payer: MEDICAID

## 2024-06-10 VITALS
DIASTOLIC BLOOD PRESSURE: 80 MMHG | WEIGHT: 121.06 LBS | BODY MASS INDEX: 25.41 KG/M2 | HEIGHT: 58 IN | SYSTOLIC BLOOD PRESSURE: 126 MMHG

## 2024-06-10 DIAGNOSIS — M48.061 FORAMINAL STENOSIS OF LUMBAR REGION: ICD-10-CM

## 2024-06-10 DIAGNOSIS — G95.89 CERVICAL CORD MYELOMALACIA: Primary | ICD-10-CM

## 2024-06-10 DIAGNOSIS — M50.30 DISC DISEASE, DEGENERATIVE, CERVICAL: ICD-10-CM

## 2024-06-10 DIAGNOSIS — M48.02 STENOSIS, CERVICAL SPINE: ICD-10-CM

## 2024-06-10 DIAGNOSIS — M50.30 BULGING OF CERVICAL INTERVERTEBRAL DISC: ICD-10-CM

## 2024-06-10 DIAGNOSIS — G54.2 CERVICAL NERVE ROOT IMPINGEMENT: ICD-10-CM

## 2024-06-10 DIAGNOSIS — M47.22 OSTEOARTHRITIS OF SPINE WITH RADICULOPATHY, CERVICAL REGION: ICD-10-CM

## 2024-06-10 DIAGNOSIS — M48.02 FORAMINAL STENOSIS OF CERVICAL REGION: ICD-10-CM

## 2024-06-10 PROCEDURE — 3008F BODY MASS INDEX DOCD: CPT | Mod: CPTII,S$GLB,, | Performed by: ORTHOPAEDIC SURGERY

## 2024-06-10 PROCEDURE — 99213 OFFICE O/P EST LOW 20 MIN: CPT | Mod: S$GLB,,, | Performed by: ORTHOPAEDIC SURGERY

## 2024-06-10 PROCEDURE — 3079F DIAST BP 80-89 MM HG: CPT | Mod: CPTII,S$GLB,, | Performed by: ORTHOPAEDIC SURGERY

## 2024-06-10 PROCEDURE — 3074F SYST BP LT 130 MM HG: CPT | Mod: CPTII,S$GLB,, | Performed by: ORTHOPAEDIC SURGERY

## 2024-06-10 PROCEDURE — 1159F MED LIST DOCD IN RCRD: CPT | Mod: CPTII,S$GLB,, | Performed by: ORTHOPAEDIC SURGERY

## 2024-06-10 PROCEDURE — 1160F RVW MEDS BY RX/DR IN RCRD: CPT | Mod: CPTII,S$GLB,, | Performed by: ORTHOPAEDIC SURGERY

## 2024-06-10 RX ORDER — CEFAZOLIN SODIUM 2 G/50ML
2 SOLUTION INTRAVENOUS
OUTPATIENT
Start: 2024-06-10

## 2024-06-10 RX ORDER — MELOXICAM 15 MG/1
15 TABLET ORAL DAILY
Qty: 30 TABLET | Refills: 0 | Status: SHIPPED | OUTPATIENT
Start: 2024-06-10

## 2024-06-10 RX ORDER — TIZANIDINE 4 MG/1
4 TABLET ORAL EVERY 8 HOURS
Qty: 90 TABLET | Refills: 0 | Status: SHIPPED | OUTPATIENT
Start: 2024-06-10 | End: 2024-07-10

## 2024-06-10 RX ORDER — TRAMADOL HYDROCHLORIDE 50 MG/1
50 TABLET ORAL EVERY 6 HOURS PRN
Qty: 28 TABLET | Refills: 2 | Status: SHIPPED | OUTPATIENT
Start: 2024-06-10

## 2024-06-10 RX ORDER — METHOCARBAMOL 750 MG/1
TABLET, FILM COATED ORAL
COMMUNITY
Start: 2024-04-28 | End: 2024-06-10

## 2024-06-10 NOTE — PROGRESS NOTES
Subjective:       Patient ID: Zhang Kurtz is a 48 y.o. female.    Chief Complaint: Pain of the Neck (Patient is here for a 2 month f/u on cervical pain. States pain has stayed about the same since last visit with slight improvement. Has numbness and tingling that radiates down through both arms and legs. )      History of Present Illness    Prior to meeting with the patient I reviewed the medical chart in [facility]. This included reviewing the previous progress notes from our office, review of the patient's last appointment with their primary care provider, review of any visits to the emergency room, and review of any pain management appointments or procedures.   Zhang comes in today for follow-up for her neck, arms, and legs.  She has known cervical central canal stenosis cervical myelomalacia.  This is proven on previous MRI.  We have discussed surgical intervention with her on previous visits.  She has been working with PT to try to maintain strength.  She has requiring the assistance of a single prong cane for ambulation.  She does complain of right greater than left lower extremity symptoms.  She has spasms in her legs.  No bowel or bladder incontinence    Current Medications  Current Outpatient Medications   Medication Sig Dispense Refill    cyclobenzaprine (FLEXERIL) 5 MG tablet Take 1 tablet (5 mg total) by mouth 3 (three) times daily as needed for Muscle spasms. 30 tablet 0    ergocalciferol (ERGOCALCIFEROL) 50,000 unit Cap Take 1 capsule (50,000 Units total) by mouth every 7 days. 12 capsule 3    gabapentin (NEURONTIN) 300 MG capsule Take 300 mg by mouth 3 (three) times daily.      meloxicam (MOBIC) 15 MG tablet Take 1 tablet (15 mg total) by mouth once daily. 30 tablet 0    QUEtiapine (SEROQUEL) 50 MG tablet Take 1 tablet (50 mg total) by mouth every evening. 30 tablet 1    traMADoL (ULTRAM) 50 mg tablet Take 1 tablet (50 mg total) by mouth every 6 (six) hours as needed for Pain. 28 tablet 2    vibegron 75  mg Tab Take 1 tablet (75 mg total) by mouth once daily. 30 tablet 6    methocarbamoL (ROBAXIN) 750 MG Tab  (Patient not taking: Reported on 6/10/2024)       No current facility-administered medications for this visit.       Allergies  Review of patient's allergies indicates:   Allergen Reactions    Codeine Nausea Only       Past Medical History  Past Medical History:   Diagnosis Date    Abnormal Pap smear     High grade squamous intraepithelial lesion.    Anxiety     Breast disorder     lumps removed in the past,  bilateral    Cervical nerve root impingement 11/27/2023    Depression     Dysplasia of cervix     s/p CKC    Fatty liver     noted on 1/12 USG    Foraminal stenosis of cervical region 12/06/2012    Foraminal stenosis of lumbar region 11/27/2023    Generalized anxiety disorder 10/17/2012    Generalized headaches     Hyperlipidemia     Increased prolactin level     Manic depressive disorder 10/17/2012    Osteoarthritis of spine with radiculopathy, cervical region 12/06/2012    Pituitary microadenoma with hyperprolactinemia     Vitamin D deficiency        Surgical History  Past Surgical History:   Procedure Laterality Date    BREAST BIOPSY      bening    CERVICAL CONIZATION   W/ LASER         Family History:   Family History   Problem Relation Name Age of Onset    Cancer Mother          lymph node CA    Asthma Daughter      Anesthesia problems Neg Hx      Clotting disorder Neg Hx         Social History:   Social History     Socioeconomic History    Marital status: Single    Number of children: 2   Occupational History    Occupation: administrative coordinatory   Tobacco Use    Smoking status: Every Day     Current packs/day: 1.00     Average packs/day: 1 pack/day for 10.0 years (10.0 ttl pk-yrs)     Types: Cigars, Cigarettes    Smokeless tobacco: Never    Tobacco comments:     cigars   Substance and Sexual Activity    Alcohol use: Yes     Alcohol/week: 1.0 standard drink of alcohol     Types: 1 Standard  drinks or equivalent per week    Drug use: No    Sexual activity: Yes     Partners: Male     Birth control/protection: None   Social History Narrative    ** Merged History Encounter **          Social Determinants of Health     Financial Resource Strain: High Risk (12/27/2023)    Overall Financial Resource Strain (CARDIA)     Difficulty of Paying Living Expenses: Very hard   Food Insecurity: Food Insecurity Present (12/27/2023)    Hunger Vital Sign     Worried About Running Out of Food in the Last Year: Often true     Ran Out of Food in the Last Year: Often true   Transportation Needs: No Transportation Needs (12/27/2023)    PRAPARE - Transportation     Lack of Transportation (Medical): No     Lack of Transportation (Non-Medical): No   Physical Activity: Inactive (12/27/2023)    Exercise Vital Sign     Days of Exercise per Week: 0 days     Minutes of Exercise per Session: 0 min   Stress: Stress Concern Present (12/27/2023)    [employer] - Occupational Stress Questionnaire     Feeling of Stress : Very much   Housing Stability: High Risk (12/27/2023)    Housing Stability Vital Sign     Unable to Pay for Housing in the Last Year: Yes     Number of Places Lived in the Last Year: 1     Unstable Housing in the Last Year: No       Hospitalization/Major Diagnostic Procedure:     Review of Systems     General/Constitutional:  Chills denies. Fatigue denies. Fever denies. Weight gain denies. Weight loss denies.    Respiratory:  Shortness of breath denies.    Cardiovascular:  Chest pain denies.    Gastrointestinal:  Constipation denies. Diarrhea denies. Nausea denies. Vomiting denies.     Hematology:  Easy bruising denies. Prolonged bleeding denies.     Genitourinary:  Frequent urination denies. Pain in lower back denies. Painful urination denies.     Musculoskeletal:  See HPI for details    Skin:  Rash denies.    Neurologic:  Dizziness denies. Gait abnormalities denies. Seizures denies.  Tingling/Numbess denies.    Psychiatric:  Anxiety denies. Depressed mood denies.     Objective:   Vital Signs:   Vitals:    06/10/24 1049   BP: 126/80        Physical Exam      General Examination:     Constitutional: The patient is alert and oriented to lace person and time. Mood is pleasant.     Head/Face: Normal facial features normal eyebrows    Eyes: Normal extraocular motion bilaterally    Lungs: Respirations are equal and unlabored    Gait is coordinated.    Cardiovascular: There are no swelling or varicosities present.    Lymphatic: Negative for adenopathy    Skin: Normal    Neurological: Level of consciousness normal. Oriented to place person and time and situation    Psychiatric: Oriented to time place person and situation    Cervical exam:  Skin throughout the neck clean dry and intact.  No erythema or ecchymosis.  No signs or symptoms of infection.  She is neurovascularly intact throughout bilateral upper extremities.  She does have positive Rajni signs bilaterally.  She has bilateral lower extremity clonus with ankle dorsiflexion.  Positive Romberg's.  Hyperreflexia to bilateral lower extremities x4.  She does have a spastic gait.  She ambulates with a single prong cane.    XRAY Report/ Interpretation:  No new radiographs taken on today's clinic visit.  Prior MRI reviewed I agree with the results there is myelomalacia and also signal intensity in the spinal cord adjacent to the C6-7 disc space please see report for details also changes noted at C5-6    l Spine Without Contrast  MRI Cervical Spine Without Contrast  Order: [id number]  Status: Final result       Visible to patient: Yes (seen)       Next appt: 07/30/2024 at 10:30 AM in Pre-Admission Testing (PRE-ADMIT NURSE, TRACY)       Dx: Cervicalgia; Hyperreflexia    0 Result Notes  Details    Reading Physician Reading Date Result Priority   Aniceto Chaudhari MD  [phone] 2/11/2024      Narrative & Impression  MRI cervical spine without  contrast     CLINICAL DATA: M [age], neck pain     FINDINGS: Multiplanar noncontrast imaging was performed. Comparison is made to January 2023.     There is no evidence of cervical fracture or osseous destructive lesion. Marrow signal changes at C5 and C6 are similar to the prior study and felt to be degenerative in nature. There is slight C5 retrolisthesis, chronic and unchanged. Alignment at all other levels is normal.     There is abnormal signal within the substance of the cervical spinal cord at the C6-7 level compatible with myelomalacia. Based on sagittal T2-weighted images, this is mildly increased compared to 2023.     C2-3: No significant abnormalities.     C3-4: No significant abnormalities.     C4-5: There is mild bilateral degenerative facet hypertrophy with mild bilateral foraminal stenosis. No significant central canal narrowing.     C5-6: Broad-based disc/osteophyte complex is similar to the prior study. This results in moderate spinal stenosis and mild compression of the ventral aspect of the cervical cord, not significantly changed. In combination with facet hypertrophy, there is severe right and moderate to severe left foraminal stenosis, unchanged.     C6-7: Broad-based disc/osteophyte complex results in severe spinal stenosis, increased compared to the prior study, with associated cord compression and slightly increasing myelomalacia. Bilateral uncinate spurring causes severe bilateral foraminal stenosis, stable.     C7-T1: No significant abnormalities.     IMPRESSION:  1. Multilevel cervical degenerative disc and facet disease as above. Of particular note, there is evidence of severe spinal stenosis at the C6-7 level secondary to broad-based disc/osteophyte complex, mildly increased compared to 2023. Slightly increasing T2 hyperintensity within the substance of the cervical cord at this level is likely on the basis of myelomalacia.  2. Please see additional details as noted at each level  above.     Electronically signed by:  Aniceto Chaudhari MD  02/11/2024 04:10 PM CST Workstation: [id number]                  Assessment:       1. Cervical cord myelomalacia    2. Bulging of cervical intervertebral disc    3. Foraminal stenosis of cervical region    4. Disc disease, degenerative, cervical    5. Osteoarthritis of spine with radiculopathy, cervical region    6. Cervical nerve root impingement    7. Stenosis, cervical spine        Plan:       Zhang was seen today for pain.    Diagnoses and all orders for this visit:    Cervical cord myelomalacia    Bulging of cervical intervertebral disc    Foraminal stenosis of cervical region    Disc disease, degenerative, cervical    Osteoarthritis of spine with radiculopathy, cervical region    Cervical nerve root impingement    Stenosis, cervical spine         Follow up for Surgery.    Once again, a long discussion was had with her today about treatment options.  We reviewed her MRI images of the cervical spine.  Discussed the pathophysiology of her diagnosis and prognosis.  We discussed expected outcome surgically.  Plan would be to proceed with a C6 corpectomy with fusion from C5-C7.  Risks and benefits of the procedure were discussed with her today.  All of her questions were answered.  She understood and wished to proceed.  At her request, we will keep her in physical therapy until then to try to maintain strength and work on gait.    Patient agrees to undergo surgery which would consist of diskectomies at C5-6 and C6-7 corpectomy of C6 insertion of corpectomy device anterior fusion with spinal cord decompression no guarantees have been given regards to improvement of neurological condition patient clearly understands that the objective of the surgery is an attempt to prevent neurological worsening due to the natural history of spinal stenosis as well as cervical spinal cord myelomalacia I believe she understands the pros and cons and realistic expectations  of surgery.  The technical aspects of the surgery were discussed in detail with the patient today. The patient was able to verbalize an understanding. The procedure risk benefits alternatives and possible complications of the surgical procedure was discussed including expected outcomes. No guarantees were given regards to outcomes. Consent forms were will be signed at a later date. All questions regarding the surgery itself were answered. The patient wishes to proceed with surgery and will be scheduled. The patient may require preoperative medical clearance.    The risk associated with the spinal condition and an worsening of the condition was discussed with the patient expressed a thorough understanding.  The patient was warned about the possible development of cauda equina syndrome and its symptoms which include but are not limited to bowel or bladder dysfunction sexual dysfunction increasing pain increasing extremity numbness or weakness and saddle anesthesia.  The patient was advised to either contact me immediately or to go to the nearest hospital emergency room if symptoms of cauda equina syndrome with to develop.  The patient expressed an understanding of these instructions.      Treatment options were discussed with regards to the nature of the medical condition. Conservative pain intervention and surgical options were discussed in detail. The probability of success of each separate treatment option was discussed. The patient expressed a clear understanding of the treatment options. With regards to surgery, the procedure risk, benefits, complications, and outcomes were discussed. No guarantees were given with regards to surgical outcome.   The risk of complications, morbidity, and mortality of patient management decisions have been made at the time of this visit. These are associated with the patient's problems, diagnostic procedures and treatment options. This includes the possible management options  selected and those considered but not selected by the patient after shared medical decision making we discussed with the patient.     This note was created using Dragon voice recognition software that occasionally misinterpreted phrases or words.

## 2024-06-12 ENCOUNTER — CLINICAL SUPPORT (OUTPATIENT)
Dept: REHABILITATION | Facility: HOSPITAL | Age: 49
End: 2024-06-12
Payer: MEDICAID

## 2024-06-12 DIAGNOSIS — G95.89 CERVICAL CORD MYELOMALACIA: ICD-10-CM

## 2024-06-12 PROCEDURE — 97110 THERAPEUTIC EXERCISES: CPT | Mod: PO

## 2024-06-12 PROCEDURE — 97161 PT EVAL LOW COMPLEX 20 MIN: CPT | Mod: PO

## 2024-06-12 NOTE — PLAN OF CARE
[facility]   Physical Therapy Initial Evaluation      Name: Zhang Kurtz  Clinic Number: [id number]    Therapy Diagnosis:   Encounter Diagnosis   Name Primary?    Cervical cord myelomalacia         Physician: Tim Crowe MD    Physician Orders: PT Eval and Treat   Medical Diagnosis from Referral: Cervical spine myelomalacia.  Evaluation Date: 6/12/2024  Authorization Period Expiration: 6/10/25  Plan of Care Expiration: 8/23/24  Progress Note Due: 7/10/24  Date of Surgery: NA  Visit # / Visits authorized: 1/ 1   FOTO: 1/ 3    Precautions: Standard and Fall     Time In: 1350  Time Out: 1425  Total Billable Time: 35 minutes    Subjective     Date of onset: Insidious.    History of current condition - Zhang reports: neck pain started ~ 10-15 yrs ago without trauma.Gradually getting worse.Pt reports difficulties with ADLs,functional activities, homemaking.She is scheduled for [facility] 7/9/24.Right side hurts more.    Falls: 2 X in last year.    Imaging: See EPIC:     Prior Therapy: Multiple, see EPIC.  Social History: in house [address] lives with their family  Occupation: No.  Prior Level of Function: Independent.  Current Level of Function: Modified independent, ambulating with cane.    Pain:  Current 6/10, worst 9/10, best 3/10   Location: bilateral neck    Description: Aching, Dull, Throbbing, Sharp, and Variable  Aggravating Factors: Standing, Bending, Walking, Extension, Flexing, Lifting, and Getting out of bed/chair  Easing Factors: relaxation, pain medication, and rest    Patients goals: get stronger.     Medical History:   Past Medical History:   Diagnosis Date    Abnormal Pap smear     High grade squamous intraepithelial lesion.    Anxiety     Breast disorder     lumps removed in the past,  bilateral    Cervical nerve root impingement 11/27/2023    Depression     Dysplasia of cervix     s/p CKC    Fatty liver     noted on 1/12 USG    Foraminal stenosis of cervical region 12/06/2012     Foraminal stenosis of lumbar region 11/27/2023    Generalized anxiety disorder 10/17/2012    Generalized headaches     Hyperlipidemia     Increased prolactin level     Manic depressive disorder 10/17/2012    Osteoarthritis of spine with radiculopathy, cervical region 12/06/2012    Pituitary microadenoma with hyperprolactinemia     Vitamin D deficiency        Surgical History:   Zhang Kurtz  has a past surgical history that includes Cervical conization w/ laser and Breast biopsy.    Medications:   Zhang has a current medication list which includes the following prescription(s): cyclobenzaprine, ergocalciferol, gabapentin, meloxicam, quetiapine, tizanidine, tramadol, and vibegron.    Allergies:   Review of patient's allergies indicates:   Allergen Reactions    Codeine Nausea Only        Objective        Cervical AROM: Pain/Dysfunction with Movement:   Flexion  35    Extension  40    Right side bending   30    Left side bending  35    Right rotation  65    Left rotation   70      Strength of c/spine is grossly 3-/5 in all planes.  Posture;head forward, scoliosis, right shoulder elevated.  Special tests;  Compression test on c/spine;neg.  C5 test;pos.  ; (2); RT;40, LT;35#, pt is right handed.  Palpation;Cervical multifidus and both upper traps tender.    Intake Outcome Measure for FOTO neck Survey    Therapist reviewed FOTO scores for Zhang Kurtz on 6/12/2024.   FOTO report - see Media section or FOTO account episode details.    Intake Score: 68%         Treatment     Total Treatment time (time-based codes) separate from Evaluation: 8 minutes     Zhang received the treatments listed below:      therapeutic exercises to develop strength, endurance, ROM, flexibility, posture, and core stabilization for 8 minutes including:  ZACK 4/4'      Patient Education and Home Exercises     Education provided:   - Role of PT.POC.    Written Home Exercises Provided:  to be issued .   Assessment     Zhang is a 48 y.o.  female referred to outpatient Physical Therapy with a medical diagnosis of c/spine myelomalacia. Patient presents with ROM and strength deficits, poor posture, impaired function due to pain and above listed deficits.    Patient prognosis is Fair.   Patient will benefit from skilled outpatient Physical Therapy to address the deficits stated above and in the chart below, provide patient /family education, and to maximize patientt's level of independence.     Plan of care discussed with patient: Yes  Patient's spiritual, cultural and educational needs considered and patient is agreeable to the plan of care and goals as stated below:     Anticipated Barriers for therapy: no    Medical Necessity is demonstrated by the following  History  Co-morbidities and personal factors that may impact the plan of care [x] LOW: no personal factors / co-morbidities  [] MODERATE: 1-2 personal factors / co-morbidities  [] HIGH: 3+ personal factors / co-morbidities    Moderate / High Support Documentation:   Co-morbidities affecting plan of care:     Personal Factors:   no deficits     Examination  Body Structures and Functions, activity limitations and participation restrictions that may impact the plan of care [x] LOW: addressing 1-2 elements  [] MODERATE: 3+ elements  [] HIGH: 4+ elements (please support below)    Moderate / High Support Documentation:      Clinical Presentation [x] LOW: stable  [] MODERATE: Evolving  [] HIGH: Unstable     Decision Making/ Complexity Score: low       Goals:  SHORT TERM GOALS:  3 weeks  Progress Date met   Recent signs and systems trend is improving in order to progress towards Long term goals.  [] Met  [] Not Met  [] Progressing    Patient will be independent with Home Exercise Program  in order to further progress and return to maximal function. [] Met  [] Not Met  [] Progressing    Pain rating at Worst: 5 /10 in order to progress towards increased independence with activity. [] Met  [] Not Met  []  Progressing    Patient will be able to correct postural deviations in sitting and standing, to decrease pain and promote postural awareness for injury prevention.  [] Met  [] Not Met  [] Progressing    Patient will improve functional outcome (FOTO) score: by 5% to increase self-worth & perceived functional ability towards long term goals [] Met  [] Not Met  [] Progressing      LONG TERM GOALS: 6 weeks  Progress Date met   Patient will return to normal activites of daily living, recreational, and work related activities with less pain and limitation.  [] Met  [] Not Met  [] Progressing    Patient will improve range of motion  to stated goals in order to return to maximal functional potential. ROM of c/spine WFL/WNL.Strength of c/spine 5/5 in all planes. [] Met  [] Not Met  [] Progressing    Patient will improve Strength to stated goals of appropriate musculature in order to improve functional independence.  [] Met  [] Not Met  [] Progressing    Pain Rating at Best: 1/10 to improve Quality of Life.  [] Met  [] Not Met  [] Progressing    Patient will meet predicted functional outcome (FOTO) score: 50% to increase self-worth & perceived functional ability. [] Met  [] Not Met  [] Progressing    Patient will have met/partially met personal goal of: improve strength and function. [] Met  [] Not Met  [] Progressing         Plan     Plan of care Certification: 6/12/2024 to 8/23/24.    Outpatient Physical Therapy 2 times weekly for 6 weeks to include the following interventions: Cervical/Lumbar Traction, Electrical Stimulation PRN, Manual Therapy, Moist Heat/ Ice, Patient Education, Therapeutic Activities, Therapeutic Exercise, and dry needling PRN.[facility] PRN. .     Guilherme Michelle PT        Physician's Signature: _________________________________________ Date: ________________

## 2024-06-20 ENCOUNTER — CLINICAL SUPPORT (OUTPATIENT)
Dept: REHABILITATION | Facility: HOSPITAL | Age: 49
End: 2024-06-20
Payer: MEDICAID

## 2024-06-20 DIAGNOSIS — M50.30 DISC DISEASE, DEGENERATIVE, CERVICAL: Primary | ICD-10-CM

## 2024-06-20 DIAGNOSIS — G95.89 CERVICAL CORD MYELOMALACIA: ICD-10-CM

## 2024-06-20 DIAGNOSIS — M50.30 BULGING OF CERVICAL INTERVERTEBRAL DISC: ICD-10-CM

## 2024-06-20 PROCEDURE — 97110 THERAPEUTIC EXERCISES: CPT | Mod: PO,CQ

## 2024-06-20 NOTE — PROGRESS NOTES
"[facility]  Outpatient Physical Therapy Daily Treatment Note      Name: Zhang Kurtz  Clinic Number: [id number]  Visit Date: 6/20/2024    Therapy Diagnosis:   Encounter Diagnoses   Name Primary?    Disc disease, degenerative, cervical Yes    Cervical cord myelomalacia     Bulging of cervical intervertebral disc        Physician: Tim Crowe MD  Physician Orders: {AMB PT KNEE ORDERS:46257} ***  Medical Diagnosis from Referral: ***    Evaluation Date: ***  Plan of Care Certification Period: ***  Authorization Period Start / Expiration: ***  Visit # / Visits authorized: ***/ *** (***x/wk for *** weeks totaling *** visits)  Progress note due on     Time In: ***  Time Out: ***  Total Billable Timed: *** minutes  Total Billable Un-timed: *** minutes    Precautions: {IP WOUND PRECAUTIONS OHS:34845}    Subjective     The patient presents to therapy ***    Response to previous treatment: ***  Functional change: ***    Pain: {0-10:20507::"0"}/10  Location: {RIGHT/LEFT/BILATERAL:36409} {LOCATION ON BODY:45379}     Objective     Zhang received therapeutic exercises to develop {AMB PT PROGRESS OBJECTIVE:61889} for *** minutes including:    Nustep   NBOS on air airex EO and EC   Single leg stance 30 sec each side   Resisted lateral walks with OTB   Monster walks iwht OTB   Step up on middle step  Shuttle 62 # 3 x 10     Zhang received the following manual therapy techniques: {AMB PT PROGRESS MANUAL THERAPY:82353} were applied to the: *** for *** minutes, including:  ***    Zhang participated in neuromuscular re-education activities to improve: {AMB PT PROGRESS NEURO RE-ED:95033} for *** minutes. The following activities were included:  ***    Zhang participated in dynamic functional therapeutic activities to improve functional performance for ***  minutes, including:  ***    Zhang participated in gait training to improve functional mobility and safety for ***  " "minutes, including:  ***    Zhang received the following direct contact modalities after being cleared for contraindications: {AMB PT PROGRESS DIRECT CONTACT MODES:33302}    Zhang received the following supervised modalities after being cleared for contradictions: {AMB PT SUPERVISED MODES:75415}    Zhang received hot pack for *** minutes to ***.    Zhang received cold pack for *** minutes to ***.      Patient Education and HEP     She {Actions; was/was not:32447} compliant with home exercise program.    Education provided:   - ***    Written Home Exercises Provided: {Blank single:44282::"yes","Patient instructed to cont prior HEP"}.  Exercises were reviewed and Zhang was able to demonstrate them prior to the end of the session.  Zhang demonstrated {Desc; good/fair/poor:25368} understanding of the education provided.     See EMR under {Blank single:98569::"Media","Patient Instructions"} for exercises provided {Blank single:34253::"6/20/2024","prior visit"}.    Assessment     The patient ***    Pt will continue to benefit from skilled outpatient physical therapy to address the deficits listed in the problem list box on initial evaluation, provide pt/family education and to maximize pt's level of independence in the home and community environment.     Zhang {IS/IS NOT:38042} progressing well towards her goals.   Pt prognosis is {REHAB PROGNOSIS OHS:11018}.     Pt's spiritual, cultural and educational needs considered and pt agreeable to plan of care and goals.    Anticipated barriers to physical therapy: ***    Goals: ***    Plan     Continue with the plan of care established per initial evaluation    Ashlie Solis, PTA      "

## 2024-06-20 NOTE — PROGRESS NOTES
[facility]  Outpatient Physical Therapy Daily Treatment Note      Name: Zhang Kurtz  Clinic Number: [id number]  Visit Date: 6/20/2024    Therapy Diagnosis:   Encounter Diagnoses   Name Primary?    Disc disease, degenerative, cervical Yes    Cervical cord myelomalacia     Bulging of cervical intervertebral disc        Physician: Tim Crowe MD    Subjective     The patient presents to therapy stating her neck doesn't bother her as much as the lack of stability of her hips and legs.     Response to previous treatment: first f/u  Functional change: first f/u    Pain:  5 /10  Location:  lower extremities     Objective     Zhang received therapeutic exercises to develop strength, endurance, ROM, flexibility, posture, and core stabilization for 40 minutes including:    Nustep 10 minutes   NBOS on air airex EO and EC 3 x 30 sec each   Single leg stance 30 sec each side   Resisted lateral walks with OTB 10 ft x 4   Monster walks with OTB 10 ft x 3 FWD only   Step up on middle step 10 x each leg   Shuttle 62 # 3 x 10       Patient Education and HEP     She was not compliant with home exercise program.    Education provided:   -     Written Home Exercises Provided:    .  Exercises were reviewed and Zhang was able to demonstrate them prior to the end of the session.  Zhang demonstrated     understanding of the education provided.     See EMR under  for exercises provided .    Assessment     The patient presented to clinic engaged and willing to participate with therapy. She entered with minimal dependence on Assistive device but increased dependence on cane after treatment when fatigue had increased. Will continue to progress as able.     Pt will continue to benefit from skilled outpatient physical therapy to address the deficits listed in the problem list box on initial evaluation, provide pt/family education and to maximize pt's level of independence in the  home and community environment.     Zhang Is progressing well towards her goals.   Pt prognosis is Fair.     Pt's spiritual, cultural and educational needs considered and pt agreeable to plan of care and goals.    Anticipated barriers to physical therapy: severity/ chronicity of condition     Goals: Goals:  SHORT TERM GOALS:  3 weeks  Progress Date met   Recent signs and systems trend is improving in order to progress towards Long term goals.  [] Met  [] Not Met  [] Progressing     Patient will be independent with Home Exercise Program  in order to further progress and return to maximal function. [] Met  [] Not Met  [] Progressing     Pain rating at Worst: 5 /10 in order to progress towards increased independence with activity. [] Met  [] Not Met  [] Progressing     Patient will be able to correct postural deviations in sitting and standing, to decrease pain and promote postural awareness for injury prevention.  [] Met  [] Not Met  [] Progressing     Patient will improve functional outcome (FOTO) score: by 5% to increase self-worth & perceived functional ability towards long term goals [] Met  [] Not Met  [] Progressing        LONG TERM GOALS: 6 weeks  Progress Date met   Patient will return to normal activites of daily living, recreational, and work related activities with less pain and limitation.  [] Met  [] Not Met  [] Progressing     Patient will improve range of motion  to stated goals in order to return to maximal functional potential. ROM of c/spine WFL/WNL.Strength of c/spine 5/5 in all planes. [] Met  [] Not Met  [] Progressing     Patient will improve Strength to stated goals of appropriate musculature in order to improve functional independence.  [] Met  [] Not Met  [] Progressing     Pain Rating at Best: 1/10 to improve Quality of Life.  [] Met  [] Not Met  [] Progressing     Patient will meet predicted functional outcome (FOTO) score: 50% to increase self-worth & perceived functional ability. []  Met  [] Not Met  [] Progressing     Patient will have met/partially met personal goal of: improve strength and function. [] Met  [] Not Met  [] Progressing          Plan     Continue with the plan of care established per initial evaluation    Ashlie Solis, PTA

## 2024-06-21 ENCOUNTER — DOCUMENTATION ONLY (OUTPATIENT)
Dept: REHABILITATION | Facility: HOSPITAL | Age: 49
End: 2024-06-21
Payer: MEDICAID

## 2024-06-29 ENCOUNTER — HOSPITAL ENCOUNTER (EMERGENCY)
Facility: HOSPITAL | Age: 49
Discharge: HOME | End: 2024-06-29
Attending: EMERGENCY MEDICINE
Payer: MEDICAID

## 2024-06-29 VITALS
HEART RATE: 70 BPM | TEMPERATURE: 98 F | DIASTOLIC BLOOD PRESSURE: 67 MMHG | OXYGEN SATURATION: 100 % | BODY MASS INDEX: 23.72 KG/M2 | HEIGHT: 58 IN | WEIGHT: 113 LBS | SYSTOLIC BLOOD PRESSURE: 112 MMHG | RESPIRATION RATE: 16 BRPM

## 2024-06-29 DIAGNOSIS — S01.01XA LACERATION OF SCALP WITHOUT FOREIGN BODY, INITIAL ENCOUNTER: ICD-10-CM
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
## 2024-06-29 DIAGNOSIS — R55 SYNCOPE: ICD-10-CM

## 2024-06-29 LAB
ALBUMIN SERPL BCP-MCNC: 3.5 G/DL (ref 3.5–5.2)
ALP SERPL-CCNC: 33 U/L (ref 55–135)
ALT SERPL W/O P-5'-P-CCNC: 7 U/L (ref 10–44)
ANION GAP SERPL CALC-SCNC: 8 MMOL/L (ref 8–16)
AST SERPL-CCNC: 12 U/L (ref 10–40)
BASOPHILS # BLD AUTO: 0.05 K/UL (ref 0–0.2)
BASOPHILS NFR BLD: 0.6 % (ref 0–1.9)
BILIRUB SERPL-MCNC: 0.3 MG/DL (ref 0.1–1)
BNP SERPL-MCNC: 10 PG/ML (ref 0–99)
BNP SERPL-MCNC: 10 PG/ML (ref 0–99)
BUN SERPL-MCNC: 14 MG/DL (ref 6–20)
CALCIUM SERPL-MCNC: 8 MG/DL (ref 8.7–10.5)
CHLORIDE SERPL-SCNC: 112 MMOL/L (ref 95–110)
CK SERPL-CCNC: 71 U/L (ref 20–180)
CO2 SERPL-SCNC: 19 MMOL/L (ref 23–29)
CREAT SERPL-MCNC: 0.8 MG/DL (ref 0.5–1.4)
DIFFERENTIAL METHOD BLD: ABNORMAL
EOSINOPHIL # BLD AUTO: 0.1 K/UL (ref 0–0.5)
EOSINOPHIL NFR BLD: 1.4 % (ref 0–8)
ERYTHROCYTE [DISTWIDTH] IN BLOOD BY AUTOMATED COUNT: 14.6 % (ref 11.5–14.5)
EST. GFR  (NO RACE VARIABLE): >60 ML/MIN/1.73 M^2
GLUCOSE SERPL-MCNC: 104 MG/DL (ref 70–110)
GLUCOSE SERPL-MCNC: 108 MG/DL (ref 70–110)
HCT VFR BLD AUTO: 42.5 % (ref 37–48.5)
HGB BLD-MCNC: 14.7 G/DL (ref 12–16)
IMM GRANULOCYTES # BLD AUTO: 0.01 K/UL (ref 0–0.04)
IMM GRANULOCYTES NFR BLD AUTO: 0.1 % (ref 0–0.5)
LDH SERPL L TO P-CCNC: 1.84 MMOL/L (ref 0.5–2.2)
LYMPHOCYTES # BLD AUTO: 5.3 K/UL (ref 1–4.8)
LYMPHOCYTES NFR BLD: 62.1 % (ref 18–48)
MAGNESIUM SERPL-MCNC: 1.8 MG/DL (ref 1.6–2.6)
MCH RBC QN AUTO: 31.5 PG (ref 27–31)
MCHC RBC AUTO-ENTMCNC: 34.6 G/DL (ref 32–36)
MCV RBC AUTO: 91 FL (ref 82–98)
MONOCYTES # BLD AUTO: 0.5 K/UL (ref 0.3–1)
MONOCYTES NFR BLD: 5.7 % (ref 4–15)
NEUTROPHILS # BLD AUTO: 2.6 K/UL (ref 1.8–7.7)
NEUTROPHILS NFR BLD: 30.1 % (ref 38–73)
NRBC BLD-RTO: 0 /100 WBC
PLATELET # BLD AUTO: 194 K/UL (ref 150–450)
PMV BLD AUTO: 10.1 FL (ref 9.2–12.9)
POTASSIUM SERPL-SCNC: 3.6 MMOL/L (ref 3.5–5.1)
PROT SERPL-MCNC: 5.4 G/DL (ref 6–8.4)
RBC # BLD AUTO: 4.66 M/UL (ref 4–5.4)
SAMPLE: NORMAL
SODIUM SERPL-SCNC: 139 MMOL/L (ref 136–145)
TROPONIN I SERPL HS-MCNC: <2.3 PG/ML (ref 0–14.9)
WBC # BLD AUTO: 8.47 K/UL (ref 3.9–12.7)

## 2024-06-29 PROCEDURE — 82962 GLUCOSE BLOOD TEST: CPT

## 2024-06-29 PROCEDURE — 99285 EMERGENCY DEPT VISIT HI MDM: CPT | Mod: 25

## 2024-06-29 PROCEDURE — 12001 RPR S/N/AX/GEN/TRNK 2.5CM/<: CPT

## 2024-06-29 PROCEDURE — 93010 ELECTROCARDIOGRAM REPORT: CPT | Mod: ,,, | Performed by: INTERNAL MEDICINE

## 2024-06-29 PROCEDURE — 25000003 PHARM REV CODE 250: Performed by: STUDENT IN AN ORGANIZED HEALTH CARE EDUCATION/TRAINING PROGRAM

## 2024-06-29 PROCEDURE — 83735 ASSAY OF MAGNESIUM: CPT | Performed by: EMERGENCY MEDICINE

## 2024-06-29 PROCEDURE — 80053 COMPREHEN METABOLIC PANEL: CPT | Performed by: EMERGENCY MEDICINE

## 2024-06-29 PROCEDURE — 93005 ELECTROCARDIOGRAM TRACING: CPT | Mod: 59 | Performed by: INTERNAL MEDICINE

## 2024-06-29 PROCEDURE — 25000003 PHARM REV CODE 250: Performed by: EMERGENCY MEDICINE

## 2024-06-29 PROCEDURE — 96360 HYDRATION IV INFUSION INIT: CPT | Mod: 59

## 2024-06-29 PROCEDURE — 84484 ASSAY OF TROPONIN QUANT: CPT | Performed by: EMERGENCY MEDICINE

## 2024-06-29 PROCEDURE — 83880 ASSAY OF NATRIURETIC PEPTIDE: CPT | Performed by: EMERGENCY MEDICINE

## 2024-06-29 PROCEDURE — 96361 HYDRATE IV INFUSION ADD-ON: CPT

## 2024-06-29 PROCEDURE — 85025 COMPLETE CBC W/AUTO DIFF WBC: CPT | Performed by: EMERGENCY MEDICINE

## 2024-06-29 PROCEDURE — 63600175 PHARM REV CODE 636 W HCPCS: Performed by: STUDENT IN AN ORGANIZED HEALTH CARE EDUCATION/TRAINING PROGRAM

## 2024-06-29 PROCEDURE — 90471 IMMUNIZATION ADMIN: CPT | Performed by: STUDENT IN AN ORGANIZED HEALTH CARE EDUCATION/TRAINING PROGRAM

## 2024-06-29 PROCEDURE — 90715 TDAP VACCINE 7 YRS/> IM: CPT | Performed by: STUDENT IN AN ORGANIZED HEALTH CARE EDUCATION/TRAINING PROGRAM

## 2024-06-29 PROCEDURE — 82550 ASSAY OF CK (CPK): CPT | Performed by: EMERGENCY MEDICINE

## 2024-06-29 RX ORDER — ACETAMINOPHEN 500 MG
1000 TABLET ORAL
Status: COMPLETED | OUTPATIENT
Start: 2024-06-29 | End: 2024-06-29

## 2024-06-29 RX ORDER — MUPIROCIN 20 MG/G
OINTMENT TOPICAL 2 TIMES DAILY
Qty: 22 G | Refills: 0 | Status: SHIPPED | OUTPATIENT
Start: 2024-06-29 | End: 2024-07-06

## 2024-06-29 RX ORDER — MUPIROCIN 20 MG/G
OINTMENT TOPICAL
Status: COMPLETED | OUTPATIENT
Start: 2024-06-29 | End: 2024-06-29

## 2024-06-29 RX ORDER — LIDOCAINE HYDROCHLORIDE 10 MG/ML
5 INJECTION, SOLUTION EPIDURAL; INFILTRATION; INTRACAUDAL; PERINEURAL
Status: COMPLETED | OUTPATIENT
Start: 2024-06-29 | End: 2024-06-29

## 2024-06-29 RX ADMIN — LIDOCAINE HYDROCHLORIDE 50 MG: 10 INJECTION, SOLUTION EPIDURAL; INFILTRATION; INTRACAUDAL; PERINEURAL at 10:06

## 2024-06-29 RX ADMIN — CLOSTRIDIUM TETANI TOXOID ANTIGEN (FORMALDEHYDE INACTIVATED), CORYNEBACTERIUM DIPHTHERIAE TOXOID ANTIGEN (FORMALDEHYDE INACTIVATED), BORDETELLA PERTUSSIS TOXOID ANTIGEN (GLUTARALDEHYDE INACTIVATED), BORDETELLA PERTUSSIS FILAMENTOUS HEMAGGLUTININ ANTIGEN (FORMALDEHYDE INACTIVATED), BORDETELLA PERTUSSIS PERTACTIN ANTIGEN, AND BORDETELLA PERTUSSIS FIMBRIAE 2/3 ANTIGEN 0.5 ML: 5; 2; 2.5; 5; 3; 5 INJECTION, SUSPENSION INTRAMUSCULAR at 09:06

## 2024-06-29 RX ADMIN — SODIUM CHLORIDE 1000 ML: 9 INJECTION, SOLUTION INTRAVENOUS at 09:06

## 2024-06-29 RX ADMIN — ACETAMINOPHEN 1000 MG: 500 TABLET, FILM COATED ORAL at 11:06

## 2024-06-29 RX ADMIN — MUPIROCIN: 20 OINTMENT TOPICAL at 10:06

## 2024-06-30 NOTE — ED PROVIDER NOTES
Encounter Date: 6/29/2024       History     Chief Complaint   Patient presents with    Loss of Consciousness     She fell back and hit her head. She does have a small laceration to the back of her head. No loc. She does have a history of neck pain and does need surgery. Patient placed in a C- collar.       HPI  48 year old F w/ PMH of cervical central canal stenosis with cervical myelomalacia working with physical therapy and following Dr. Crowe with Orthopedic surgery with plans for surgical spinal intervention, last visit was on 6/10/24 presents to the ED today due to syncopal episode.    Upon interview of the patient, the patient sister, patient had a syncopal episode and landing on the back of her head while outside. Ground level fall and hit her head on brick paving. No n/v/d, fevers or chills.     Patient denies urinary incontinence, decreased groin sensation, or tongue pain. Patient reports that she does not remember what happened. Patient denies any bleeding but is on her period. No recent illness. Unknown last tetanus shot. Patient reports baseline bilateral LE twitching with tonic movements. Patient reports baseline RLE numbness.  Review of patient's allergies indicates:   Allergen Reactions    Codeine Nausea Only     Past Medical History:   Diagnosis Date    Abnormal Pap smear     High grade squamous intraepithelial lesion.    Anxiety     Breast disorder     lumps removed in the past,  bilateral    Cervical nerve root impingement 11/27/2023    Depression     Dysplasia of cervix     s/p CKC    Fatty liver     noted on 1/12 USG    Foraminal stenosis of cervical region 12/06/2012    Foraminal stenosis of lumbar region 11/27/2023    Generalized anxiety disorder 10/17/2012    Generalized headaches     Hyperlipidemia     Increased prolactin level     Manic depressive disorder 10/17/2012    Osteoarthritis of spine with radiculopathy, cervical region 12/06/2012    Pituitary microadenoma with hyperprolactinemia      Vitamin D deficiency      Past Surgical History:   Procedure Laterality Date    BREAST BIOPSY      bening    CERVICAL CONIZATION   W/ LASER       Family History   Problem Relation Name Age of Onset    Cancer Mother          lymph node CA    Asthma Daughter      Anesthesia problems Neg Hx      Clotting disorder Neg Hx       Social History     Tobacco Use    Smoking status: Every Day     Current packs/day: 1.00     Average packs/day: 1 pack/day for 10.0 years (10.0 ttl pk-yrs)     Types: Cigars, Cigarettes    Smokeless tobacco: Never    Tobacco comments:     cigars   Substance Use Topics    Alcohol use: Yes     Alcohol/week: 1.0 standard drink of alcohol     Types: 1 Standard drinks or equivalent per week    Drug use: No     Review of Systems   Constitutional:  Negative for chills and fever.   HENT:          No tongue pain   Respiratory:  Negative for cough and shortness of breath.    Cardiovascular:  Negative for chest pain and palpitations.   Gastrointestinal:  Negative for abdominal pain, nausea and vomiting.   Musculoskeletal:  Negative for back pain.   Skin:  Positive for wound.   Neurological:  Positive for syncope and weakness. Negative for seizures.   Psychiatric/Behavioral:  Negative for agitation and confusion.        Physical Exam     Initial Vitals [06/29/24 2045]   BP Pulse Resp Temp SpO2   (!) 63/45 (!) 55 18 97.4 °F (36.3 °C) 97 %      MAP       --         Physical Exam    Nursing note and vitals reviewed.  Constitutional: She appears well-developed.   HENT:   Head: Normocephalic.   1 cm vertical laceration to the posterior right scalp   Eyes: EOM are normal. Right eye exhibits no discharge. Left eye exhibits no discharge.   Neck:   In C-collar   Cardiovascular:  Normal rate and normal heart sounds.           Pulmonary/Chest: No respiratory distress. She has no wheezes.   Abdominal: Abdomen is soft. There is no abdominal tenderness. There is no guarding.   Musculoskeletal:         General: No  tenderness or edema.      Comments: Bilateral LE with spastic tonic movements. Hyperreflexia bilaterally     Neurological:   CN 2-12 intact  Able to perform cerebellar testing w/o dysmetria  Strength symmetric in bilateral LE and LE  Numbness on RUE and RLE compared to Left side  Alert and oriented to person, place, and time  Bilateral LE with visible clonus   Hyperreflexic Knee reflex 4+   Skin: Skin is warm.   Psychiatric: She has a normal mood and affect.   Alert and oriented to person, place, and time         ED Course   Lac Repair    Date/Time: 6/29/2024 11:03 PM    Performed by: Jarek Rizo MD  Authorized by: Freeman Hanson MD    Consent:     Consent obtained:  Verbal    Consent given by:  Patient    Risks, benefits, and alternatives were discussed: yes      Risks discussed:  Infection, pain, poor cosmetic result and poor wound healing    Alternatives discussed:  No treatment  Universal protocol:     Imaging studies available: yes      Patient identity confirmed:  Verbally with patient and arm band  Anesthesia:     Anesthesia method:  Local infiltration    Local anesthetic:  Lidocaine 1% w/o epi  Laceration details:     Location:  Scalp    Scalp location:  Occipital    Wound length (cm): 1.5 cm.  Pre-procedure details:     Preparation:  Patient was prepped and draped in usual sterile fashion  Exploration:     Imaging outcome: foreign body not noted      Contaminated: no    Treatment:     Area cleansed with:  Saline    Amount of cleaning:  Extensive    Irrigation solution:  Sterile water    Irrigation method:  Pressure wash    Visualized foreign bodies/material removed: no      Debridement:  None  Skin repair:     Repair method:  Sutures    Suture size:  5-0    Suture material:  Prolene    Suture technique:  Simple interrupted    Number of sutures: 2.  Approximation:     Approximation:  Loose  Repair type:     Repair type:  Simple  Post-procedure details:     Dressing:  Antibiotic ointment    Procedure  completion:  Tolerated well, no immediate complications    Labs Reviewed - No data to display       Imaging Results    None          Medications - No data to display  Medical Decision Making  Amount and/or Complexity of Data Reviewed  Labs: ordered.  Radiology: ordered. Decision-making details documented in ED Course.    Risk  OTC drugs.  Prescription drug management.               ED Course as of 24   Sat  EK bpm, sinus bradycardia, NAD, no pr prolongation, QRS widening or Qtc prolongation. No acute ischemic findings. No Brugada, delta wave, Epsilon wave, No LBBB or RBBB [JN]    X-Ray Chest AP Portable  No acute abnormality. [JN]    CT Head Without Contrast  No CT evidence of acute intracranial abnormality. Clinical correlation and further evaluation as warranted. [JN]      ED Course User Index  [JN] Jarek Rizo MD                  48 year old F w/ PMH of cervical central canal stenosis with cervical myelomalacia working with physical therapy and following Dr. Crowe with Orthopedic surgery with plans for surgical spinal intervention, last visit was on 6/10/24 presents to the ED today due to syncopal episode.    Upon interview of the patient, the patient sister, patient had a syncopal episode and landing on the back of her head while outside. No n/v/d, fevers or chills.     Patient denies urinary incontinence, decreased groin sensation, or tongue pain. Patient reports that she does not remember what happened. Patient denies any bleeding but is on her period. No recent illness. Unknown last tetanus shot. Patient reports baseline bilateral LE twitching with tonic movements. Patient reports baseline RLE numbness.   Vitals notable for:Afebrile, hypotensive with VS on initial presentation in the 60s-->on repeat SBP  in the 90s, bradycardic at 55  Physical exam notable for: as above  Ddx include but not limited to: progressive worsening of known cervical stenosis. Cauda equina  syndrome, spinal shock, seizure, hypoglycemia, electrolyte abnormalities, arrhythmia, intracranial/spinal fracture/head bleed, ACS/MI    Basic labs ordered including: CBC, CMP, Mg, cardiac enzymes, CK, LA, UA, CXR, CTCS, CTH, Tdap, 1L of NS and lidocaine ordered by bedside.     Case discussed with Dr. Valentni Rizo  Emergency medicine PGY3    Workup notable for:  No leukocytosis.  Hemoglobin stable at 14.7.  Platelets within normal range  No major electrolyte abnormalities, corrected Ca 8.4  Grossly normal range  No OCTAVIO  No elevated liver enzymes    CK wnl  Initial HST wnl   BNP wnl    POCT glucose 104  Istate lactate 1.84    EK bpm, sinus bradycardia, NAD, no pr prolongation, QRS widening or Qtc prolongation. No acute ischemic findings. No Brugada, delta wave, Epsilon wave, No LBBB or RBBB    CTH:  No CT evidence of acute intracranial abnormality. Clinical correlation and further evaluation as warranted.     CTCS:  No CT evidence of acute fracture or traumatic subluxation of the cervical spine. Degenerative changes at C5-C6 and C6-C7 with associated spinal canal stenosis and neural foraminal narrowing     Given that the patient is afebrile, had a positive response to fluids currently SBP in the 120s, overall HDS, mentation back to baseline per the patient's sister, tolerating po intake, and walking with her cane at baseline, patient is stable to discharge to home with PCP follow up for suture removal in 7-10 days. ED and caudal equina precautions given. Questions answered and patient voiced understanding.Rx for mupirocin sent to pharmacy. Instruct patient to follow up with her NSGY as well.    Jarek Rizo  Emergency medicine PGY3      Clinical Impression:                  Jarek Rizo MD  Resident  [phone]

## 2024-06-30 NOTE — DISCHARGE INSTRUCTIONS
Please follow up with your primary care provider for two suture removal in 7-10 days.    Return to the ED if develop early rupture of your sutures, develop fevers, discharge/pus from your wound or develop any other acute symptoms (such as inability to control your bowels or bladder that is new from baseline, numbness in your feet or decreased sensation in your groin).    Please follow up with your Neurosurgeon for your neurologic symptoms.

## 2024-07-01 LAB
OHS QRS DURATION: 70 MS
OHS QTC CALCULATION: 400 MS

## 2024-07-02 ENCOUNTER — HOSPITAL ENCOUNTER (OUTPATIENT)
Dept: PREADMISSION TESTING | Facility: HOSPITAL | Age: 49
Discharge: HOME | End: 2024-07-02
Attending: ORTHOPAEDIC SURGERY
Payer: MEDICAID

## 2024-07-02 DIAGNOSIS — M48.02 FORAMINAL STENOSIS OF CERVICAL REGION: ICD-10-CM

## 2024-07-02 DIAGNOSIS — M50.30 DISC DISEASE, DEGENERATIVE, CERVICAL: ICD-10-CM

## 2024-07-02 DIAGNOSIS — G95.89 CERVICAL CORD MYELOMALACIA: ICD-10-CM

## 2024-07-02 LAB
ABO + RH BLD: NORMAL
ANION GAP SERPL CALC-SCNC: 8 MMOL/L (ref 8–16)
BACTERIA #/AREA URNS HPF: ABNORMAL /HPF
BASOPHILS # BLD AUTO: 0.03 K/UL (ref 0–0.2)
BASOPHILS NFR BLD: 0.5 % (ref 0–1.9)
BILIRUB UR QL STRIP: NEGATIVE
BLD GP AB SCN CELLS X3 SERPL QL: NORMAL
BUN SERPL-MCNC: 8 MG/DL (ref 6–20)
CALCIUM SERPL-MCNC: 8.9 MG/DL (ref 8.7–10.5)
CHLORIDE SERPL-SCNC: 111 MMOL/L (ref 95–110)
CLARITY UR: CLEAR
CO2 SERPL-SCNC: 23 MMOL/L (ref 23–29)
COLOR UR: YELLOW
CREAT SERPL-MCNC: 0.8 MG/DL (ref 0.5–1.4)
DIFFERENTIAL METHOD BLD: ABNORMAL
EOSINOPHIL # BLD AUTO: 0.1 K/UL (ref 0–0.5)
EOSINOPHIL NFR BLD: 2.4 % (ref 0–8)
ERYTHROCYTE [DISTWIDTH] IN BLOOD BY AUTOMATED COUNT: 14.6 % (ref 11.5–14.5)
EST. GFR  (NO RACE VARIABLE): >60 ML/MIN/1.73 M^2
GLUCOSE SERPL-MCNC: 81 MG/DL (ref 70–110)
GLUCOSE UR QL STRIP: NEGATIVE
HCT VFR BLD AUTO: 37.4 % (ref 37–48.5)
HGB BLD-MCNC: 12.9 G/DL (ref 12–16)
HGB UR QL STRIP: ABNORMAL
IMM GRANULOCYTES # BLD AUTO: 0.01 K/UL (ref 0–0.04)
IMM GRANULOCYTES NFR BLD AUTO: 0.2 % (ref 0–0.5)
KETONES UR QL STRIP: NEGATIVE
LEUKOCYTE ESTERASE UR QL STRIP: NEGATIVE
LYMPHOCYTES # BLD AUTO: 2.5 K/UL (ref 1–4.8)
LYMPHOCYTES NFR BLD: 41.9 % (ref 18–48)
MCH RBC QN AUTO: 31.7 PG (ref 27–31)
MCHC RBC AUTO-ENTMCNC: 34.5 G/DL (ref 32–36)
MCV RBC AUTO: 92 FL (ref 82–98)
MICROSCOPIC COMMENT: ABNORMAL
MONOCYTES # BLD AUTO: 0.4 K/UL (ref 0.3–1)
MONOCYTES NFR BLD: 7.5 % (ref 4–15)
NEUTROPHILS # BLD AUTO: 2.8 K/UL (ref 1.8–7.7)
NEUTROPHILS NFR BLD: 47.5 % (ref 38–73)
NITRITE UR QL STRIP: NEGATIVE
NRBC BLD-RTO: 0 /100 WBC
PH UR STRIP: 6 [PH] (ref 5–8)
PLATELET # BLD AUTO: 219 K/UL (ref 150–450)
PMV BLD AUTO: 9.9 FL (ref 9.2–12.9)
POTASSIUM SERPL-SCNC: 3.4 MMOL/L (ref 3.5–5.1)
PROT UR QL STRIP: NEGATIVE
RBC # BLD AUTO: 4.07 M/UL (ref 4–5.4)
RBC #/AREA URNS HPF: 4 /HPF (ref 0–4)
SODIUM SERPL-SCNC: 142 MMOL/L (ref 136–145)
SP GR UR STRIP: 1.02 (ref 1–1.03)
SPECIMEN OUTDATE: NORMAL
SQUAMOUS #/AREA URNS HPF: 9 /HPF
URN SPEC COLLECT METH UR: ABNORMAL
UROBILINOGEN UR STRIP-ACNC: NEGATIVE EU/DL
WBC # BLD AUTO: 5.85 K/UL (ref 3.9–12.7)
WBC #/AREA URNS HPF: 5 /HPF (ref 0–5)
YEAST URNS QL MICRO: ABNORMAL

## 2024-07-02 PROCEDURE — 36415 COLL VENOUS BLD VENIPUNCTURE: CPT | Performed by: ORTHOPAEDIC SURGERY

## 2024-07-02 PROCEDURE — 86900 BLOOD TYPING SEROLOGIC ABO: CPT | Performed by: ORTHOPAEDIC SURGERY

## 2024-07-02 PROCEDURE — 80048 BASIC METABOLIC PNL TOTAL CA: CPT | Performed by: ORTHOPAEDIC SURGERY

## 2024-07-02 PROCEDURE — 86850 RBC ANTIBODY SCREEN: CPT | Performed by: ORTHOPAEDIC SURGERY

## 2024-07-02 PROCEDURE — 81000 URINALYSIS NONAUTO W/SCOPE: CPT | Performed by: ORTHOPAEDIC SURGERY

## 2024-07-02 PROCEDURE — 86901 BLOOD TYPING SEROLOGIC RH(D): CPT | Performed by: ORTHOPAEDIC SURGERY

## 2024-07-02 PROCEDURE — 85025 COMPLETE CBC W/AUTO DIFF WBC: CPT | Performed by: ORTHOPAEDIC SURGERY

## 2024-07-02 NOTE — DISCHARGE INSTRUCTIONS
To confirm, Your doctor has instructed you that surgery is scheduled for: 7/9/24 with Dr. Crowe    Please report to [facility], Registration the morning of surgery. You must check-in and receive a wristband before going to your procedure.  [address] [place] [address] [place] [address]    Pre-Op will call the afternoon prior to surgery between 1:00 and 6:00 PM with the final arrival time.  Phone number: [phone]    PLEASE NOTE:  The surgery schedule has many variables which may affect the time of your surgery case.  Family members should be available if your surgery time changes.  Plan to be here the day of your procedure between 4-6 hours.    MEDICATIONS:  TAKE ONLY THESE MEDICATIONS WITH A SMALL SIP OF WATER THE MORNING OF YOUR PROCEDURE:  See list      DO NOT TAKE THESE MEDICATIONS 5-7 DAYS PRIOR to your procedure or per your surgeon's request:   ASPIRIN, ALEVE, ADVIL, IBUPROFEN, FISH OIL VITAMIN E, HERBALS  (May take Tylenol)    ONLY if you are prescribed any types of blood thinners such as:  Aspirin, Coumadin, Plavix, Pradaxa, Xarelto, Aggrenox, Effient, Eliquis, Savasya, Brilinta, or any other, ask your surgeon whether you should stop taking them and how long before surgery you should stop.  You may also need to verify with the prescribing physician if it is ok to stop your medication.      INSTRUCTIONS IMPORTANT!!  Do not eat or drink anything between midnight and the time of your procedure- this includes gum, mints, and candy.  Do not smoke or drink alcoholic beverages 24 hours prior to your procedure.  Shower the night before AND the morning of your procedure with a Chlorhexidine wash such as Hibiclens or Dial antibacterial soap from the neck down.  Do not get it on your face or in your eyes.  You may use your own shampoo and face wash. This helps your skin to be as bacteria free as possible.    If you wear contact lenses, dentures, hearing aids or glasses, bring a container to put them  in during surgery and give to a family member for safe keeping.  Please leave all jewelry, piercing's and valuables at home. You must remove your false eyelashes prior to surgery.    DO NOT remove hair from the surgery site.  Do not shave the incision site unless you are given specific instructions to do so.    ONLY if you have been diagnosed with sleep apnea please bring your C-PAP machine.  ONLY if you wear home oxygen please bring your portable oxygen tank the day of your procedure.  ONLY if you have a history of OPEN HEART SURGERY you will need a clearance from your Cardiologist per Anesthesia.      ONLY for patients requiring bowel prep, written instructions will be given by your doctor's office.  ONLY if you have a neuro stimulator, please bring the controller with you the morning of surgery  ONLY if a type and screen test is needed before surgery, please return:  If your doctor has scheduled you for an overnight stay, bring a small overnight bag with any personal items you need.  Make arrangements in advance for transportation home by a responsible adult. You can not go home in an uber or a cab per hospital policy.  It is not safe to drive a vehicle during the 24 hours after anesthesia.          All  facilities and properties are tobacco free.  Smoking is NOT allowed.   If you have any questions about these instructions, call Pre-Op Admit  Nursing at [phone] or the Pre-Op Day Surgery Unit at [phone].

## 2024-07-03 ENCOUNTER — CLINICAL SUPPORT (OUTPATIENT)
Dept: REHABILITATION | Facility: HOSPITAL | Age: 49
End: 2024-07-03
Payer: MEDICAID

## 2024-07-03 DIAGNOSIS — M54.2 NECK PAIN: Primary | ICD-10-CM

## 2024-07-03 NOTE — PROGRESS NOTES
Pt arrived for PT.She she fell and hit her head on cement patio.She suffered laceration that required repair at ED.6/29/24.  Hold PT at this time.  Pt will require new order or medical clearance to resume PT.  Pt is scheduled for [facility] with Dr Hernandez on 7/9/24.

## 2024-07-07 ENCOUNTER — PATIENT MESSAGE (OUTPATIENT)
Dept: ORTHOPEDICS | Facility: CLINIC | Age: 49
End: 2024-07-07
Payer: MEDICAID

## 2024-07-10 ENCOUNTER — PATIENT MESSAGE (OUTPATIENT)
Dept: REHABILITATION | Facility: HOSPITAL | Age: 49
End: 2024-07-10
Payer: MEDICAID

## 2024-07-11 ENCOUNTER — TELEPHONE (OUTPATIENT)
Dept: FAMILY MEDICINE | Facility: CLINIC | Age: 49
End: 2024-07-11
Payer: MEDICAID

## 2024-07-12 ENCOUNTER — CLINICAL SUPPORT (OUTPATIENT)
Dept: REHABILITATION | Facility: HOSPITAL | Age: 49
End: 2024-07-12
Payer: MEDICAID

## 2024-07-12 DIAGNOSIS — M54.2 NECK PAIN: Primary | ICD-10-CM

## 2024-07-12 PROCEDURE — 97110 THERAPEUTIC EXERCISES: CPT | Mod: PO

## 2024-07-12 NOTE — PROGRESS NOTES
[facility]  Outpatient Physical Therapy Daily Treatment Note      Name: Zhang Kurtz  Clinic Number: [id number]  Visit Date: 7/12/2024    Therapy Diagnosis:   Neck pain.      Physician: Tim Crowe MD     Physician Orders: PT Eval and Treat   Medical Diagnosis from Referral: Cervical spine myelomalacia.  Evaluation Date: 6/12/2024  Authorization Period Expiration: 6/10/25  Plan of Care Expiration: 8/23/24  Progress Note Due: 7/10/24  Date of Surgery: NA  Visit # / Visits authorized: 2/ 8  FOTO: 1/ 3     Precautions: Standard and Fall      Time In: 0919  Time Out: 0957  Total Billable Time: 38 minutes     Subjective     Pt received clearance from Dr Crowe office to restart PT following syncope episode and head trauma.I read the clearance on pt's [facility] portal.     Response to previous treatment: no change  Functional change: No    Pain:  0 /10  Location:  lower extremities     Objective     Zhang received therapeutic exercises to develop strength, endurance, ROM, flexibility, posture, and core stabilization for 38 minutes including:    Bike 10 minutes   UBE 5/5'  OH pulley 3'  Shoulder shrugs 20, retro rolls 20, scap retractions 20  ROM c/spine 3 x LR/RR, SBL/RT    Not performed below.  NBOS on air airex EO and EC 3 x 30 sec each   Single leg stance 30 sec each side   Resisted lateral walks with OTB 10 ft x 4   Monster walks with OTB 10 ft x 3 FWD only   Step up on middle step 10 x each leg   Shuttle 62 # 3 x 10       Patient Education and HEP     She was not compliant with home exercise program.    Education provided:   -     Written Home Exercises Provided:    .  Exercises were reviewed and Zhang was able to demonstrate them prior to the end of the session.  Zhang demonstrated     understanding of the education provided.     See EMR under  for exercises provided .    Assessment     The patient presented to clinic engaged and willing to participate  with therapy.. Will continue to progress as able.   Tremor of BLE observed while on bike.  Pt will continue to benefit from skilled outpatient physical therapy to address the deficits listed in the problem list box on initial evaluation, provide pt/family education and to maximize pt's level of independence in the home and community environment.     Zhang Is progressing well towards her goals.   Pt prognosis is Fair.     Pt's spiritual, cultural and educational needs considered and pt agreeable to plan of care and goals.    Anticipated barriers to physical therapy: severity/ chronicity of condition     Goals: Goals:  SHORT TERM GOALS:  3 weeks  Progress Date met   Recent signs and systems trend is improving in order to progress towards Long term goals.  [] Met  [] Not Met  [] Progressing     Patient will be independent with Home Exercise Program  in order to further progress and return to maximal function. [] Met  [] Not Met  [] Progressing     Pain rating at Worst: 5 /10 in order to progress towards increased independence with activity. [] Met  [] Not Met  [] Progressing     Patient will be able to correct postural deviations in sitting and standing, to decrease pain and promote postural awareness for injury prevention.  [] Met  [] Not Met  [] Progressing     Patient will improve functional outcome (FOTO) score: by 5% to increase self-worth & perceived functional ability towards long term goals [] Met  [] Not Met  [] Progressing        LONG TERM GOALS: 6 weeks  Progress Date met   Patient will return to normal activites of daily living, recreational, and work related activities with less pain and limitation.  [] Met  [] Not Met  [] Progressing     Patient will improve range of motion  to stated goals in order to return to maximal functional potential. ROM of c/spine WFL/WNL.Strength of c/spine 5/5 in all planes. [] Met  [] Not Met  [] Progressing     Patient will improve Strength to stated goals of appropriate  musculature in order to improve functional independence.  [] Met  [] Not Met  [] Progressing     Pain Rating at Best: 1/10 to improve Quality of Life.  [] Met  [] Not Met  [] Progressing     Patient will meet predicted functional outcome (FOTO) score: 50% to increase self-worth & perceived functional ability. [] Met  [] Not Met  [] Progressing     Patient will have met/partially met personal goal of: improve strength and function. [] Met  [] Not Met  [] Progressing          Plan     Continue with the plan of care established per initial evaluation    Guilherme Michelle, PT

## 2024-07-16 ENCOUNTER — PATIENT MESSAGE (OUTPATIENT)
Dept: ORTHOPEDICS | Facility: CLINIC | Age: 49
End: 2024-07-16
Payer: MEDICAID

## 2024-07-16 ENCOUNTER — CLINICAL SUPPORT (OUTPATIENT)
Dept: REHABILITATION | Facility: HOSPITAL | Age: 49
End: 2024-07-16
Payer: MEDICAID

## 2024-07-16 DIAGNOSIS — M54.2 NECK PAIN: Primary | ICD-10-CM

## 2024-07-16 DIAGNOSIS — G95.89 CERVICAL CORD MYELOMALACIA: Primary | ICD-10-CM

## 2024-07-16 DIAGNOSIS — M48.061 FORAMINAL STENOSIS OF LUMBAR REGION: ICD-10-CM

## 2024-07-16 PROCEDURE — 97110 THERAPEUTIC EXERCISES: CPT | Mod: PO

## 2024-07-16 NOTE — PROGRESS NOTES
[facility]  Outpatient Physical Therapy Daily Treatment Note      Name: Zhang Kurtz  Clinic Number: [id number]  Visit Date: 7/16/2024    Therapy Diagnosis:   Neck pain.      Physician: Tim Crowe MD     Physician Orders: PT Eval and Treat   Medical Diagnosis from Referral: Cervical spine myelomalacia.  Evaluation Date: 6/12/2024  Authorization Period Expiration: 6/10/25  Plan of Care Expiration: 8/23/24  Progress Note Due: 7/10/24  Date of Surgery: NA  Visit # / Visits authorized: 3/ 8  FOTO: 1/ 3     Precautions: Standard and Fall      Time In: 1125  Time Out: 1205  Total Billable Time: 38 minutes     Subjective     Pt received clearance from Dr Crowe office to restart PT following syncope episode and head trauma.I read the clearance on pt's [facility] portal.     Response to previous treatment: no change  Functional change: No    Pain:  0 /10  Location:  lower extremities     Objective     Zhang received therapeutic exercises to develop strength, endurance, ROM, flexibility, posture, and core stabilization for 38 minutes including:    Bike 10 minutes   UBE 5/5'  OH pulley 3'  Shoulder shrugs 30, retro rolls 30, scap retractions 30  ROM c/spine 3 x LR/RR, SBL/RT  // Bars  Mini squats 30    HR/TR 30.    Not performed below.  NBOS on air airex EO and EC 3 x 30 sec each   Single leg stance 30 sec each side   Resisted lateral walks with OTB 10 ft x 4   Monster walks with OTB 10 ft x 3 FWD only   Step up on middle step 10 x each leg   Shuttle 62 # 3 x 10       Patient Education and HEP     She was not compliant with home exercise program.    Education provided:   -     Written Home Exercises Provided:    .  Exercises were reviewed and Zhang was able to demonstrate them prior to the end of the session.  Zhang demonstrated     understanding of the education provided.     See EMR under  for exercises provided .    Assessment     The patient presented to  clinic engaged and willing to participate with therapy.. Will continue to progress as able.   Tremor of BLE observed while on bike.  Pt will continue to benefit from skilled outpatient physical therapy to address the deficits listed in the problem list box on initial evaluation, provide pt/family education and to maximize pt's level of independence in the home and community environment.     Zhang Is progressing well towards her goals.   Pt prognosis is Fair.     Pt's spiritual, cultural and educational needs considered and pt agreeable to plan of care and goals.    Anticipated barriers to physical therapy: severity/ chronicity of condition     Goals: Goals:  SHORT TERM GOALS:  3 weeks  Progress Date met   Recent signs and systems trend is improving in order to progress towards Long term goals.  [] Met  [] Not Met  [] Progressing     Patient will be independent with Home Exercise Program  in order to further progress and return to maximal function. [] Met  [] Not Met  [] Progressing     Pain rating at Worst: 5 /10 in order to progress towards increased independence with activity. [] Met  [] Not Met  [] Progressing     Patient will be able to correct postural deviations in sitting and standing, to decrease pain and promote postural awareness for injury prevention.  [] Met  [] Not Met  [] Progressing     Patient will improve functional outcome (FOTO) score: by 5% to increase self-worth & perceived functional ability towards long term goals [] Met  [] Not Met  [] Progressing        LONG TERM GOALS: 6 weeks  Progress Date met   Patient will return to normal activites of daily living, recreational, and work related activities with less pain and limitation.  [] Met  [] Not Met  [] Progressing     Patient will improve range of motion  to stated goals in order to return to maximal functional potential. ROM of c/spine WFL/WNL.Strength of c/spine 5/5 in all planes. [] Met  [] Not Met  [] Progressing     Patient will improve  Strength to stated goals of appropriate musculature in order to improve functional independence.  [] Met  [] Not Met  [] Progressing     Pain Rating at Best: 1/10 to improve Quality of Life.  [] Met  [] Not Met  [] Progressing     Patient will meet predicted functional outcome (FOTO) score: 50% to increase self-worth & perceived functional ability. [] Met  [] Not Met  [] Progressing     Patient will have met/partially met personal goal of: improve strength and function. [] Met  [] Not Met  [] Progressing          Plan     Continue with the plan of care established per initial evaluation    Guilherme Michelle, PT

## 2024-07-17 ENCOUNTER — OFFICE VISIT (OUTPATIENT)
Dept: FAMILY MEDICINE | Facility: CLINIC | Age: 49
End: 2024-07-17
Payer: MEDICAID

## 2024-07-17 VITALS
SYSTOLIC BLOOD PRESSURE: 102 MMHG | RESPIRATION RATE: 18 BRPM | DIASTOLIC BLOOD PRESSURE: 80 MMHG | HEIGHT: 58 IN | HEART RATE: 74 BPM | WEIGHT: 108 LBS | OXYGEN SATURATION: 98 % | BODY MASS INDEX: 22.67 KG/M2 | TEMPERATURE: 98 F

## 2024-07-17 DIAGNOSIS — E78.5 HYPERLIPIDEMIA, UNSPECIFIED HYPERLIPIDEMIA TYPE: ICD-10-CM

## 2024-07-17 DIAGNOSIS — M25.552 PAIN OF LEFT HIP: ICD-10-CM

## 2024-07-17 DIAGNOSIS — S06.0X1D CONCUSSION WITH LOSS OF CONSCIOUSNESS OF 30 MINUTES OR LESS, SUBSEQUENT ENCOUNTER: ICD-10-CM

## 2024-07-17 DIAGNOSIS — D35.2 PITUITARY MICROADENOMA WITH HYPERPROLACTINEMIA: Primary | ICD-10-CM

## 2024-07-17 DIAGNOSIS — I95.1 ORTHOSTATIC HYPOTENSION: ICD-10-CM

## 2024-07-17 DIAGNOSIS — E22.9 PITUITARY MICROADENOMA WITH HYPERPROLACTINEMIA: Primary | ICD-10-CM

## 2024-07-17 DIAGNOSIS — M51.9 LUMBAR DISC DISEASE: ICD-10-CM

## 2024-07-17 PROBLEM — S06.0X1A CONCUSSION WITH LOSS OF CONSCIOUSNESS OF 30 MINUTES OR LESS: Status: ACTIVE | Noted: 2024-07-17

## 2024-07-17 PROCEDURE — 3008F BODY MASS INDEX DOCD: CPT | Mod: CPTII,,, | Performed by: FAMILY MEDICINE

## 2024-07-17 PROCEDURE — 99999 PR PBB SHADOW E&M-EST. PATIENT-LVL IV: CPT | Mod: PBBFAC,,, | Performed by: FAMILY MEDICINE

## 2024-07-17 PROCEDURE — 99214 OFFICE O/P EST MOD 30 MIN: CPT | Mod: S$PBB,,, | Performed by: FAMILY MEDICINE

## 2024-07-17 PROCEDURE — 1159F MED LIST DOCD IN RCRD: CPT | Mod: CPTII,,, | Performed by: FAMILY MEDICINE

## 2024-07-17 PROCEDURE — 3074F SYST BP LT 130 MM HG: CPT | Mod: CPTII,,, | Performed by: FAMILY MEDICINE

## 2024-07-17 PROCEDURE — 99214 OFFICE O/P EST MOD 30 MIN: CPT | Mod: PBBFAC,PN | Performed by: FAMILY MEDICINE

## 2024-07-17 PROCEDURE — 3079F DIAST BP 80-89 MM HG: CPT | Mod: CPTII,,, | Performed by: FAMILY MEDICINE

## 2024-07-17 RX ORDER — METHOCARBAMOL 500 MG/1
TABLET, FILM COATED ORAL
COMMUNITY

## 2024-07-17 NOTE — PROGRESS NOTES
Subjective:       Patient ID: Zhang Kurtz is a 48 y.o. female.    Chief Complaint: Hyperlipidemia and Hip Pain      Is here for follow-up after a head injury she apparently became hypotensive and passed out hitting the back of her head was unconscious for less than a minute, apparently patient was overheated and heat   Exhaustion, patient has had mildly low blood pressures since then.  Still having some occipital headaches has had the staples out but wants her wound checked.  .  Patient has had chronic hip pain and wants re-evaluation.  Suffers with chronic low back pain and arthritis. X about one year.  Getting PT regularly.  Patient is seen Dr. Crowe in the past but 1 a referral to a different specialist.  Lab Results       Component                Value               Date                       WBC                      5.85                07/02/2024                 HGB                      12.9                07/02/2024                 HCT                      37.4                07/02/2024                 PLT                      219                 07/02/2024                 CHOL                     203 (H)             11/27/2023                 TRIG                     74                  11/27/2023                 HDL                      51                  11/27/2023                 ALT                      7 (L)               06/29/2024                 AST                      12                  06/29/2024                 NA                       142                 07/02/2024                 K                        3.4 (L)             07/02/2024                 CL                       111 (H)             07/02/2024                 CREATININE               0.8                 07/02/2024                 BUN                      8                   07/02/2024                 CO2                      23                  07/02/2024                 TSH                      1.396               11/27/2023                  HGBA1C                   4.8                 01/17/2023                    Hyperlipidemia  Pertinent negatives include no chest pain.   Hip Pain         Allergies and Medications:   Review of patient's allergies indicates:   Allergen Reactions    Codeine Nausea Only     Current Outpatient Medications   Medication Sig Dispense Refill    ergocalciferol (ERGOCALCIFEROL) 50,000 unit Cap Take 1 capsule (50,000 Units total) by mouth every 7 days. 12 capsule 3    meloxicam (MOBIC) 15 MG tablet Take 1 tablet (15 mg total) by mouth once daily. 30 tablet 0    methocarbamoL (ROBAXIN) 500 MG Tab 1.5 tablets Orally every 4 hrs      QUEtiapine (SEROQUEL) 50 MG tablet Take 1 tablet (50 mg total) by mouth every evening. 30 tablet 1    traMADoL (ULTRAM) 50 mg tablet Take 1 tablet (50 mg total) by mouth every 6 (six) hours as needed for Pain. 28 tablet 2    vibegron 75 mg Tab Take 1 tablet (75 mg total) by mouth once daily. 30 tablet 6     No current facility-administered medications for this visit.       Family History:   Family History   Problem Relation Name Age of Onset    Cancer Mother          lymph node CA    Asthma Daughter      Anesthesia problems Neg Hx      Clotting disorder Neg Hx         Social History:   Social History     Socioeconomic History    Marital status: Single    Number of children: 2   Occupational History    Occupation: administrative coordinatory   Tobacco Use    Smoking status: Every Day     Current packs/day: 1.00     Average packs/day: 1 pack/day for 10.0 years (10.0 ttl pk-yrs)     Types: Cigars, Cigarettes    Smokeless tobacco: Never    Tobacco comments:     cigars   Substance and Sexual Activity    Alcohol use: Yes     Alcohol/week: 1.0 standard drink of alcohol     Types: 1 Standard drinks or equivalent per week    Drug use: No    Sexual activity: Yes     Partners: Male     Birth control/protection: None   Social History Narrative    ** Merged History Encounter **          Social  Determinants of Health     Financial Resource Strain: High Risk (12/27/2023)    Overall Financial Resource Strain (CARDIA)     Difficulty of Paying Living Expenses: Very hard   Food Insecurity: Food Insecurity Present (12/27/2023)    Hunger Vital Sign     Worried About Running Out of Food in the Last Year: Often true     Ran Out of Food in the Last Year: Often true   Transportation Needs: No Transportation Needs (12/27/2023)    PRAPARE - Transportation     Lack of Transportation (Medical): No     Lack of Transportation (Non-Medical): No   Physical Activity: Inactive (12/27/2023)    Exercise Vital Sign     Days of Exercise per Week: 0 days     Minutes of Exercise per Session: 0 min   Stress: Stress Concern Present (12/27/2023)    [employer] [place] [employer] - Occupational Stress Questionnaire     Feeling of Stress : Very much   Housing Stability: High Risk (12/27/2023)    Housing Stability Vital Sign     Unable to Pay for Housing in the Last Year: Yes     Number of Places Lived in the Last Year: 1     Unstable Housing in the Last Year: No       Review of Systems   Constitutional:  Positive for unexpected weight change. Negative for activity change.   HENT:  Negative for hearing loss, rhinorrhea and trouble swallowing.    Eyes:  Negative for discharge and visual disturbance.   Respiratory:  Negative for chest tightness and wheezing.    Cardiovascular:  Negative for chest pain and palpitations.   Gastrointestinal:  Positive for constipation. Negative for blood in stool, diarrhea and vomiting.   Endocrine: Negative for polydipsia and polyuria.   Genitourinary:  Negative for difficulty urinating, dysuria, hematuria and menstrual problem.   Musculoskeletal:  Positive for arthralgias and neck pain. Negative for joint swelling.   Neurological:  Positive for weakness and headaches.   Psychiatric/Behavioral:  Positive for dysphoric mood. Negative for confusion.        Objective:     Vitals:    07/17/24 0936   BP:  102/80   Pulse: 74   Resp: 18   Temp: 98.3 °F (36.8 °C)        Physical Exam  HENT:      Head:     Musculoskeletal:        Back:          Assessment:       1. Pituitary microadenoma with hyperprolactinemia    2. Hyperlipidemia, unspecified hyperlipidemia type    3. Concussion with loss of consciousness of 30 minutes or less, subsequent encounter    4. Pain of left hip    5. Lumbar disc disease        Plan:       Zhang was seen today for hyperlipidemia and hip pain.    Diagnoses and all orders for this visit:    Pituitary microadenoma with hyperprolactinemia    Hyperlipidemia, unspecified hyperlipidemia type    Concussion with loss of consciousness of 30 minutes or less, subsequent encounter    Pain of left hip    Lumbar disc disease  -     Ambulatory referral/consult to Physical Medicine Rehab; Future         Follow up in about 6 months (around 1/17/2025), or With PCP, for annual.  My panel for new medicate his clothes.

## 2024-07-18 ENCOUNTER — CLINICAL SUPPORT (OUTPATIENT)
Dept: REHABILITATION | Facility: HOSPITAL | Age: 49
End: 2024-07-18
Payer: MEDICAID

## 2024-07-18 DIAGNOSIS — M54.2 NECK PAIN: Primary | ICD-10-CM

## 2024-07-18 PROCEDURE — 97110 THERAPEUTIC EXERCISES: CPT | Mod: PO

## 2024-07-18 NOTE — PROGRESS NOTES
[facility]  Outpatient Physical Therapy Daily Treatment Note      Name: Zhang Kurtz  Clinic Number: [id number]  Visit Date: 7/18/2024    Therapy Diagnosis:   Neck pain.      Physician: Tim Crowe MD     Physician Orders: PT Eval and Treat   Medical Diagnosis from Referral: Cervical spine myelomalacia.  Evaluation Date: 6/12/2024  Authorization Period Expiration: 6/10/25  Plan of Care Expiration: 8/23/24  Progress Note Due: 7/10/24  Date of Surgery: NA  Visit # / Visits authorized: 4/ 8  FOTO: 1/ 3     Precautions: Standard and Fall      Time In: 1000  Time Out: 1038  Total Billable Time: 38 minutes     Subjective     Pt received clearance from Dr Crowe office to restart PT following syncope episode and head trauma.I read the clearance on pt's [facility] portal.     Response to previous treatment: no change  Functional change: No    Pain:  0 /10  Location:  lower extremities     Objective     Zhang received therapeutic exercises to develop strength, endurance, ROM, flexibility, posture, and core stabilization for 38 minutes including:    Bike 10 minutes   UBE 5/5'  OH pulley 3'  Shoulder shrugs 30, retro rolls 30, scap retractions 30  ROM c/spine 3 x LR/RR, SBL/RT  // Bars  Mini squats 30    HR/TR 30.    Not performed below.  NBOS on air airex EO and EC 3 x 30 sec each   Single leg stance 30 sec each side   Resisted lateral walks with OTB 10 ft x 4   Monster walks with OTB 10 ft x 3 FWD only   Step up on middle step 10 x each leg   Shuttle 62 # 3 x 10       Patient Education and HEP     She was not compliant with home exercise program.    Education provided:   -     Written Home Exercises Provided:    .  Exercises were reviewed and Zhang was able to demonstrate them prior to the end of the session.  Zhang demonstrated     understanding of the education provided.     See EMR under  for exercises provided .    Assessment     The patient presented to  clinic engaged and willing to participate with therapy.. Will continue to progress as able.   Tremor of BLE observed while on bike.  Pt will continue to benefit from skilled outpatient physical therapy to address the deficits listed in the problem list box on initial evaluation, provide pt/family education and to maximize pt's level of independence in the home and community environment.     Zhang Is progressing well towards her goals.   Pt prognosis is Fair.     Pt's spiritual, cultural and educational needs considered and pt agreeable to plan of care and goals.    Anticipated barriers to physical therapy: severity/ chronicity of condition     Goals: Goals:  SHORT TERM GOALS:  3 weeks  Progress Date met   Recent signs and systems trend is improving in order to progress towards Long term goals.  [] Met  [] Not Met  [x] Progressing     Patient will be independent with Home Exercise Program  in order to further progress and return to maximal function. [] Met  [] Not Met  [x] Progressing     Pain rating at Worst: 5 /10 in order to progress towards increased independence with activity. [] Met  [] Not Met  [x] Progressing     Patient will be able to correct postural deviations in sitting and standing, to decrease pain and promote postural awareness for injury prevention.  [] Met  [] Not Met  [x] Progressing     Patient will improve functional outcome (FOTO) score: by 5% to increase self-worth & perceived functional ability towards long term goals [] Met  [] Not Met  [x] Progressing        LONG TERM GOALS: 6 weeks  Progress Date met   Patient will return to normal activites of daily living, recreational, and work related activities with less pain and limitation.  [] Met  [] Not Met  [] Progressing     Patient will improve range of motion  to stated goals in order to return to maximal functional potential. ROM of c/spine WFL/WNL.Strength of c/spine 5/5 in all planes. [] Met  [] Not Met  [] Progressing     Patient will  improve Strength to stated goals of appropriate musculature in order to improve functional independence.  [] Met  [] Not Met  [] Progressing     Pain Rating at Best: 1/10 to improve Quality of Life.  [] Met  [] Not Met  [] Progressing     Patient will meet predicted functional outcome (FOTO) score: 50% to increase self-worth & perceived functional ability. [] Met  [] Not Met  [] Progressing     Patient will have met/partially met personal goal of: improve strength and function. [] Met  [] Not Met  [] Progressing          Plan     Continue with the plan of care established per initial evaluation    Guilherme Michelle, PT

## 2024-07-24 ENCOUNTER — CLINICAL SUPPORT (OUTPATIENT)
Dept: REHABILITATION | Facility: HOSPITAL | Age: 49
End: 2024-07-24
Payer: MEDICAID

## 2024-07-24 DIAGNOSIS — M54.2 NECK PAIN: Primary | ICD-10-CM

## 2024-07-24 PROCEDURE — 97110 THERAPEUTIC EXERCISES: CPT | Mod: PO

## 2024-07-24 NOTE — PROGRESS NOTES
[facility]  Outpatient Physical Therapy Daily Treatment Note      Name: Zhang Kurtz  Clinic Number: [id number]  Visit Date: 7/24/2024    Therapy Diagnosis:   Neck pain.      Physician: Tim Crowe MD     Physician Orders: PT Eval and Treat   Medical Diagnosis from Referral: Cervical spine myelomalacia.  Evaluation Date: 6/12/2024  Authorization Period Expiration: 6/10/25  Plan of Care Expiration: 8/23/24  Progress Note Due: 7/10/24  Date of Surgery: NA  Visit # / Visits authorized: 5/ 8  FOTO: 1/ 3     Precautions: Standard and Fall      Time In: 1125  Time Out: 1205  Total Billable Time: 38 minutes     Subjective     Pt received clearance from Dr Crowe office to restart PT following syncope episode and head trauma.I read the clearance on pt's [facility] portal.     Response to previous treatment: no change  Functional change: No    Pain:  0 /10  Location:  lower extremities     Objective     Zhang received therapeutic exercises to develop strength, endurance, ROM, flexibility, posture, and core stabilization for 38 minutes including:    Bike 10 minutes   UBE 5/5'  OH pulley 3'  Shoulder shrugs 30, retro rolls 30, scap retractions 30  ROM c/spine 3 x LR/RR, SBL/RT  // Bars  Mini squats 30    HR/TR 30.    Not performed below.  NBOS on air airex EO and EC 3 x 30 sec each   Single leg stance 30 sec each side   Resisted lateral walks with OTB 10 ft x 4   Monster walks with OTB 10 ft x 3 FWD only   Step up on middle step 10 x each leg   Shuttle 62 # 3 x 10       Patient Education and HEP       Education provided:   Posture ed.    Written Home Exercises Provided:    .  Exercises were reviewed and Zhang was able to demonstrate them prior to the end of the session.  Zhang demonstrated     understanding of the education provided.     See EMR under  for exercises provided .    Assessment     The patient presented to clinic engaged and willing to participate  with therapy.. Will continue to progress as able.   Tremor of BLE observed while on bike.  Pt will continue to benefit from skilled outpatient physical therapy to address the deficits listed in the problem list box on initial evaluation, provide pt/family education and to maximize pt's level of independence in the home and community environment.     Zhang Is progressing well towards her goals.   Pt prognosis is Fair.     Pt's spiritual, cultural and educational needs considered and pt agreeable to plan of care and goals.    Anticipated barriers to physical therapy: severity/ chronicity of condition     Goals: Goals:  SHORT TERM GOALS:  3 weeks  Progress Date met   Recent signs and systems trend is improving in order to progress towards Long term goals.  [] Met  [] Not Met  [x] Progressing     Patient will be independent with Home Exercise Program  in order to further progress and return to maximal function. [] Met  [] Not Met  [x] Progressing     Pain rating at Worst: 5 /10 in order to progress towards increased independence with activity. [] Met  [] Not Met  [x] Progressing     Patient will be able to correct postural deviations in sitting and standing, to decrease pain and promote postural awareness for injury prevention.  [] Met  [] Not Met  [x] Progressing     Patient will improve functional outcome (FOTO) score: by 5% to increase self-worth & perceived functional ability towards long term goals [] Met  [] Not Met  [x] Progressing        LONG TERM GOALS: 6 weeks  Progress Date met   Patient will return to normal activites of daily living, recreational, and work related activities with less pain and limitation.  [] Met  [] Not Met  [x] Progressing     Patient will improve range of motion  to stated goals in order to return to maximal functional potential. ROM of c/spine WFL/WNL.Strength of c/spine 5/5 in all planes. [] Met  [] Not Met  [x] Progressing     Patient will improve Strength to stated goals of  appropriate musculature in order to improve functional independence.  [] Met  [] Not Met  [x] Progressing     Pain Rating at Best: 1/10 to improve Quality of Life.  [] Met  [] Not Met  [x] Progressing     Patient will meet predicted functional outcome (FOTO) score: 50% to increase self-worth & perceived functional ability. [] Met  [] Not Met  [x] Progressing     Patient will have met/partially met personal goal of: improve strength and function. [] Met  [] Not Met  [x] Progressing          Plan     Continue with the plan of care established per initial evaluation    Guilherme Michelle, PT

## 2024-07-26 ENCOUNTER — CLINICAL SUPPORT (OUTPATIENT)
Dept: REHABILITATION | Facility: HOSPITAL | Age: 49
End: 2024-07-26
Payer: MEDICAID

## 2024-07-26 DIAGNOSIS — M54.2 NECK PAIN: Primary | ICD-10-CM

## 2024-07-26 PROCEDURE — 97110 THERAPEUTIC EXERCISES: CPT | Mod: PO

## 2024-07-26 NOTE — PROGRESS NOTES
[facility]  Outpatient Physical Therapy Daily Treatment Note      Name: Zhang Kurtz  Clinic Number: [id number]  Visit Date: 7/26/2024    Therapy Diagnosis:   Neck pain.      Physician: Tim Crowe MD     Physician Orders: PT Eval and Treat   Medical Diagnosis from Referral: Cervical spine myelomalacia.  Evaluation Date: 6/12/2024  Authorization Period Expiration: 6/10/25  Plan of Care Expiration: 8/23/24  Progress Note Due: 7/10/24  Date of Surgery: NA  Visit # / Visits authorized: 7/ 8  FOTO: 1/ 3     Precautions: Standard and Fall      Time In: 1046  Time Out: 1126  Total Billable Time: 38 minutes     Subjective     Pt received clearance from Dr Crowe office to restart PT following syncope episode and head trauma.I read the clearance on pt's [facility] portal.     Response to previous treatment: no change  Functional change: No    Pain:  0 /10  Location: NA    Objective     Zhang received therapeutic exercises to develop strength, endurance, ROM, flexibility, posture, and core stabilization for 40 minutes including:    Bike 10 minutes   UBE 5/5'  OH pulley 3'  Shoulder shrugs 30, retro rolls 30, scap retractions 30  ROM c/spine 3 x LR/RR, SBL/RT  // Bars  Mini squats 30    HR/TR 30.    Not performed below.  NBOS on air airex EO and EC 3 x 30 sec each   Single leg stance 30 sec each side   Resisted lateral walks with OTB 10 ft x 4   Monster walks with OTB 10 ft x 3 FWD only   Step up on middle step 10 x each leg   Shuttle 62 # 3 x 10       Patient Education and HEP       Education provided:   Posture ed.    Written Home Exercises Provided:    .  Exercises were reviewed and Zhang was able to demonstrate them prior to the end of the session.  Zhang demonstrated     understanding of the education provided.     See EMR under  for exercises provided .    Assessment     The patient presented to clinic engaged and willing to participate with therapy.. Will  continue to progress as able.   Tremor of BLE observed while on bike.  Pt will continue to benefit from skilled outpatient physical therapy to address the deficits listed in the problem list box on initial evaluation, provide pt/family education and to maximize pt's level of independence in the home and community environment.     Zhang Is progressing well towards her goals.   Pt prognosis is Fair.     Pt's spiritual, cultural and educational needs considered and pt agreeable to plan of care and goals.    Anticipated barriers to physical therapy: severity/ chronicity of condition     Goals: Goals:  SHORT TERM GOALS:  3 weeks  Progress Date met   Recent signs and systems trend is improving in order to progress towards Long term goals.  [] Met  [] Not Met  [x] Progressing     Patient will be independent with Home Exercise Program  in order to further progress and return to maximal function. [] Met  [] Not Met  [x] Progressing     Pain rating at Worst: 5 /10 in order to progress towards increased independence with activity. [] Met  [] Not Met  [x] Progressing     Patient will be able to correct postural deviations in sitting and standing, to decrease pain and promote postural awareness for injury prevention.  [] Met  [] Not Met  [x] Progressing     Patient will improve functional outcome (FOTO) score: by 5% to increase self-worth & perceived functional ability towards long term goals [] Met  [] Not Met  [x] Progressing        LONG TERM GOALS: 6 weeks  Progress Date met   Patient will return to normal activites of daily living, recreational, and work related activities with less pain and limitation.  [] Met  [] Not Met  [x] Progressing     Patient will improve range of motion  to stated goals in order to return to maximal functional potential. ROM of c/spine WFL/WNL.Strength of c/spine 5/5 in all planes. [] Met  [] Not Met  [x] Progressing     Patient will improve Strength to stated goals of appropriate musculature in  order to improve functional independence.  [] Met  [] Not Met  [x] Progressing     Pain Rating at Best: 1/10 to improve Quality of Life.  [] Met  [] Not Met  [x] Progressing     Patient will meet predicted functional outcome (FOTO) score: 50% to increase self-worth & perceived functional ability. [] Met  [] Not Met  [x] Progressing     Patient will have met/partially met personal goal of: improve strength and function. [] Met  [] Not Met  [x] Progressing          Plan     Continue with the plan of care established per initial evaluation    Guilherme Michelle, PT

## 2024-08-02 ENCOUNTER — CLINICAL SUPPORT (OUTPATIENT)
Dept: REHABILITATION | Facility: HOSPITAL | Age: 49
End: 2024-08-02
Payer: MEDICAID

## 2024-08-02 ENCOUNTER — PATIENT MESSAGE (OUTPATIENT)
Dept: ADMINISTRATIVE | Facility: HOSPITAL | Age: 49
End: 2024-08-02
Payer: MEDICAID

## 2024-08-02 DIAGNOSIS — G95.89 CERVICAL CORD MYELOMALACIA: ICD-10-CM

## 2024-08-02 DIAGNOSIS — M48.061 FORAMINAL STENOSIS OF LUMBAR REGION: ICD-10-CM

## 2024-08-02 DIAGNOSIS — M54.2 NECK PAIN: Primary | ICD-10-CM

## 2024-08-02 PROCEDURE — 97110 THERAPEUTIC EXERCISES: CPT | Mod: PO

## 2024-08-02 NOTE — PROGRESS NOTES
[facility]  Outpatient Physical Therapy Daily Treatment Note      Name: Zhang Kurtz  Clinic Number: [id number]  Visit Date: 8/2/2024    Therapy Diagnosis:   Neck pain.      Physician: Tim Crowe MD     Physician Orders: PT Eval and Treat   Medical Diagnosis from Referral: Cervical spine myelomalacia.  Evaluation Date: 6/12/2024  Authorization Period Expiration: 6/10/25  Plan of Care Expiration: 8/23/24  Progress Note Due: 7/10/24  Date of Surgery: NA  Visit # / Visits authorized: 8/ 8  FOTO: 1/ 3     Precautions: Standard and Fall      Time In: 1137 (late arrival)  Time Out: 1215  Total Billable Time: 38 minutes     Subjective     Moderate pain   Response to previous treatment: no change  Functional change: No    Pain:  4 /10  Location: neck    Objective     Zhang received therapeutic exercises to develop strength, endurance, ROM, flexibility, posture, and core stabilization for 38 minutes including:    Bike 10 minutes   UBE 5/5'  OH pulley 3'  Shoulder shrugs 30, retro rolls 30, scap retractions 30  ROM c/spine 3 x LR/RR, SBL/RT  // Bars  Mini squats 30    HR/TR 30.    Not performed below.  NBOS on air airex EO and EC 3 x 30 sec each   Single leg stance 30 sec each side   Resisted lateral walks with OTB 10 ft x 4   Monster walks with OTB 10 ft x 3 FWD only   Step up on middle step 10 x each leg   Shuttle 62 # 3 x 10       Patient Education and HEP       Education provided:   Posture ed.    Written Home Exercises Provided:    .  Exercises were reviewed and Zhang was able to demonstrate them prior to the end of the session.  Zhang demonstrated     understanding of the education provided.     See EMR under  for exercises provided .    Assessment      Will continue to progress as able.   Pt will continue to benefit from skilled outpatient physical therapy to address the deficits listed in the problem list box on initial evaluation, provide pt/family  education and to maximize pt's level of independence in the home and community environment.     Zhang Is progressing well towards her goals.   Pt prognosis is Fair.     Pt's spiritual, cultural and educational needs considered and pt agreeable to plan of care and goals.    Anticipated barriers to physical therapy: severity/ chronicity of condition     Goals: Goals:  SHORT TERM GOALS:  3 weeks  Progress Date met   Recent signs and systems trend is improving in order to progress towards Long term goals.  [] Met  [] Not Met  [x] Progressing     Patient will be independent with Home Exercise Program  in order to further progress and return to maximal function. [] Met  [] Not Met  [x] Progressing     Pain rating at Worst: 5 /10 in order to progress towards increased independence with activity. [] Met  [] Not Met  [x] Progressing     Patient will be able to correct postural deviations in sitting and standing, to decrease pain and promote postural awareness for injury prevention.  [] Met  [] Not Met  [x] Progressing     Patient will improve functional outcome (FOTO) score: by 5% to increase self-worth & perceived functional ability towards long term goals [] Met  [] Not Met  [x] Progressing        LONG TERM GOALS: 6 weeks  Progress Date met   Patient will return to normal activites of daily living, recreational, and work related activities with less pain and limitation.  [] Met  [] Not Met  [x] Progressing     Patient will improve range of motion  to stated goals in order to return to maximal functional potential. ROM of c/spine WFL/WNL.Strength of c/spine 5/5 in all planes. [] Met  [] Not Met  [x] Progressing     Patient will improve Strength to stated goals of appropriate musculature in order to improve functional independence.  [] Met  [] Not Met  [x] Progressing     Pain Rating at Best: 1/10 to improve Quality of Life.  [] Met  [] Not Met  [x] Progressing     Patient will meet predicted functional outcome (FOTO)  score: 50% to increase self-worth & perceived functional ability. [] Met  [] Not Met  [x] Progressing     Patient will have met/partially met personal goal of: improve strength and function. [] Met  [] Not Met  [x] Progressing          Plan     Continue with the plan of care established per initial evaluation, new POC next PT visit.    Guilherme Michelle, PT

## 2024-08-05 ENCOUNTER — CLINICAL SUPPORT (OUTPATIENT)
Dept: REHABILITATION | Facility: HOSPITAL | Age: 49
End: 2024-08-05
Payer: MEDICAID

## 2024-08-05 DIAGNOSIS — M54.2 NECK PAIN: Primary | ICD-10-CM

## 2024-08-05 PROCEDURE — 97110 THERAPEUTIC EXERCISES: CPT | Mod: PO,CQ

## 2024-08-07 ENCOUNTER — CLINICAL SUPPORT (OUTPATIENT)
Dept: REHABILITATION | Facility: HOSPITAL | Age: 49
End: 2024-08-07
Payer: MEDICAID

## 2024-08-07 DIAGNOSIS — M54.2 NECK PAIN: Primary | ICD-10-CM

## 2024-08-07 PROCEDURE — 97164 PT RE-EVAL EST PLAN CARE: CPT | Mod: PO

## 2024-08-07 PROCEDURE — 97110 THERAPEUTIC EXERCISES: CPT | Mod: PO

## 2024-08-13 ENCOUNTER — TELEPHONE (OUTPATIENT)
Dept: ORTHOPEDICS | Facility: CLINIC | Age: 49
End: 2024-08-13
Payer: MEDICAID

## 2024-08-13 DIAGNOSIS — M47.22 OSTEOARTHRITIS OF SPINE WITH RADICULOPATHY, CERVICAL REGION: ICD-10-CM

## 2024-08-13 DIAGNOSIS — G95.89 CERVICAL CORD MYELOMALACIA: ICD-10-CM

## 2024-08-13 RX ORDER — MELOXICAM 15 MG/1
15 TABLET ORAL DAILY
Qty: 30 TABLET | Refills: 0 | Status: SHIPPED | OUTPATIENT
Start: 2024-08-13

## 2024-08-14 ENCOUNTER — OFFICE VISIT (OUTPATIENT)
Dept: NEUROSURGERY | Facility: CLINIC | Age: 49
End: 2024-08-14
Payer: MEDICAID

## 2024-08-14 VITALS
HEIGHT: 58 IN | BODY MASS INDEX: 22.57 KG/M2 | DIASTOLIC BLOOD PRESSURE: 70 MMHG | RESPIRATION RATE: 18 BRPM | SYSTOLIC BLOOD PRESSURE: 118 MMHG | HEART RATE: 68 BPM

## 2024-08-14 DIAGNOSIS — G99.2 STENOSIS OF CERVICAL SPINE WITH MYELOPATHY: ICD-10-CM

## 2024-08-14 DIAGNOSIS — M48.02 CERVICAL SPINAL STENOSIS: Primary | ICD-10-CM

## 2024-08-14 DIAGNOSIS — M48.02 STENOSIS OF CERVICAL SPINE WITH MYELOPATHY: ICD-10-CM

## 2024-08-14 DIAGNOSIS — M51.9 LUMBAR DISC DISEASE: ICD-10-CM

## 2024-08-14 PROCEDURE — 1159F MED LIST DOCD IN RCRD: CPT | Mod: CPTII,,, | Performed by: STUDENT IN AN ORGANIZED HEALTH CARE EDUCATION/TRAINING PROGRAM

## 2024-08-14 PROCEDURE — 99205 OFFICE O/P NEW HI 60 MIN: CPT | Mod: S$PBB,,, | Performed by: STUDENT IN AN ORGANIZED HEALTH CARE EDUCATION/TRAINING PROGRAM

## 2024-08-14 PROCEDURE — 3074F SYST BP LT 130 MM HG: CPT | Mod: CPTII,,, | Performed by: STUDENT IN AN ORGANIZED HEALTH CARE EDUCATION/TRAINING PROGRAM

## 2024-08-14 PROCEDURE — 3078F DIAST BP <80 MM HG: CPT | Mod: CPTII,,, | Performed by: STUDENT IN AN ORGANIZED HEALTH CARE EDUCATION/TRAINING PROGRAM

## 2024-08-14 PROCEDURE — 3008F BODY MASS INDEX DOCD: CPT | Mod: CPTII,,, | Performed by: STUDENT IN AN ORGANIZED HEALTH CARE EDUCATION/TRAINING PROGRAM

## 2024-08-14 RX ORDER — SERTRALINE HYDROCHLORIDE 100 MG/1
100 TABLET, FILM COATED ORAL EVERY MORNING
COMMUNITY
Start: 2024-08-07

## 2024-08-14 RX ORDER — CLONAZEPAM 1 MG/1
1 TABLET ORAL 2 TIMES DAILY PRN
COMMUNITY
Start: 2024-08-07

## 2024-08-14 RX ORDER — CYPROHEPTADINE HYDROCHLORIDE 4 MG/1
4 TABLET ORAL
COMMUNITY
Start: 2024-08-07

## 2024-08-14 NOTE — PROGRESS NOTES
[facility] - [facility]  Clinic Consult     Consult Requested By: Ginny Palomino, NP, Self, Aaareferral        SUBJECTIVE:     Chief Complaint:   Chief Complaint   Patient presents with    Cervical Spine Pain (C-spine)     Pain radiates side to side and downward to both arms. She reports pain in her hips and well.       History of Present Illness:  Zhang Kurtz is a 49 y.o. female who presents with   Chronic issues with regard to diffuse osteoarthritis neck pain back pain hip pain;  However she has a ongoing history of a spastic gait, she states she has had history of black bladder and fecal incontinence  Numbness in the upper extremities as well as the lower extremities.  She uses cane assist to ambulate.  Decreased dexterity    This has been ongoing maybe had an exacerbation last year but otherwise relatively stable  She is currently in physical therapy which is helping  She would like a referral to pain management she had worked with a group of [facility] over time but is requesting a referral here      She has been evaluated by Dr. Crowe who recommended a cervical corpectomy C5-6 C6-7 ACDF, for spinal cord decompression  She decline and decided to seek another opinion    She has imaging from 2023 as well as from February 20, 2024  This shows disc osteophyte complex ligamentum buckling long her 2nd cord compression from C5-6 to C6-7 circumferentially  Myelomalacia/cord signal change                              Pertinent and Recent history, provider evaluations, imaging and data reviewed in EPIC       Diagnostic Results:  I have independently reviewed the following imaging:    C7-T1: No significant abnormalities.     IMPRESSION:  1. Multilevel cervical degenerative disc and facet disease as above. Of particular note, there is evidence of severe spinal stenosis at the C6-7 level secondary to broad-based disc/osteophyte complex, mildly increased compared to 2023. Slightly increasing  T2 hyperintensity within the substance of the cervical cord at this level is likely on the basis of myelomalacia.  2. Please see additional details as noted at each level above.     Electronically signed by:  Aniceto Chaudhari MD  02/11/2024 04:10 PM CST Workstation: [id number]           Specimen Collected: 02/11/24 15:13 CST Last Resulted: 02/11/24 16:10 CST               Review of patient's allergies indicates:   Allergen Reactions    Codeine Nausea Only       Past Medical History:   Diagnosis Date    Abnormal Pap smear     High grade squamous intraepithelial lesion.    Anxiety     Breast disorder     lumps removed in the past,  bilateral    Cervical nerve root impingement 11/27/2023    Depression     Dysplasia of cervix     s/p CKC    Fatty liver     noted on 1/12 USG    Foraminal stenosis of cervical region 12/06/2012    Foraminal stenosis of lumbar region 11/27/2023    Generalized anxiety disorder 10/17/2012    Generalized headaches     Hyperlipidemia     Increased prolactin level     Manic depressive disorder 10/17/2012    Osteoarthritis of spine with radiculopathy, cervical region 12/06/2012    Pituitary microadenoma with hyperprolactinemia     Vitamin D deficiency      Past Surgical History:   Procedure Laterality Date    BREAST BIOPSY      bening    CERVICAL CONIZATION   W/ LASER      EPIDURAL STEROID INJECTION       Family History   Problem Relation Name Age of Onset    Cancer Mother          lymph node CA    Asthma Daughter      Anesthesia problems Neg Hx      Clotting disorder Neg Hx       Social History     Tobacco Use    Smoking status: Every Day     Current packs/day: 1.00     Average packs/day: 1 pack/day for 10.0 years (10.0 ttl pk-yrs)     Types: Cigars, Cigarettes    Smokeless tobacco: Never    Tobacco comments:     cigars   Substance Use Topics    Alcohol use: Yes     Alcohol/week: 1.0 standard drink of alcohol     Types: 1 Standard drinks or equivalent per week    Drug use: No        Review of  Systems:      Constitutional: no fever, chills or night sweats. No abrupt changes in weight       OBJECTIVE:     Vital Signs (Most Recent):  Pulse: 68 (08/14/24 1353)  Resp: 18 (08/14/24 1353)  BP: 118/70 (08/14/24 1353)    Physical Exam:      General: well developed, well nourished, no distress. .  Mental Status: Awake, Alert, Oriented x 4  Language: No aphasia  Speech: No dysarthria  Head: normocephalic, atraumatic.  Neck: trachea midline, no JVD   Cardiovascular: no LE edema  Pulmonary: normal respirations, no signs of respiratory distress  Abdomen: soft, non-distended    Motor Strength:  No abnormal movements seen.     Strength  Deltoids Triceps Biceps Wrist Extension Wrist Flexion Hand  Interossei     Upper: R 5/5 5/5 5/5 5/5 5/5 4+/5 4+/5      L 5/5 5/5 5/5 5/5 5/5 4/5 4/5       Iliopsoas Quadriceps Knee  Flexion Tibialis  anterior Gastro- cnemius EHL  Foot Eversion Foot inversion   Lower: R 4+/5 5/5 5/5 5/5 5/5 5/5 5/5 5/5 5/5    L 4+/5 5/5 5/5 5/5 5/5 5/5 5/5 5/5 5/5     SILT,PP dec diffuse UE, LE      DTR's: 3 + and symmetric in UE and LE  Schmid: +  Clonus+      Gait: spastic with cane assist, independent                ASSESSMENT/PLAN:     Cervical spinal stenosis    Lumbar disc disease  -     Ambulatory referral/consult to Neurosurgery  -     Ambulatory referral/consult to Pain Clinic; Future; Expected date: 08/21/2024    Stenosis of cervical spine with myelopathy      49-year-old female with a history of cervical stenosis with myelopathy, chronic symptoms positive long track signs including spastic gait  She ambulates with a cane assist.  She has been counseled on these findings for her most recent MRI in February  Which shows circumferential stenosis C5-6 C6-7 disc osteophyte complexes with compression from the C5-6 to the C6-7 disc space  There is myelomalacia with cord signal change.  She has been working with physical therapy which does help improve her gait  Reviewed this in detail chronic  symptoms spastic gait and myelomalacia  She has been thoroughly educated on the structural problems, the surgical indication spinal cord decompression  Thoroughly counseled on the chronicity of some of the symptoms including her long track signs spastic gait dexterity issues,  Uncertain the correlation with her bowel and bladder which is chronic and unchanged she says she has managed with urology  We also discussed the likelihood that much this will not improve with surgery and that the goals for spinal cord decompression to prevent worsening and optimize her functional mobility over time    She was also counseled on SCI, risk fall trauma etc.    She does not want to proceed with surgery as well counseled  She will continue with physical therapy  She would like a referral to pain management for symptom management in addition she has back pain    She may seek another opinion  S/s needing more urgent medical eval or ed reviewed  Otherwise should she not proceed rec call for f/u 6 mo if she will f/u with us        The diagnosis, goals, limitations, risks and benefits of surgery and alternative treatment options where discussed at length (pros/cons). All questions/concerns were addressed. The patient has verbalized a good understanding of the diagnosis, the planned procedure, anticipated post-operative course, overall expectations, and risks including but not limited to:  Dysphagia, dysphonia, Cervical palsy, nerve root injury/paralysis, death, nerve injury leading to pain or neurological deficit, csf leak, vascular injury or serious bleeding/need for blood product transfusion/stroke, wrong level surgery, hardware/instrumenteation misplacement, chronic pain/failure to improve or worsening of symptoms, infection, pseudoarthrosis, hardware failure, need for further surgery at the same or different levels; medical (eg Heart attack, blood clot, infection) and anesthetic complications.           Dejon Garcia MD  Neurosurgery

## 2024-08-27 ENCOUNTER — PATIENT OUTREACH (OUTPATIENT)
Dept: ADMINISTRATIVE | Facility: HOSPITAL | Age: 49
End: 2024-08-27
Payer: MEDICAID

## 2024-08-27 ENCOUNTER — PATIENT MESSAGE (OUTPATIENT)
Dept: NEUROSURGERY | Facility: CLINIC | Age: 49
End: 2024-08-27
Payer: MEDICAID

## 2024-08-27 ENCOUNTER — PATIENT MESSAGE (OUTPATIENT)
Dept: ORTHOPEDICS | Facility: CLINIC | Age: 49
End: 2024-08-27
Payer: MEDICAID

## 2024-08-27 DIAGNOSIS — G95.89 CERVICAL CORD MYELOMALACIA: Primary | ICD-10-CM

## 2024-08-27 DIAGNOSIS — M48.02 CERVICAL SPINAL STENOSIS: ICD-10-CM

## 2024-08-27 DIAGNOSIS — M51.9 LUMBAR DISC DISEASE: Primary | ICD-10-CM

## 2024-10-22 ENCOUNTER — PATIENT MESSAGE (OUTPATIENT)
Dept: REHABILITATION | Facility: HOSPITAL | Age: 49
End: 2024-10-22
Payer: MEDICAID

## 2024-10-23 ENCOUNTER — PATIENT MESSAGE (OUTPATIENT)
Dept: FAMILY MEDICINE | Facility: CLINIC | Age: 49
End: 2024-10-23
Payer: MEDICAID

## 2025-01-14 ENCOUNTER — PATIENT MESSAGE (OUTPATIENT)
Dept: ADMINISTRATIVE | Facility: HOSPITAL | Age: 50
End: 2025-01-14
Payer: MEDICAID

## 2025-01-30 DIAGNOSIS — Z12.31 OTHER SCREENING MAMMOGRAM: ICD-10-CM

## 2025-02-03 NOTE — PROGRESS NOTES
Subjective:     Patient ID: Zhang Kurtz is a 49 y.o. female.    Chief Complaint: Low-back Pain    Ms Zhang is a 50 yo female sent by  for evaluation of gait instability and spasticity.  She was 33 minutes late for her 30 minute appointment but was seen.  She wants to know what can help with gait.  She feels like gait is about the same.  She has been falling, but she has not fallen in a month.  The legs are the same.  She does not take any medication.  She was given robaxin but did not take because a big pill.  She is not going to doctor.  There is hand cramping.  She does not feel like she is dropping thing.  She just feel like hands cramp.  The pain is the worst in back and hips.  There is not pain in the neck    MRI cervical 2/2024  MRI cervical spine without contrast     CLINICAL DATA: M 54.2, neck pain     FINDINGS: Multiplanar noncontrast imaging was performed. Comparison is made to January 2023.     There is no evidence of cervical fracture or osseous destructive lesion. Marrow signal changes at C5 and C6 are similar to the prior study and felt to be degenerative in nature. There is slight C5 retrolisthesis, chronic and unchanged. Alignment at all other levels is normal.     There is abnormal signal within the substance of the cervical spinal cord at the C6-7 level compatible with myelomalacia. Based on sagittal T2-weighted images, this is mildly increased compared to 2023.     C2-3: No significant abnormalities.     C3-4: No significant abnormalities.     C4-5: There is mild bilateral degenerative facet hypertrophy with mild bilateral foraminal stenosis. No significant central canal narrowing.     C5-6: Broad-based disc/osteophyte complex is similar to the prior study. This results in moderate spinal stenosis and mild compression of the ventral aspect of the cervical cord, not significantly changed. In combination with facet hypertrophy, there is severe right and moderate to severe left  foraminal stenosis, unchanged.     C6-7: Broad-based disc/osteophyte complex results in severe spinal stenosis, increased compared to the prior study, with associated cord compression and slightly increasing myelomalacia. Bilateral uncinate spurring causes severe bilateral foraminal stenosis, stable.     C7-T1: No significant abnormalities.     IMPRESSION:  1. Multilevel cervical degenerative disc and facet disease as above. Of particular note, there is evidence of severe spinal stenosis at the C6-7 level secondary to broad-based disc/osteophyte complex, mildly increased compared to 2023. Slightly increasing T2 hyperintensity within the substance of the cervical cord at this level is likely on the basis of myelomalacia.  2. Please see additional details as noted at each level above.    MRI lumbar 2/2024  TECHNIQUE: Multiple sagittal T1, T2, and is there are images of the lumbar spine were obtained. Axial T1 and T2-weighted images were also acquired.     Unless otherwise stated, incidental findings do not require dedicated follow-up imaging.     FINDINGS: There is normal alignment. All of the lumbar vertebral bodies are normal in height. There is no significant abnormal signal intensity within the bone marrow. The conus terminalis is somewhat low in position at the L2 level are otherwise unremarkable.     At the T12-L1 and L1-L2 and L2-L3 and L3-L4 levels there is no significant disc bulging. There is no spinal canal or foraminal encroachment.     At L4-L5 there is no significant disc bulging. There is facet joint arthropathy that produces mild bilateral foraminal narrowing. There is no central canal stenosis.     At L5-S1 there is moderate disc space narrowing. There is mild left paracentral disc bulging that indents the dural sac slightly but does not significantly narrow the dural sac. There is moderate right and mild left foraminal narrowing. The overall appearance is unchanged since the preceding study.      IMPRESSION:   Unremarkable MRI of the lumbar spine except for mild left paracentral disc bulging at L5-S1 and mild facet joint arthropathy as noted. There is no significant encroachment on the spinal canal or neural foramina at any level within the lumbar spine. The overall appearance is unchanged since the preceding study dated 1/17/2023       Past Medical History:  No date: Abnormal Pap smear      Comment:  High grade squamous intraepithelial lesion.  No date: Anxiety  No date: Breast disorder      Comment:  lumps removed in the past,  bilateral  11/27/2023: Cervical nerve root impingement  No date: Depression  No date: Dysplasia of cervix      Comment:  s/p CKC  No date: Fatty liver      Comment:  noted on 1/12 USG  12/06/2012: Foraminal stenosis of cervical region  11/27/2023: Foraminal stenosis of lumbar region  10/17/2012: Generalized anxiety disorder  No date: Generalized headaches  No date: Hyperlipidemia  No date: Increased prolactin level  10/17/2012: Manic depressive disorder  12/06/2012: Osteoarthritis of spine with radiculopathy, cervical   region  No date: Pituitary microadenoma with hyperprolactinemia  No date: Vitamin D deficiency    Past Surgical History:  No date: BREAST BIOPSY      Comment:  bening  No date: CERVICAL CONIZATION   W/ LASER  No date: EPIDURAL STEROID INJECTION    Review of patient's family history indicates:  Problem: Cancer      Relation: Mother          Name:               Age of Onset: (Not Specified)              Comment: lymph node CA  Problem: Asthma      Relation: Daughter          Name:               Age of Onset: (Not Specified)  Problem: Anesthesia problems      Relation: Neg Hx          Name:               Age of Onset: (Not Specified)  Problem: Clotting disorder      Relation: Neg Hx          Name:               Age of Onset: (Not Specified)      Social History    Socioeconomic History      Marital status: Single      Number of children: 2    Occupational History       Occupation: administrative coordinatory    Tobacco Use      Smoking status: Every Day        Packs/day: 1.00        Years: 1 pack/day for 10.0 years (10.0 ttl pk-yrs)        Types: Cigars, Cigarettes      Smokeless tobacco: Never      Tobacco comments: cigars    Substance and Sexual Activity      Alcohol use: Yes        Alcohol/week: 1.0 standard drink of alcohol        Types: 1 Standard drinks or equivalent per week      Drug use: No      Sexual activity: Yes        Partners: Male        Birth control/protection: None    Social History Narrative      ** Merged History Encounter **           Social Drivers of Health  Financial Resource Strain: High Risk (7/24/2024)      Received from [facility]      Overall Financial Resource Strain (CARDIA)          Difficulty of Paying Living Expenses: Very hard  Food Insecurity: Food Insecurity Present (7/24/2024)      Received from [facility]      Hunger Vital Sign          Worried About Running Out of Food in the Last Year: Often true          Ran Out of Food in the Last Year: Often true  Transportation Needs: No Transportation Needs (7/24/2024)      Received from [facility]      PRAPARE - Transportation          Lack of Transportation (Medical): No          Lack of Transportation (Non-Medical): No  Physical Activity: Inactive (7/24/2024)      Received from [facility]      Exercise Vital Sign          Days of Exercise per Week: 0 days          Minutes of Exercise per Session: 0 min  Stress: Stress Concern Present (7/24/2024)      Received from [facility]      [facility] - Occupational Stress Questionnaire          Feeling of Stress : Very much  Housing Stability: High Risk (12/27/2023)      Housing Stability Vital Sign          Unable to Pay for Housing in the Last Year: Yes          Number of Places Lived in the Last Year: 1          Unstable Housing in the Last Year: No    Current Outpatient Medications:  clonazePAM (KLONOPIN) 1 MG tablet,  Take 1 mg by mouth 2 (two) times daily as needed., Disp: , Rfl:   cyproheptadine (PERIACTIN) 4 mg tablet, Take 4 mg by mouth., Disp: , Rfl:   ergocalciferol (ERGOCALCIFEROL) 50,000 unit Cap, Take 1 capsule (50,000 Units total) by mouth every 7 days., Disp: 12 capsule, Rfl: 3  meloxicam (MOBIC) 15 MG tablet, Take 1 tablet (15 mg total) by mouth once daily. (Patient not taking: Reported on 8/14/2024), Disp: 30 tablet, Rfl: 0  methocarbamoL (ROBAXIN) 500 MG Tab, 1.5 tablets Orally every 4 hrs (Patient not taking: Reported on 8/14/2024), Disp: , Rfl:   QUEtiapine (SEROQUEL) 50 MG tablet, Take 1 tablet (50 mg total) by mouth every evening., Disp: 30 tablet, Rfl: 1  sertraline (ZOLOFT) 100 MG tablet, Take 100 mg by mouth every morning., Disp: , Rfl:   traMADoL (ULTRAM) 50 mg tablet, Take 1 tablet (50 mg total) by mouth every 6 (six) hours as needed for Pain. (Patient not taking: Reported on 8/14/2024), Disp: 28 tablet, Rfl: 2    No current facility-administered medications for this visit.      Review of patient's allergies indicates:   -- Codeine -- Nausea Only          Review of Systems   Constitutional: Negative for weight gain and weight loss.   Cardiovascular:  Negative for chest pain.   Respiratory:  Positive for shortness of breath.    Musculoskeletal:  Positive for back pain and muscle cramps. Negative for joint pain and joint swelling.   Gastrointestinal:  Negative for abdominal pain, bowel incontinence, nausea and vomiting.   Genitourinary:  Positive for bladder incontinence.   Neurological:  Positive for loss of balance, numbness and paresthesias (hands legs and feet).        Objective:     General: Zhang is well-developed, well-nourished, appears stated age, in no acute distress, alert and oriented to time, place and person.     General    Vitals reviewed.  Constitutional: She is oriented to person, place, and time. She appears well-developed and well-nourished.   HENT:   Head: Normocephalic and atraumatic.    Pulmonary/Chest: Effort normal.   Neurological: She is alert and oriented to person, place, and time. She exhibits abnormal muscle tone (comfort 1 + bilateral Le).   Psychiatric: She has a normal mood and affect. Her behavior is normal. Judgment and thought content normal.     General Musculoskeletal Exam   Gait: abnormal and circumducted     Right Ankle/Foot Exam     Tests   Heel Walk: able to perform  Tiptoe Walk: able to perform    Left Ankle/Foot Exam     Tests   Heel Walk: able to perform  Tiptoe Walk: able to perform  Back (L-Spine & T-Spine) / Neck (C-Spine) Exam     Spinal Sensation   Right Side Sensation  C-Spine Level: normal   L-Spine Level: normal  S-Spine Level: normal  Left Side Sensation  C-Spine Level: normal  L-Spine Level: normal  S-Spine Level: normal    Back (L-Spine & T-Spine) Tests   Right Side Tests  Straight leg raise:        Sitting SLR: > 70 degrees    Left Side Tests  Straight leg raise:       Sitting SLR: > 70 degrees      Other   She has no scoliosis .  Spinal Kyphosis:  Absent      Muscle Strength   Right Upper Extremity   Biceps: 5/5   Deltoid:  5/5  Triceps:  5/5  Wrist extension: 5/5   Finger Flexors:  5/5  Left Upper Extremity  Biceps: 5/5   Deltoid:  5/5  Triceps:  5/5  Wrist extension: 5/5   Finger Flexors:  5/5  Right Lower Extremity   Hip Flexion: 5/5   Quadriceps:  5/5   Anterior tibial:  5/5   EHL:  5/5  Left Lower Extremity   Hip Flexion: 5/5   Quadriceps:  5/5   Anterior tibial:  5/5   EHL:  5/5    Reflexes     Left Side  Biceps:  2+  Triceps:  2+  Brachioradialis:  2+  Achilles:  3+  Left Schmid's Sign:  present  Babinski Sign:  present  Ankle Clonus:  present  Quadriceps:  3+    Right Side   Biceps:  2+  Triceps:  2+  Brachioradialis:  2+  Achilles:  3+  Right Schmid's Sign:  present  Babinski Sign:  present  Ankle Clonus:  present  Quadriceps:  3+    Vascular Exam     Right Pulses        Carotid:                  2+    Left Pulses        Carotid:                   2+          Assessment:     1. Cervical spondylosis with myelopathy    2. Spasticity    3. Lumbar disc disease         Plan:     Orders Placed This Encounter    baclofen (LIORESAL) 10 MG tablet    meloxicam (MOBIC) 15 MG tablet     We discussed the spasticity and the gait changes.  We discussed muscle relaxers might help some of the symptoms but will not help problem.    She has been resistant to get neck surgery because might not fix gait.  We discussed might be in a wheelchair if do not do something  We discussed cervical myelopathy effects everything below bladder back hands and legs.    We discussed the benefits of therapy and exercise and continuing to move. We discussed therapy will not help spasticity  Baclofen 10mg po TID  Mobic 15mg po Qday  She is thinking about surgery and will follow up with surgeon    More than 50% of the total time  of 45 minutes was spent face to face in counseling on diagnosis and treatment options. I also counseled patient  on common and most usual side effect of prescribed medications. Preparing to see the patient (eg, review of tests), Obtaining and/or reviewing separately obtained history, documenting clinical information in the electronic or other health record, independently interpreting results (not separately reported) and communicating results to the patient/family/caregiver, or care coordination (not separately reported). I reviewed Primary care , and other specialty's notes to better coordinate patient's care. All questions were answered, and patient voiced understanding.           Follow-up: No follow-ups on file. If there are any questions prior to this, the patient was instructed to contact the office.

## 2025-02-04 ENCOUNTER — OFFICE VISIT (OUTPATIENT)
Dept: SPINE | Facility: CLINIC | Age: 50
End: 2025-02-04
Attending: PHYSICAL MEDICINE & REHABILITATION
Payer: MEDICAID

## 2025-02-04 VITALS
WEIGHT: 108 LBS | BODY MASS INDEX: 22.67 KG/M2 | HEART RATE: 78 BPM | DIASTOLIC BLOOD PRESSURE: 73 MMHG | HEIGHT: 58 IN | SYSTOLIC BLOOD PRESSURE: 144 MMHG | OXYGEN SATURATION: 100 % | RESPIRATION RATE: 19 BRPM

## 2025-02-04 DIAGNOSIS — M51.9 LUMBAR DISC DISEASE: ICD-10-CM

## 2025-02-04 DIAGNOSIS — R25.2 SPASTICITY: ICD-10-CM

## 2025-02-04 DIAGNOSIS — M47.12 CERVICAL SPONDYLOSIS WITH MYELOPATHY: Primary | ICD-10-CM

## 2025-02-04 PROCEDURE — 99999 PR PBB SHADOW E&M-EST. PATIENT-LVL IV: CPT | Mod: PBBFAC,,, | Performed by: PHYSICAL MEDICINE & REHABILITATION

## 2025-02-04 PROCEDURE — 1160F RVW MEDS BY RX/DR IN RCRD: CPT | Mod: CPTII,,, | Performed by: PHYSICAL MEDICINE & REHABILITATION

## 2025-02-04 PROCEDURE — 99214 OFFICE O/P EST MOD 30 MIN: CPT | Mod: PBBFAC | Performed by: PHYSICAL MEDICINE & REHABILITATION

## 2025-02-04 PROCEDURE — 99204 OFFICE O/P NEW MOD 45 MIN: CPT | Mod: S$PBB,,, | Performed by: PHYSICAL MEDICINE & REHABILITATION

## 2025-02-04 PROCEDURE — 3078F DIAST BP <80 MM HG: CPT | Mod: CPTII,,, | Performed by: PHYSICAL MEDICINE & REHABILITATION

## 2025-02-04 PROCEDURE — 1159F MED LIST DOCD IN RCRD: CPT | Mod: CPTII,,, | Performed by: PHYSICAL MEDICINE & REHABILITATION

## 2025-02-04 PROCEDURE — 3008F BODY MASS INDEX DOCD: CPT | Mod: CPTII,,, | Performed by: PHYSICAL MEDICINE & REHABILITATION

## 2025-02-04 PROCEDURE — 3077F SYST BP >= 140 MM HG: CPT | Mod: CPTII,,, | Performed by: PHYSICAL MEDICINE & REHABILITATION

## 2025-02-04 RX ORDER — BACLOFEN 10 MG/1
10 TABLET ORAL 3 TIMES DAILY
Qty: 90 TABLET | Refills: 2 | Status: SHIPPED | OUTPATIENT
Start: 2025-02-04

## 2025-02-04 RX ORDER — MELOXICAM 15 MG/1
15 TABLET ORAL DAILY
Qty: 30 TABLET | Refills: 2 | Status: SHIPPED | OUTPATIENT
Start: 2025-02-04

## 2025-02-05 ENCOUNTER — HOSPITAL ENCOUNTER (OUTPATIENT)
Dept: RADIOLOGY | Facility: HOSPITAL | Age: 50
Discharge: HOME | End: 2025-02-05
Payer: MEDICAID

## 2025-02-05 DIAGNOSIS — Z12.31 OTHER SCREENING MAMMOGRAM: ICD-10-CM

## 2025-02-05 PROCEDURE — 77063 BREAST TOMOSYNTHESIS BI: CPT | Mod: 26,,, | Performed by: RADIOLOGY

## 2025-02-05 PROCEDURE — 77063 BREAST TOMOSYNTHESIS BI: CPT | Mod: TC

## 2025-02-05 PROCEDURE — 77067 SCR MAMMO BI INCL CAD: CPT | Mod: 26,,, | Performed by: RADIOLOGY

## 2025-03-24 ENCOUNTER — PATIENT MESSAGE (OUTPATIENT)
Dept: FAMILY MEDICINE | Facility: CLINIC | Age: 50
End: 2025-03-24
Payer: MEDICAID

## 2025-04-02 ENCOUNTER — PATIENT MESSAGE (OUTPATIENT)
Dept: ADMINISTRATIVE | Facility: HOSPITAL | Age: 50
End: 2025-04-02
Payer: MEDICAID

## 2025-06-05 ENCOUNTER — PATIENT MESSAGE (OUTPATIENT)
Dept: NEUROSURGERY | Facility: CLINIC | Age: 50
End: 2025-06-05
Payer: MEDICAID

## 2025-06-06 ENCOUNTER — TELEPHONE (OUTPATIENT)
Dept: NEUROSURGERY | Facility: CLINIC | Age: 50
End: 2025-06-06
Payer: MEDICAID

## 2025-06-11 ENCOUNTER — OFFICE VISIT (OUTPATIENT)
Dept: NEUROSURGERY | Facility: CLINIC | Age: 50
End: 2025-06-11
Payer: MEDICAID

## 2025-06-11 VITALS
DIASTOLIC BLOOD PRESSURE: 77 MMHG | SYSTOLIC BLOOD PRESSURE: 138 MMHG | BODY MASS INDEX: 22.67 KG/M2 | RESPIRATION RATE: 18 BRPM | HEIGHT: 58 IN | WEIGHT: 108 LBS | HEART RATE: 69 BPM

## 2025-06-11 DIAGNOSIS — M48.02 STENOSIS OF CERVICAL SPINE WITH MYELOPATHY: ICD-10-CM

## 2025-06-11 DIAGNOSIS — M48.02 CERVICAL SPINAL STENOSIS: Primary | ICD-10-CM

## 2025-06-11 DIAGNOSIS — G99.2 STENOSIS OF CERVICAL SPINE WITH MYELOPATHY: ICD-10-CM

## 2025-06-11 PROCEDURE — 1159F MED LIST DOCD IN RCRD: CPT | Mod: CPTII,,, | Performed by: PHYSICIAN ASSISTANT

## 2025-06-11 PROCEDURE — 3008F BODY MASS INDEX DOCD: CPT | Mod: CPTII,,, | Performed by: PHYSICIAN ASSISTANT

## 2025-06-11 PROCEDURE — 3075F SYST BP GE 130 - 139MM HG: CPT | Mod: CPTII,,, | Performed by: PHYSICIAN ASSISTANT

## 2025-06-11 PROCEDURE — 99215 OFFICE O/P EST HI 40 MIN: CPT | Mod: S$PBB,,, | Performed by: PHYSICIAN ASSISTANT

## 2025-06-11 PROCEDURE — 3078F DIAST BP <80 MM HG: CPT | Mod: CPTII,,, | Performed by: PHYSICIAN ASSISTANT

## 2025-06-11 NOTE — PROGRESS NOTES
HPI:  Ms. Kurtz is a 49-year-old female returning for follow-up reporting worsening neck pain with pain into her arms as well as balance issues.  She was previously seen and recommended for ACDF by Dr. Garcia in August 2024.  She has tried physical therapy in the meantime without any lasting relief.  She has tried anti-inflammatories without relief.  No prior neck surgeries.    General: well developed, well nourished, no distress.   Neurologic: Alert and oriented. Thought content appropriate.  Cranial nerves: face symmetric, EOMI.   Motor Strength: Moves all extremities spontaneously with good tone. No abnormal movements seen.      Strength   Deltoids Triceps Biceps Wrist Extension Wrist Flexion Hand    Upper: R 5/5 5/5 5/5 5/5 5/5 5/5     L 5/5 5/5 5/5 5/5 5/5 5/5       Iliopsoas Quadriceps Knee  Flexion Tibialis  anterior Gastro- cnemius EHL   Lower: R 5/5 5/5 5/5 5/5 5/5 5/5     L 5/5 5/5 5/5 5/5 5/5 5/5        Radiculopathy: C5/6/7 radic bilat  Gait: antalgic; uses cane    Sensory: intact to light touch throughout  DTR's - 4 + and symmetric in UE and LE  Schmid: + bilat    Imaging:  MRI cervical spine without contrast     CLINICAL DATA: M 55.2, neck pain     FINDINGS: Multiplanar noncontrast imaging was performed. Comparison is made to January 2023.     There is no evidence of cervical fracture or osseous destructive lesion. Marrow signal changes at C5 and C6 are similar to the prior study and felt to be degenerative in nature. There is slight C5 retrolisthesis, chronic and unchanged. Alignment at all other levels is normal.     There is abnormal signal within the substance of the cervical spinal cord at the C6-7 level compatible with myelomalacia. Based on sagittal T2-weighted images, this is mildly increased compared to 2023.     C2-3: No significant abnormalities.     C3-4: No significant abnormalities.     C4-5: There is mild bilateral degenerative facet hypertrophy with mild bilateral foraminal  stenosis. No significant central canal narrowing.     C5-6: Broad-based disc/osteophyte complex is similar to the prior study. This results in moderate spinal stenosis and mild compression of the ventral aspect of the cervical cord, not significantly changed. In combination with facet hypertrophy, there is severe right and moderate to severe left foraminal stenosis, unchanged.     C6-7: Broad-based disc/osteophyte complex results in severe spinal stenosis, increased compared to the prior study, with associated cord compression and slightly increasing myelomalacia. Bilateral uncinate spurring causes severe bilateral foraminal stenosis, stable.     C7-T1: No significant abnormalities.     IMPRESSION:  1. Multilevel cervical degenerative disc and facet disease as above. Of particular note, there is evidence of severe spinal stenosis at the C6-7 level secondary to broad-based disc/osteophyte complex, mildly increased compared to 2023. Slightly increasing T2 hyperintensity within the substance of the cervical cord at this level is likely on the basis of myelomalacia.  2. Please see additional details as noted at each level above.     Electronically signed by:  Aniceto Chaudhari MD  02/11/2024 04:10 PM [facility] Workstation: [id number]    ASSESSMENT AND PLAN:    Mrs. Kurtz is a 49-year-old female returning with chief complaint of worsening neck pain into her arms with gait instability.  She reports having to use a cane all the time now due to balance issues she has tried physical therapy over the last few months and reports her physical therapist no longer works on her.  She has tried anti-inflammatories without relief.  She was previously seen by Dr. Garcia and recommended for ACDF due to cervical spinal stenosis and evidence of myelomalacia.  Prior cervical spine MRI independently reviewed again today.  She has a very bad neck worse from C5-C7 with degenerated/bulging discs, osteophytes, and facet arthropathy contributing to  central and foraminal narrowing with evidence of myelomalacia at C6-7 level.  She needs an updated cervical MRI and bending x-rays for surgical planning.  We discussed at length the dangerous nature of her spinal cord compression and how she should have this addressed asap.  She voiced understanding and agrees with the plan at this time.  She will follow up after testing for results.    This note was partially dictated using voice recognition software, so please excuse any errors that were not corrected.

## 2025-06-12 ENCOUNTER — HOSPITAL ENCOUNTER (OUTPATIENT)
Dept: RADIOLOGY | Facility: CLINIC | Age: 50
Discharge: HOME | End: 2025-06-12
Attending: PHYSICIAN ASSISTANT
Payer: MEDICAID

## 2025-06-12 ENCOUNTER — HOSPITAL ENCOUNTER (OUTPATIENT)
Dept: RADIOLOGY | Facility: HOSPITAL | Age: 50
Discharge: HOME | End: 2025-06-12
Attending: PHYSICIAN ASSISTANT
Payer: MEDICAID

## 2025-06-12 DIAGNOSIS — G99.2 STENOSIS OF CERVICAL SPINE WITH MYELOPATHY: ICD-10-CM

## 2025-06-12 DIAGNOSIS — M48.02 CERVICAL SPINAL STENOSIS: ICD-10-CM

## 2025-06-12 DIAGNOSIS — M48.02 STENOSIS OF CERVICAL SPINE WITH MYELOPATHY: ICD-10-CM

## 2025-06-12 PROCEDURE — 72050 X-RAY EXAM NECK SPINE 4/5VWS: CPT | Mod: TC,PO

## 2025-06-12 PROCEDURE — 72050 X-RAY EXAM NECK SPINE 4/5VWS: CPT | Mod: 26,,, | Performed by: RADIOLOGY

## 2025-07-03 ENCOUNTER — OFFICE VISIT (OUTPATIENT)
Dept: NEUROSURGERY | Facility: CLINIC | Age: 50
End: 2025-07-03
Payer: MEDICAID

## 2025-07-03 ENCOUNTER — HOSPITAL ENCOUNTER (OUTPATIENT)
Dept: RADIOLOGY | Facility: HOSPITAL | Age: 50
Discharge: HOME | End: 2025-07-03
Attending: PHYSICIAN ASSISTANT
Payer: MEDICAID

## 2025-07-03 VITALS
WEIGHT: 140 LBS | DIASTOLIC BLOOD PRESSURE: 80 MMHG | SYSTOLIC BLOOD PRESSURE: 124 MMHG | BODY MASS INDEX: 29.39 KG/M2 | HEIGHT: 58 IN | HEART RATE: 72 BPM | RESPIRATION RATE: 18 BRPM

## 2025-07-03 DIAGNOSIS — G95.89 MYELOMALACIA OF CERVICAL CORD: ICD-10-CM

## 2025-07-03 DIAGNOSIS — M48.02 STENOSIS OF CERVICAL SPINE WITH MYELOPATHY: ICD-10-CM

## 2025-07-03 DIAGNOSIS — M48.02 CERVICAL SPINAL STENOSIS: ICD-10-CM

## 2025-07-03 DIAGNOSIS — G99.2 STENOSIS OF CERVICAL SPINE WITH MYELOPATHY: ICD-10-CM

## 2025-07-03 DIAGNOSIS — M48.02 CERVICAL SPINAL STENOSIS: Primary | ICD-10-CM

## 2025-07-03 PROCEDURE — 3079F DIAST BP 80-89 MM HG: CPT | Mod: CPTII,,, | Performed by: STUDENT IN AN ORGANIZED HEALTH CARE EDUCATION/TRAINING PROGRAM

## 2025-07-03 PROCEDURE — 1159F MED LIST DOCD IN RCRD: CPT | Mod: CPTII,,, | Performed by: STUDENT IN AN ORGANIZED HEALTH CARE EDUCATION/TRAINING PROGRAM

## 2025-07-03 PROCEDURE — 99215 OFFICE O/P EST HI 40 MIN: CPT | Mod: S$PBB,,, | Performed by: STUDENT IN AN ORGANIZED HEALTH CARE EDUCATION/TRAINING PROGRAM

## 2025-07-03 PROCEDURE — 3074F SYST BP LT 130 MM HG: CPT | Mod: CPTII,,, | Performed by: STUDENT IN AN ORGANIZED HEALTH CARE EDUCATION/TRAINING PROGRAM

## 2025-07-03 PROCEDURE — 72141 MRI NECK SPINE W/O DYE: CPT | Mod: TC,PO

## 2025-07-03 PROCEDURE — 72141 MRI NECK SPINE W/O DYE: CPT | Mod: 26,,, | Performed by: RADIOLOGY

## 2025-07-03 PROCEDURE — 3008F BODY MASS INDEX DOCD: CPT | Mod: CPTII,,, | Performed by: STUDENT IN AN ORGANIZED HEALTH CARE EDUCATION/TRAINING PROGRAM

## 2025-07-03 RX ORDER — GABAPENTIN 300 MG/1
300 CAPSULE ORAL 3 TIMES DAILY
COMMUNITY

## 2025-07-03 NOTE — PROGRESS NOTES
Interval history 7/3/2025:    No new complaints today but continues with neck pain/balance issues/hand numbness. She is ready to sign up for surgery. New imaging for review today.     HPI:  Ms. Kurtz is a 49-year-old female returning for follow-up reporting worsening neck pain with pain into her arms as well as balance issues.  She was previously seen and recommended for ACDF by Dr. Garcia in August 2024.  She has tried physical therapy in the meantime without any lasting relief.  She has tried anti-inflammatories without relief.  No prior neck surgeries.    General: well developed, well nourished, no distress.   Neurologic: Alert and oriented. Thought content appropriate.  Cranial nerves: face symmetric, EOMI.   Motor Strength: Moves all extremities spontaneously with good tone. No abnormal movements seen.      Strength   Deltoids Triceps Biceps Wrist Extension Wrist Flexion Hand    Upper: R 5/5 5/5 5/5 5/5 5/5 4/5     L 5/5 5/5 5/5 5/5 5/5 4/5       Iliopsoas Quadriceps Knee  Flexion Tibialis  anterior Gastro- cnemius EHL   Lower: R 5/5 5/5 5/5 5/5 5/5 5/5     L 5/5 5/5 5/5 5/5 5/5 5/5        Radiculopathy: C5/6/7 radic bilat  Gait: antalgic; uses cane    Sensory: intact to light touch throughout  DTR's - 4 + and symmetric in UE and LE  Schmid: + bilat    Imaging:  EXAMINATION:  MRI CERVICAL SPINE WITHOUT CONTRAST     COMPARISON:  Plain films of the cervical spine dated 06/12/2025, CT dated 06/29/2024, MRI dated 02/11/2024     FINDINGS:  Vertebral column: Redemonstrated is significant degenerative change. The vertebral bodies maintain normal height. There is no fracture. There is marked disc space narrowing at the C5-6 and C6-7 levels where there is endplate osteophyte formation.  There is Modic type 1 endplate change at the C6-7 level and to a much lesser degree at C5-6 where there is mostly sclerosis.  The odontoid process is intact.  There is mild-to-moderate disc space narrowing at the C4-5 level.   Baseline marrow signal is normal.  There is trace retrolisthesis of C5 on C6 and C6 on C7 which is exaggerated by osteophyte formation.  There is trace anterolisthesis of C7 on T1, T1 on T2.     Spinal canal, cord, epidural space: The spinal canal is developmentally normal.  Degenerative disc and facet disease does result in spinal stenosis at the C5-6 and more so at the C6-7 level.  There is abnormal signal intensity in the cord particularly at C6-7 and to a lesser degree at C5-6 representing changes of myelomalacia.  These findings were present on prior MRI.     Findings by level:     C2-3: There is no spinal canal or significant foraminal stenosis.     C3-4: There is a mild disc bulge, mild uncovertebral spurring and facet joint arthropathy.  There is no spinal stenosis or cord compression.  Also there is no significant foraminal stenosis.     C4-5: There is disc space narrowing, right greater than left uncovertebral spurring and facet joint arthropathy with a mild disc osteophyte complex which narrows the subarachnoid space and contributes to borderline to mild spinal stenosis without cord compression.  There is moderate right and mild left foraminal stenosis without significant change.     C5-6: There is marked disc space narrowing, trace retrolisthesis of C5 on C6.  There is right greater than left uncovertebral spurring with bilateral facet joint arthropathy as well as a moderate broad disc protrusion/osteophyte eccentric to the right.  There is severe spinal stenosis, right greater than left lateral recess and foraminal stenosis without significant change.     C6-7: There is severe disc space narrowing, bilateral uncovertebral spurring and facet joint arthropathy.  Again there is a moderate broad disc protrusion/osteophyte resulting in severe spinal canal, bilateral lateral recess and foraminal stenosis without significant change.     C7-T1: There is marked left facet joint arthropathy.  There is no spinal  stenosis.  There is also left-sided uncovertebral spurring.  There is at least moderate left foraminal stenosis.     Soft tissues, other: The thyroid gland appears mildly prominent but is incompletely included on this study.  The prevertebral soft tissues are normal.  The airway is patent.     Impression:     1. There is multilevel degenerative disc and facet disease without significant change compared to the prior studies.  At both the C5-6 and C6-7 levels there is severe spinal canal, bilateral lateral recess and foraminal stenosis.  There is chronic cord compression with signal abnormality in the cord, most apparent at C6-7, representing changes of myelomalacia.  These findings were present previously.  2. At the C4-5 level there is borderline to mild spinal stenosis with moderate right and mild left foraminal stenosis.  3. At the C7-T1 level there is at least moderate left foraminal stenosis where there is left-sided facet joint arthropathy without change.        Electronically signed by:Enoch Hamlin MD  Date:                                            07/03/2025  Time:                                           13:53    ASSESSMENT AND PLAN:    Mrs. Kurtz is a 49-year-old female returning with chief complaint of worsening neck pain into her arms with gait instability.  She reports having to use a cane all the time now due to balance issues she has tried physical therapy over the last few months and reports her physical therapist no longer works on her.  She has tried anti-inflammatories without relief.  She was previously seen by Dr. Garcia and recommended for ACDF due to cervical spinal stenosis and evidence of myelomalacia.  New cervical spine MRI independently reviewed today.  She has a very bad neck worst from C5-C7 with degenerated/bulging discs, osteophytes, and facet arthropathy contributing to central and foraminal narrowing with evidence of myelomalacia at C6-7 level.  Overall, not significantly  different compared to prior imaging but she has a bad neck at baseline.  We discussed at length the dangerous nature of her spinal cord compression and how she should have this addressed asap.  I discussed risks/benefits/alternatives and her questions were encouraged/answered. She voiced understanding and wishes to proceed with surgery at this time with a C5-7 ACDF.      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 All questions/concerns were addressed. The patient has verbalized a good understanding of the diagnosis, the planned procedure, anticipated post-operative course, overall expectations, and risks including but not limited to:  Dysphagia, dysphonia, Cervical palsy, nerve root injury/paralysis, death, nerve injury leading to pain or neurological deficit, csf leak, vascular injury or serious bleeding/need for blood product transfusion/stroke, wrong level surgery, hardware/instrumenteation misplacement, chronic pain/failure to improve or worsening of symptoms, infection, pseudoarthrosis, hardware failure, need for further surgery at the same or different levels; medical (eg Heart attack, blood clot, infection) and anesthetic complications.         This note was partially dictated using voice recognition software, so please excuse any errors that were not corrected.

## 2025-07-08 ENCOUNTER — TELEPHONE (OUTPATIENT)
Dept: NEUROSURGERY | Facility: CLINIC | Age: 50
End: 2025-07-08

## 2025-07-08 ENCOUNTER — PATIENT MESSAGE (OUTPATIENT)
Dept: NEUROSURGERY | Facility: CLINIC | Age: 50
End: 2025-07-08

## 2025-07-11 ENCOUNTER — TELEPHONE (OUTPATIENT)
Dept: NEUROSURGERY | Facility: CLINIC | Age: 50
End: 2025-07-11

## 2025-07-11 DIAGNOSIS — M48.02 STENOSIS OF CERVICAL SPINE WITH MYELOPATHY: Primary | ICD-10-CM

## 2025-07-11 DIAGNOSIS — M48.02 CERVICAL SPINAL STENOSIS: ICD-10-CM

## 2025-07-11 DIAGNOSIS — G99.2 STENOSIS OF CERVICAL SPINE WITH MYELOPATHY: Primary | ICD-10-CM

## 2025-07-11 NOTE — TELEPHONE ENCOUNTER
Zhang Kurtz will be having surgery on 07/28/25 with Dr. Garcia, Neurosurgeon, at [facility]. We are reaching out to obtain a surgical clearance from Ginny Palomino NP.  This is to ensure the safety of our patient. The surgery is C5-6, C6-7 ANTERIOR CERVICAL DISCECTOMY INSTRUMENTED FUSION and will be under general anesthesia. This procedure typically lasts approximately 3 hours. We request that the patient hold all anticoagulant/antiinflammatory medications for 5-7 days prior to surgery. Please let us know if our office can be of further assistance.

## 2025-07-14 ENCOUNTER — TELEPHONE (OUTPATIENT)
Dept: NEUROSURGERY | Facility: CLINIC | Age: 50
End: 2025-07-14
Payer: MEDICAID

## 2025-07-14 DIAGNOSIS — M48.02 CERVICAL STENOSIS OF SPINE: Primary | ICD-10-CM

## 2025-07-15 ENCOUNTER — PATIENT MESSAGE (OUTPATIENT)
Dept: NEUROSURGERY | Facility: CLINIC | Age: 50
End: 2025-07-15
Payer: MEDICAID

## 2025-07-17 DIAGNOSIS — F17.200 NEEDS SMOKING CESSATION EDUCATION: ICD-10-CM

## 2025-07-29 ENCOUNTER — TELEPHONE (OUTPATIENT)
Dept: FAMILY MEDICINE | Facility: CLINIC | Age: 50
End: 2025-07-29
Payer: MEDICAID

## 2025-07-29 PROBLEM — Z73.6 LIMITATION OF ACTIVITY DUE TO DISABILITY: Status: ACTIVE | Noted: 2025-07-29

## 2025-08-08 ENCOUNTER — PATIENT MESSAGE (OUTPATIENT)
Dept: NEUROSURGERY | Facility: CLINIC | Age: 50
End: 2025-08-08

## 2025-08-08 DIAGNOSIS — M48.02 CERVICAL SPINAL STENOSIS: ICD-10-CM

## 2025-08-08 RX ORDER — GABAPENTIN 300 MG/1
300 CAPSULE ORAL 3 TIMES DAILY
Qty: 90 CAPSULE | Refills: 0 | Status: SHIPPED | OUTPATIENT
Start: 2025-08-08

## 2025-08-08 RX ORDER — OXYCODONE AND ACETAMINOPHEN 10; 325 MG/1; MG/1
1 TABLET ORAL EVERY 4 HOURS PRN
Qty: 42 TABLET | Refills: 0 | Status: SHIPPED | OUTPATIENT
Start: 2025-08-08

## 2025-08-11 DIAGNOSIS — M48.02 CERVICAL SPINAL STENOSIS: Primary | ICD-10-CM

## 2025-08-11 RX ORDER — OXYCODONE AND ACETAMINOPHEN 10; 325 MG/1; MG/1
1 TABLET ORAL EVERY 6 HOURS PRN
Qty: 28 TABLET | Refills: 0 | Status: SHIPPED | OUTPATIENT
Start: 2025-08-11 | End: 2025-08-18

## 2025-08-25 DIAGNOSIS — M48.02 CERVICAL SPINAL STENOSIS: ICD-10-CM

## 2025-08-26 RX ORDER — OXYCODONE AND ACETAMINOPHEN 10; 325 MG/1; MG/1
1 TABLET ORAL EVERY 6 HOURS PRN
Qty: 28 TABLET | Refills: 0 | Status: SHIPPED | OUTPATIENT
Start: 2025-08-26 | End: 2025-09-02

## 2025-09-03 DIAGNOSIS — M48.02 CERVICAL SPINAL STENOSIS: ICD-10-CM

## 2025-09-03 DIAGNOSIS — G95.89 MYELOMALACIA OF CERVICAL CORD: Primary | ICD-10-CM

## 2025-09-03 RX ORDER — OXYCODONE AND ACETAMINOPHEN 10; 325 MG/1; MG/1
1 TABLET ORAL EVERY 6 HOURS PRN
Qty: 28 TABLET | Refills: 0 | Status: SHIPPED | OUTPATIENT
Start: 2025-09-03 | End: 2025-09-10